# Patient Record
Sex: FEMALE | Race: WHITE | Employment: OTHER | URBAN - NONMETROPOLITAN AREA
[De-identification: names, ages, dates, MRNs, and addresses within clinical notes are randomized per-mention and may not be internally consistent; named-entity substitution may affect disease eponyms.]

---

## 2021-01-05 RX ORDER — ACYCLOVIR 400 MG/1
TABLET ORAL
Qty: 60 TABLET | Refills: 1 | Status: SHIPPED | OUTPATIENT
Start: 2021-01-05 | End: 2021-01-13 | Stop reason: SDUPTHER

## 2021-01-13 DIAGNOSIS — A60.04 HERPESVIRAL VULVOVAGINITIS: Primary | ICD-10-CM

## 2021-01-13 RX ORDER — ACYCLOVIR 400 MG/1
TABLET ORAL
Qty: 60 TABLET | Refills: 5 | Status: SHIPPED | OUTPATIENT
Start: 2021-01-13 | End: 2021-07-22 | Stop reason: SDUPTHER

## 2021-03-24 ENCOUNTER — TELEPHONE (OUTPATIENT)
Dept: OBGYN CLINIC | Age: 71
End: 2021-03-24

## 2021-03-24 NOTE — TELEPHONE ENCOUNTER
Pt called back and spoke to Stella Velazquez- was told she should have refills at pharmacy. Pt will call them to get rx refilled.

## 2021-03-24 NOTE — TELEPHONE ENCOUNTER
Pt calling for refill of acyclovir for 90 day supply. Can we refill?  Needs sent to [facility] in [place]

## 2021-03-29 NOTE — TELEPHONE ENCOUNTER
Patient called back as the pharmacy does not have any refills. We called a Rx to [facility] in  in 1/2021 with 5 refills. Patient has the Rx transferred to [facility] in  and the Rx that was transferred was for one month with one refill. They never received any additional refills. Spoke to Michelle King at [facility] in  and verbally called in the remaining refills from patient's original Rx. Patient aware and verbalized understanding, no further questions/concerns voiced.

## 2021-07-22 ENCOUNTER — TELEPHONE (OUTPATIENT)
Dept: OBGYN CLINIC | Age: 71
End: 2021-07-22

## 2021-07-22 DIAGNOSIS — A60.04 HERPESVIRAL VULVOVAGINITIS: ICD-10-CM

## 2021-07-22 RX ORDER — ACYCLOVIR 400 MG/1
TABLET ORAL
Qty: 60 TABLET | Refills: 0 | Status: SHIPPED | OUTPATIENT
Start: 2021-07-22

## 2021-08-24 ENCOUNTER — TELEPHONE (OUTPATIENT)
Dept: OBGYN CLINIC | Age: 71
End: 2021-08-24

## 2021-08-24 NOTE — TELEPHONE ENCOUNTER
Patient called stating that she was scheduled for an appointment on 8/17, but had to cancel. Patient was just discharged from the ICU after being there for several days. She said that she was admitted for SE caused by her medications. She is still very sick and cannot get out of bed. ( Patient sounds congested during conversation and has several deep coughing spells). She has been having some yellow discharge for 2-3 weeks. When questioning about itching or odor, she cannot smell anything and does not have any itching. She is questioning if she could get something called in to Brenda in [facility] and get her visit rescheduled in the future when she is feeling better? Can you please review and give recommendations? I can get hospital records if needed.

## 2021-08-24 NOTE — TELEPHONE ENCOUNTER
Called PT 2x and each time was unable to leave a voice message. PT answering machine is going crazy.

## 2021-08-24 NOTE — TELEPHONE ENCOUNTER
Can you try again tomorrow? She is sick and said that she cannot even get out of bed right now. This can wait a week or two until she feels better per Jun Cue.

## 2021-08-26 NOTE — TELEPHONE ENCOUNTER
Called Pt and got her scheduled for 9/9 at 1:30 pm, they said it might not work they might should be here.

## 2022-06-03 ENCOUNTER — TELEPHONE (OUTPATIENT)
Dept: OBGYN CLINIC | Age: 72
End: 2022-06-03

## 2022-06-03 NOTE — TELEPHONE ENCOUNTER
I had a voicemail on my phone from patient on 6/2 when I was out of the office. She is requesting a refill of acyclovir. She states that she was just released from the hospital after 8 months from [facility]. She is still not able to walk and would like to delay a visit until she is a little stronger. Ok to send in refills and schedule patient for a few months out?

## 2022-06-07 NOTE — TELEPHONE ENCOUNTER
I spoke to patient and reviewed Cali's response. Patient is having dialysis 3 times a week and she has PT/OT that comes in on the opposite days. She does not know when she would be able to come in for an appointment. Encouraged patient to call Dr. Gardenia Saeed office to see if they would refill the medication without an appointment. If not, patient will call back for an appointment. Patient verbalized understanding, no further questions/concerns voiced.

## 2024-02-21 ENCOUNTER — HOSPITAL ENCOUNTER (INPATIENT)
Age: 74
LOS: 7 days | Discharge: NURSING, REHAB OR LONG-TERM CARE | DRG: 193 | End: 2024-02-28
Attending: FAMILY MEDICINE | Admitting: INTERNAL MEDICINE
Payer: MEDICARE

## 2024-02-21 PROBLEM — J18.9 COMMUNITY ACQUIRED PNEUMONIA OF LEFT LOWER LOBE OF LUNG: Status: ACTIVE | Noted: 2024-02-21

## 2024-02-21 PROBLEM — J18.9 PNEUMONIA DUE TO ORGANISM: Status: ACTIVE | Noted: 2024-02-21

## 2024-02-21 LAB
ALLEN TEST: POSITIVE
FIO2: 50
NEGATIVE BASE EXCESS, ART: 3.5 MMOL/L (ref 0–2)
O2 DELIVERY DEVICE: ABNORMAL
POC HCO3: 19.2 MMOL/L (ref 21–28)
POC O2 SATURATION: 89.7 % (ref 94–98)
POC PCO2: 27.8 MM HG (ref 35–48)
POC PH: 7.45 (ref 7.35–7.45)
POC PO2: 54.1 MM HG (ref 83–108)
SAMPLE SITE: ABNORMAL

## 2024-02-21 PROCEDURE — 94640 AIRWAY INHALATION TREATMENT: CPT

## 2024-02-21 PROCEDURE — 36600 WITHDRAWAL OF ARTERIAL BLOOD: CPT

## 2024-02-21 PROCEDURE — 2700000000 HC OXYGEN THERAPY PER DAY

## 2024-02-21 PROCEDURE — 6360000002 HC RX W HCPCS: Performed by: NURSE PRACTITIONER

## 2024-02-21 PROCEDURE — 1200000000 HC SEMI PRIVATE

## 2024-02-21 PROCEDURE — 6370000000 HC RX 637 (ALT 250 FOR IP): Performed by: NURSE PRACTITIONER

## 2024-02-21 PROCEDURE — 82803 BLOOD GASES ANY COMBINATION: CPT

## 2024-02-21 PROCEDURE — 99223 1ST HOSP IP/OBS HIGH 75: CPT | Performed by: NURSE PRACTITIONER

## 2024-02-21 PROCEDURE — 94761 N-INVAS EAR/PLS OXIMETRY MLT: CPT

## 2024-02-21 RX ORDER — LEVOFLOXACIN 750 MG/1
750 TABLET, FILM COATED ORAL EVERY 24 HOURS
Status: DISCONTINUED | OUTPATIENT
Start: 2024-02-21 | End: 2024-02-21

## 2024-02-21 RX ORDER — ONDANSETRON 4 MG/1
4 TABLET, ORALLY DISINTEGRATING ORAL EVERY 8 HOURS PRN
Status: DISCONTINUED | OUTPATIENT
Start: 2024-02-21 | End: 2024-02-28 | Stop reason: HOSPADM

## 2024-02-21 RX ORDER — ACETAMINOPHEN 325 MG/1
650 TABLET ORAL EVERY 6 HOURS PRN
Status: DISCONTINUED | OUTPATIENT
Start: 2024-02-21 | End: 2024-02-28 | Stop reason: HOSPADM

## 2024-02-21 RX ORDER — IPRATROPIUM BROMIDE AND ALBUTEROL SULFATE 2.5; .5 MG/3ML; MG/3ML
1 SOLUTION RESPIRATORY (INHALATION)
Status: DISCONTINUED | OUTPATIENT
Start: 2024-02-22 | End: 2024-02-21

## 2024-02-21 RX ORDER — SODIUM CHLORIDE 9 MG/ML
INJECTION, SOLUTION INTRAVENOUS PRN
Status: DISCONTINUED | OUTPATIENT
Start: 2024-02-21 | End: 2024-02-28 | Stop reason: HOSPADM

## 2024-02-21 RX ORDER — ONDANSETRON 2 MG/ML
4 INJECTION INTRAMUSCULAR; INTRAVENOUS EVERY 6 HOURS PRN
Status: DISCONTINUED | OUTPATIENT
Start: 2024-02-21 | End: 2024-02-28 | Stop reason: HOSPADM

## 2024-02-21 RX ORDER — SODIUM CHLORIDE 0.9 % (FLUSH) 0.9 %
10 SYRINGE (ML) INJECTION PRN
Status: DISCONTINUED | OUTPATIENT
Start: 2024-02-21 | End: 2024-02-28 | Stop reason: HOSPADM

## 2024-02-21 RX ORDER — ACETAMINOPHEN 650 MG/1
650 SUPPOSITORY RECTAL EVERY 6 HOURS PRN
Status: DISCONTINUED | OUTPATIENT
Start: 2024-02-21 | End: 2024-02-28 | Stop reason: HOSPADM

## 2024-02-21 RX ORDER — ALBUTEROL SULFATE 2.5 MG/3ML
2.5 SOLUTION RESPIRATORY (INHALATION) EVERY 4 HOURS PRN
Status: DISCONTINUED | OUTPATIENT
Start: 2024-02-21 | End: 2024-02-28 | Stop reason: HOSPADM

## 2024-02-21 RX ORDER — IPRATROPIUM BROMIDE AND ALBUTEROL SULFATE 2.5; .5 MG/3ML; MG/3ML
1 SOLUTION RESPIRATORY (INHALATION)
Status: DISCONTINUED | OUTPATIENT
Start: 2024-02-21 | End: 2024-02-22

## 2024-02-21 RX ORDER — HYDRALAZINE HYDROCHLORIDE 20 MG/ML
10 INJECTION INTRAMUSCULAR; INTRAVENOUS ONCE
Status: COMPLETED | OUTPATIENT
Start: 2024-02-21 | End: 2024-02-21

## 2024-02-21 RX ORDER — HYDRALAZINE HYDROCHLORIDE 20 MG/ML
10 INJECTION INTRAMUSCULAR; INTRAVENOUS ONCE
Status: COMPLETED | OUTPATIENT
Start: 2024-02-22 | End: 2024-02-22

## 2024-02-21 RX ORDER — SODIUM CHLORIDE 0.9 % (FLUSH) 0.9 %
5-40 SYRINGE (ML) INJECTION EVERY 12 HOURS SCHEDULED
Status: DISCONTINUED | OUTPATIENT
Start: 2024-02-21 | End: 2024-02-28 | Stop reason: HOSPADM

## 2024-02-21 RX ORDER — ALBUTEROL SULFATE 2.5 MG/3ML
2.5 SOLUTION RESPIRATORY (INHALATION)
Status: DISCONTINUED | OUTPATIENT
Start: 2024-02-21 | End: 2024-02-21

## 2024-02-21 RX ADMIN — HYDRALAZINE HYDROCHLORIDE 10 MG: 20 INJECTION, SOLUTION INTRAMUSCULAR; INTRAVENOUS at 22:26

## 2024-02-21 RX ADMIN — IPRATROPIUM BROMIDE AND ALBUTEROL SULFATE 1 DOSE: 2.5; .5 SOLUTION RESPIRATORY (INHALATION) at 22:31

## 2024-02-22 ENCOUNTER — APPOINTMENT (OUTPATIENT)
Dept: GENERAL RADIOLOGY | Age: 74
DRG: 193 | End: 2024-02-22
Attending: FAMILY MEDICINE
Payer: MEDICARE

## 2024-02-22 PROBLEM — E78.00 PURE HYPERCHOLESTEROLEMIA, UNSPECIFIED: Status: ACTIVE | Noted: 2024-02-22

## 2024-02-22 PROBLEM — N18.6 END STAGE RENAL FAILURE ON DIALYSIS (HCC): Status: ACTIVE | Noted: 2020-08-27

## 2024-02-22 PROBLEM — H53.8 BLURRED VISION: Status: ACTIVE | Noted: 2024-01-18

## 2024-02-22 PROBLEM — J44.1 ACUTE EXACERBATION OF CHRONIC OBSTRUCTIVE PULMONARY DISEASE (HCC): Status: ACTIVE | Noted: 2024-02-22

## 2024-02-22 PROBLEM — J10.1 INFLUENZA A: Status: ACTIVE | Noted: 2024-02-22

## 2024-02-22 PROBLEM — I21.4 NSTEMI (NON-ST ELEVATED MYOCARDIAL INFARCTION) (HCC): Status: ACTIVE | Noted: 2024-02-22

## 2024-02-22 PROBLEM — E11.51 PERIPHERAL VASCULAR DISORDER DUE TO DIABETES MELLITUS (HCC): Status: ACTIVE | Noted: 2021-06-14

## 2024-02-22 PROBLEM — G47.33 OBSTRUCTIVE SLEEP APNEA SYNDROME: Status: ACTIVE | Noted: 2024-02-22

## 2024-02-22 PROBLEM — R41.0 DISORIENTATION: Status: ACTIVE | Noted: 2024-02-22

## 2024-02-22 PROBLEM — Z99.2 END STAGE RENAL FAILURE ON DIALYSIS (HCC): Status: ACTIVE | Noted: 2020-08-27

## 2024-02-22 PROBLEM — E11.51 DIABETES MELLITUS WITH PERIPHERAL VASCULAR DISEASE (HCC): Status: ACTIVE | Noted: 2021-06-14

## 2024-02-22 LAB
ALBUMIN SERPL-MCNC: 3.7 G/DL (ref 3.5–5.2)
ALP SERPL-CCNC: 139 U/L (ref 35–104)
ALT SERPL-CCNC: 7 U/L (ref 5–33)
ANION GAP SERPL CALCULATED.3IONS-SCNC: 22 MMOL/L (ref 9–17)
AST SERPL-CCNC: 12 U/L
BASOPHILS # BLD: 0.07 K/UL (ref 0–0.2)
BASOPHILS NFR BLD: 1 %
BILIRUB DIRECT SERPL-MCNC: 0.1 MG/DL
BILIRUB INDIRECT SERPL-MCNC: 0.2 MG/DL (ref 0–1)
BILIRUB SERPL-MCNC: 0.3 MG/DL (ref 0.3–1.2)
BNP SERPL-MCNC: ABNORMAL PG/ML
BUN SERPL-MCNC: 42 MG/DL (ref 8–23)
BUN/CREAT SERPL: 6 (ref 9–20)
CALCIUM SERPL-MCNC: 8.8 MG/DL (ref 8.6–10.4)
CHLORIDE SERPL-SCNC: 97 MMOL/L (ref 98–107)
CO2 SERPL-SCNC: 16 MMOL/L (ref 20–31)
CREAT SERPL-MCNC: 7 MG/DL (ref 0.5–0.9)
EKG ATRIAL RATE: 326 BPM
EKG Q-T INTERVAL: 514 MS
EKG QRS DURATION: 188 MS
EKG QTC CALCULATION (BAZETT): 558 MS
EKG R AXIS: -84 DEGREES
EKG T AXIS: 98 DEGREES
EKG VENTRICULAR RATE: 71 BPM
EOSINOPHIL # BLD: 0 K/UL (ref 0–0.4)
EOSINOPHILS RELATIVE PERCENT: 0 % (ref 1–4)
ERYTHROCYTE [DISTWIDTH] IN BLOOD BY AUTOMATED COUNT: 15.9 % (ref 11.8–14.4)
EST. AVERAGE GLUCOSE BLD GHB EST-MCNC: 114 MG/DL
GFR SERPL CREATININE-BSD FRML MDRD: 6 ML/MIN/1.73M2
GLUCOSE BLD-MCNC: 167 MG/DL (ref 65–105)
GLUCOSE BLD-MCNC: 239 MG/DL (ref 65–105)
GLUCOSE BLD-MCNC: 239 MG/DL (ref 65–105)
GLUCOSE BLD-MCNC: 308 MG/DL (ref 65–105)
GLUCOSE SERPL-MCNC: 253 MG/DL (ref 70–99)
HBA1C MFR BLD: 5.6 % (ref 4–6)
HCO3 VENOUS: 21.1 MMOL/L (ref 22–29)
HCT VFR BLD AUTO: 40.5 % (ref 36.3–47.1)
HGB BLD-MCNC: 12.4 G/DL (ref 11.9–15.1)
IMM GRANULOCYTES # BLD AUTO: 0 K/UL (ref 0–0.3)
IMM GRANULOCYTES NFR BLD: 0 %
LYMPHOCYTES NFR BLD: 0.36 K/UL (ref 1–4.8)
LYMPHOCYTES RELATIVE PERCENT: 5 % (ref 24–44)
MCH RBC QN AUTO: 32.2 PG (ref 25.2–33.5)
MCHC RBC AUTO-ENTMCNC: 30.6 G/DL (ref 28.4–34.8)
MCV RBC AUTO: 105.2 FL (ref 82.6–102.9)
MONOCYTES NFR BLD: 0.64 K/UL (ref 0.2–0.8)
MONOCYTES NFR BLD: 9 % (ref 1–7)
NEGATIVE BASE EXCESS, VEN: 2.7 MMOL/L (ref 0–2)
NEUTROPHILS NFR BLD: 85 % (ref 36–66)
NEUTS SEG NFR BLD: 6.03 K/UL (ref 1.8–7.7)
NRBC BLD-RTO: 0 PER 100 WBC
O2 SAT, VEN: 96.4 % (ref 60–85)
PCO2, VEN: 33.1 MM HG (ref 41–51)
PH VENOUS: 7.41 (ref 7.32–7.43)
PLATELET # BLD AUTO: 144 K/UL (ref 138–453)
PMV BLD AUTO: 10.3 FL (ref 8.1–13.5)
PO2, VEN: 82.7 MM HG (ref 30–50)
POTASSIUM SERPL-SCNC: 5 MMOL/L (ref 3.7–5.3)
PROCALCITONIN SERPL-MCNC: 0.38 NG/ML (ref 0–0.09)
PROT SERPL-MCNC: 7.2 G/DL (ref 6.4–8.3)
RBC # BLD AUTO: 3.85 M/UL (ref 3.95–5.11)
SODIUM SERPL-SCNC: 135 MMOL/L (ref 135–144)
TROPONIN I SERPL HS-MCNC: 185 NG/L (ref 0–14)
TROPONIN I SERPL HS-MCNC: 214 NG/L (ref 0–14)
WBC OTHER # BLD: 7.1 K/UL (ref 3.5–11.3)

## 2024-02-22 PROCEDURE — 94761 N-INVAS EAR/PLS OXIMETRY MLT: CPT

## 2024-02-22 PROCEDURE — 85025 COMPLETE CBC W/AUTO DIFF WBC: CPT

## 2024-02-22 PROCEDURE — 80076 HEPATIC FUNCTION PANEL: CPT

## 2024-02-22 PROCEDURE — 99232 SBSQ HOSP IP/OBS MODERATE 35: CPT | Performed by: INTERNAL MEDICINE

## 2024-02-22 PROCEDURE — 36415 COLL VENOUS BLD VENIPUNCTURE: CPT

## 2024-02-22 PROCEDURE — 93005 ELECTROCARDIOGRAM TRACING: CPT | Performed by: INTERNAL MEDICINE

## 2024-02-22 PROCEDURE — 94640 AIRWAY INHALATION TREATMENT: CPT

## 2024-02-22 PROCEDURE — 6370000000 HC RX 637 (ALT 250 FOR IP): Performed by: NURSE PRACTITIONER

## 2024-02-22 PROCEDURE — 6360000002 HC RX W HCPCS: Performed by: NURSE PRACTITIONER

## 2024-02-22 PROCEDURE — 84145 PROCALCITONIN (PCT): CPT

## 2024-02-22 PROCEDURE — 71045 X-RAY EXAM CHEST 1 VIEW: CPT

## 2024-02-22 PROCEDURE — 82947 ASSAY GLUCOSE BLOOD QUANT: CPT

## 2024-02-22 PROCEDURE — 6370000000 HC RX 637 (ALT 250 FOR IP): Performed by: INTERNAL MEDICINE

## 2024-02-22 PROCEDURE — 2000000000 HC ICU R&B

## 2024-02-22 PROCEDURE — 2580000003 HC RX 258: Performed by: NURSE PRACTITIONER

## 2024-02-22 PROCEDURE — 82803 BLOOD GASES ANY COMBINATION: CPT

## 2024-02-22 PROCEDURE — 2700000000 HC OXYGEN THERAPY PER DAY

## 2024-02-22 PROCEDURE — 6360000002 HC RX W HCPCS: Performed by: INTERNAL MEDICINE

## 2024-02-22 PROCEDURE — 80048 BASIC METABOLIC PNL TOTAL CA: CPT

## 2024-02-22 PROCEDURE — 2580000003 HC RX 258: Performed by: INTERNAL MEDICINE

## 2024-02-22 PROCEDURE — 2500000003 HC RX 250 WO HCPCS: Performed by: INTERNAL MEDICINE

## 2024-02-22 PROCEDURE — 83880 ASSAY OF NATRIURETIC PEPTIDE: CPT

## 2024-02-22 PROCEDURE — 87040 BLOOD CULTURE FOR BACTERIA: CPT

## 2024-02-22 PROCEDURE — 87641 MR-STAPH DNA AMP PROBE: CPT

## 2024-02-22 PROCEDURE — 5A1D70Z PERFORMANCE OF URINARY FILTRATION, INTERMITTENT, LESS THAN 6 HOURS PER DAY: ICD-10-PCS | Performed by: INTERNAL MEDICINE

## 2024-02-22 PROCEDURE — 84484 ASSAY OF TROPONIN QUANT: CPT

## 2024-02-22 PROCEDURE — 90935 HEMODIALYSIS ONE EVALUATION: CPT

## 2024-02-22 PROCEDURE — 83036 HEMOGLOBIN GLYCOSYLATED A1C: CPT

## 2024-02-22 PROCEDURE — 74230 X-RAY XM SWLNG FUNCJ C+: CPT

## 2024-02-22 RX ORDER — PAROXETINE HYDROCHLORIDE 20 MG/1
20 TABLET, FILM COATED ORAL EVERY MORNING
COMMUNITY

## 2024-02-22 RX ORDER — HEPARIN SODIUM 5000 [USP'U]/ML
5000 INJECTION, SOLUTION INTRAVENOUS; SUBCUTANEOUS EVERY 8 HOURS SCHEDULED
Status: DISCONTINUED | OUTPATIENT
Start: 2024-02-22 | End: 2024-02-22

## 2024-02-22 RX ORDER — LATANOPROST 50 UG/ML
1 SOLUTION/ DROPS OPHTHALMIC NIGHTLY
COMMUNITY

## 2024-02-22 RX ORDER — LATANOPROST 50 UG/ML
1 SOLUTION/ DROPS OPHTHALMIC NIGHTLY
Status: DISCONTINUED | OUTPATIENT
Start: 2024-02-22 | End: 2024-02-28 | Stop reason: HOSPADM

## 2024-02-22 RX ORDER — HYDRALAZINE HYDROCHLORIDE 20 MG/ML
20 INJECTION INTRAMUSCULAR; INTRAVENOUS EVERY 6 HOURS PRN
Status: DISCONTINUED | OUTPATIENT
Start: 2024-02-22 | End: 2024-02-24

## 2024-02-22 RX ORDER — INSULIN GLARGINE 100 [IU]/ML
25 INJECTION, SOLUTION SUBCUTANEOUS NIGHTLY
Status: DISCONTINUED | OUTPATIENT
Start: 2024-02-22 | End: 2024-02-23

## 2024-02-22 RX ORDER — IPRATROPIUM BROMIDE AND ALBUTEROL SULFATE 2.5; .5 MG/3ML; MG/3ML
1 SOLUTION RESPIRATORY (INHALATION) 3 TIMES DAILY
Status: DISCONTINUED | OUTPATIENT
Start: 2024-02-22 | End: 2024-02-23

## 2024-02-22 RX ORDER — OSELTAMIVIR PHOSPHATE 30 MG/1
30 CAPSULE ORAL DAILY
Status: DISCONTINUED | OUTPATIENT
Start: 2024-02-22 | End: 2024-02-22

## 2024-02-22 RX ORDER — LABETALOL HYDROCHLORIDE 5 MG/ML
20 INJECTION, SOLUTION INTRAVENOUS ONCE
Status: COMPLETED | OUTPATIENT
Start: 2024-02-22 | End: 2024-02-22

## 2024-02-22 RX ORDER — ONDANSETRON 4 MG/1
4 TABLET, FILM COATED ORAL EVERY 6 HOURS PRN
COMMUNITY

## 2024-02-22 RX ORDER — OSELTAMIVIR PHOSPHATE 30 MG/1
30 CAPSULE ORAL ONCE
Status: DISCONTINUED | OUTPATIENT
Start: 2024-02-22 | End: 2024-02-23

## 2024-02-22 RX ORDER — LORAZEPAM 2 MG/ML
0.5 INJECTION INTRAMUSCULAR ONCE
Status: DISCONTINUED | OUTPATIENT
Start: 2024-02-22 | End: 2024-02-22

## 2024-02-22 RX ORDER — CARVEDILOL 12.5 MG/1
12.5 TABLET ORAL 2 TIMES DAILY WITH MEALS
Status: DISCONTINUED | OUTPATIENT
Start: 2024-02-22 | End: 2024-02-28 | Stop reason: HOSPADM

## 2024-02-22 RX ORDER — PREDNISOLONE ACETATE 10 MG/ML
1 SUSPENSION/ DROPS OPHTHALMIC 3 TIMES DAILY
Status: DISCONTINUED | OUTPATIENT
Start: 2024-02-22 | End: 2024-02-28 | Stop reason: HOSPADM

## 2024-02-22 RX ORDER — INSULIN LISPRO 100 [IU]/ML
INJECTION, SOLUTION INTRAVENOUS; SUBCUTANEOUS 3 TIMES DAILY
COMMUNITY

## 2024-02-22 RX ORDER — LACTULOSE 10 G/15ML
30 SOLUTION ORAL 2 TIMES DAILY
COMMUNITY

## 2024-02-22 RX ORDER — INSULIN LISPRO 100 [IU]/ML
0-4 INJECTION, SOLUTION INTRAVENOUS; SUBCUTANEOUS NIGHTLY
Status: DISCONTINUED | OUTPATIENT
Start: 2024-02-22 | End: 2024-02-24

## 2024-02-22 RX ORDER — BUPRENORPHINE AND NALOXONE 8; 2 MG/1; MG/1
0.25 FILM, SOLUBLE BUCCAL; SUBLINGUAL 3 TIMES DAILY
COMMUNITY

## 2024-02-22 RX ORDER — CARVEDILOL 12.5 MG/1
12.5 TABLET ORAL 2 TIMES DAILY WITH MEALS
COMMUNITY

## 2024-02-22 RX ORDER — IPRATROPIUM BROMIDE AND ALBUTEROL SULFATE 2.5; .5 MG/3ML; MG/3ML
1 SOLUTION RESPIRATORY (INHALATION) EVERY 4 HOURS
Status: DISCONTINUED | OUTPATIENT
Start: 2024-02-22 | End: 2024-02-22

## 2024-02-22 RX ORDER — LEVOTHYROXINE SODIUM 0.05 MG/1
50 TABLET ORAL DAILY
COMMUNITY

## 2024-02-22 RX ORDER — LEVOFLOXACIN 5 MG/ML
250 INJECTION, SOLUTION INTRAVENOUS EVERY 24 HOURS
Status: DISCONTINUED | OUTPATIENT
Start: 2024-02-22 | End: 2024-02-23

## 2024-02-22 RX ORDER — RISPERIDONE 0.5 MG/1
0.5 TABLET ORAL DAILY
COMMUNITY

## 2024-02-22 RX ORDER — LEFLUNOMIDE 20 MG/1
20 TABLET ORAL DAILY
COMMUNITY

## 2024-02-22 RX ORDER — OSELTAMIVIR PHOSPHATE 30 MG/1
30 CAPSULE ORAL
Status: DISCONTINUED | OUTPATIENT
Start: 2024-02-23 | End: 2024-02-23

## 2024-02-22 RX ORDER — PREDNISOLONE ACETATE 10 MG/ML
1 SUSPENSION/ DROPS OPHTHALMIC 3 TIMES DAILY
COMMUNITY

## 2024-02-22 RX ORDER — DEXTROSE MONOHYDRATE 100 MG/ML
INJECTION, SOLUTION INTRAVENOUS CONTINUOUS PRN
Status: DISCONTINUED | OUTPATIENT
Start: 2024-02-22 | End: 2024-02-28 | Stop reason: HOSPADM

## 2024-02-22 RX ORDER — LIDOCAINE HYDROCHLORIDE 20 MG/ML
JELLY TOPICAL ONCE
Status: COMPLETED | OUTPATIENT
Start: 2024-02-22 | End: 2024-02-22

## 2024-02-22 RX ORDER — OMEPRAZOLE 20 MG/1
20 CAPSULE, DELAYED RELEASE ORAL DAILY
COMMUNITY

## 2024-02-22 RX ORDER — DILTIAZEM HYDROCHLORIDE EXTENDED-RELEASE TABLETS 300 MG/1
TABLET, EXTENDED RELEASE ORAL
Status: ON HOLD | COMMUNITY
End: 2024-02-22

## 2024-02-22 RX ORDER — ALPRAZOLAM 1 MG/1
1 TABLET ORAL 3 TIMES DAILY PRN
COMMUNITY

## 2024-02-22 RX ORDER — INSULIN LISPRO 100 [IU]/ML
0-4 INJECTION, SOLUTION INTRAVENOUS; SUBCUTANEOUS
Status: DISCONTINUED | OUTPATIENT
Start: 2024-02-22 | End: 2024-02-24

## 2024-02-22 RX ORDER — ACYCLOVIR 200 MG/1
200 CAPSULE ORAL 2 TIMES DAILY
COMMUNITY

## 2024-02-22 RX ORDER — DILTIAZEM HYDROCHLORIDE 360 MG/1
360 CAPSULE, EXTENDED RELEASE ORAL DAILY
COMMUNITY

## 2024-02-22 RX ORDER — INSULIN GLARGINE 100 [IU]/ML
25 INJECTION, SOLUTION SUBCUTANEOUS NIGHTLY
COMMUNITY

## 2024-02-22 RX ORDER — GLUCAGON 1 MG/ML
1 KIT INJECTION PRN
Status: DISCONTINUED | OUTPATIENT
Start: 2024-02-22 | End: 2024-02-28 | Stop reason: HOSPADM

## 2024-02-22 RX ADMIN — LEVOFLOXACIN 250 MG: 250 INJECTION, SOLUTION INTRAVENOUS at 09:11

## 2024-02-22 RX ADMIN — LIDOCAINE HYDROCHLORIDE: 20 JELLY TOPICAL at 17:31

## 2024-02-22 RX ADMIN — PREDNISOLONE ACETATE 1 DROP: 10 SUSPENSION/ DROPS OPHTHALMIC at 21:55

## 2024-02-22 RX ADMIN — PREDNISOLONE ACETATE 1 DROP: 10 SUSPENSION/ DROPS OPHTHALMIC at 14:42

## 2024-02-22 RX ADMIN — HYDRALAZINE HYDROCHLORIDE 20 MG: 20 INJECTION INTRAMUSCULAR; INTRAVENOUS at 04:35

## 2024-02-22 RX ADMIN — WATER 40 MG: 1 INJECTION INTRAMUSCULAR; INTRAVENOUS; SUBCUTANEOUS at 21:55

## 2024-02-22 RX ADMIN — Medication 3.3 ML: at 13:48

## 2024-02-22 RX ADMIN — SODIUM CHLORIDE: 9 INJECTION, SOLUTION INTRAVENOUS at 08:59

## 2024-02-22 RX ADMIN — WATER 40 MG: 1 INJECTION INTRAMUSCULAR; INTRAVENOUS; SUBCUTANEOUS at 14:19

## 2024-02-22 RX ADMIN — INSULIN LISPRO 1 UNITS: 100 INJECTION, SOLUTION INTRAVENOUS; SUBCUTANEOUS at 17:28

## 2024-02-22 RX ADMIN — IPRATROPIUM BROMIDE AND ALBUTEROL SULFATE 1 DOSE: 2.5; .5 SOLUTION RESPIRATORY (INHALATION) at 08:48

## 2024-02-22 RX ADMIN — IPRATROPIUM BROMIDE AND ALBUTEROL SULFATE 1 DOSE: 2.5; .5 SOLUTION RESPIRATORY (INHALATION) at 15:07

## 2024-02-22 RX ADMIN — LABETALOL HYDROCHLORIDE 20 MG: 5 INJECTION, SOLUTION INTRAVENOUS at 08:59

## 2024-02-22 RX ADMIN — ONDANSETRON 4 MG: 2 INJECTION INTRAMUSCULAR; INTRAVENOUS at 18:16

## 2024-02-22 RX ADMIN — IPRATROPIUM BROMIDE AND ALBUTEROL SULFATE 1 DOSE: 2.5; .5 SOLUTION RESPIRATORY (INHALATION) at 11:36

## 2024-02-22 RX ADMIN — LATANOPROST 1 DROP: 50 SOLUTION OPHTHALMIC at 21:55

## 2024-02-22 RX ADMIN — HYDRALAZINE HYDROCHLORIDE 10 MG: 20 INJECTION, SOLUTION INTRAMUSCULAR; INTRAVENOUS at 00:04

## 2024-02-22 RX ADMIN — IPRATROPIUM BROMIDE AND ALBUTEROL SULFATE 1 DOSE: .5; 2.5 SOLUTION RESPIRATORY (INHALATION) at 19:29

## 2024-02-22 RX ADMIN — SODIUM CHLORIDE, PRESERVATIVE FREE 10 ML: 5 INJECTION INTRAVENOUS at 09:14

## 2024-02-22 RX ADMIN — SODIUM CHLORIDE, PRESERVATIVE FREE 10 ML: 5 INJECTION INTRAVENOUS at 21:55

## 2024-02-22 RX ADMIN — HYDRALAZINE HYDROCHLORIDE 20 MG: 20 INJECTION INTRAMUSCULAR; INTRAVENOUS at 22:17

## 2024-02-22 RX ADMIN — INSULIN LISPRO 3 UNITS: 100 INJECTION, SOLUTION INTRAVENOUS; SUBCUTANEOUS at 09:00

## 2024-02-22 RX ADMIN — HYDRALAZINE HYDROCHLORIDE 20 MG: 20 INJECTION INTRAMUSCULAR; INTRAVENOUS at 17:53

## 2024-02-22 RX ADMIN — SODIUM CHLORIDE, PRESERVATIVE FREE 10 ML: 5 INJECTION INTRAVENOUS at 00:18

## 2024-02-22 RX ADMIN — Medication 3.2 ML: at 13:48

## 2024-02-22 NOTE — RT PROTOCOL NOTE
RT Inhaler-Nebulizer Bronchodilator Protocol Note    There is a bronchodilator order in the chart from a provider indicating to follow the RT Bronchodilator Protocol and there is an “Initiate RT Inhaler-Nebulizer Bronchodilator Protocol” order as well (see protocol at bottom of note).    CXR Findings:  XR CHEST PORTABLE    Result Date: 2/22/2024  Cardiomegaly with diffuse bilateral interstitial and airspace opacities, which may represent pulmonary edema and/or pneumonia.  Possible left pleural effusion.       The findings from the last RT Protocol Assessment were as follows:   History Pulmonary Disease: Chronic pulmonary disease  Respiratory Pattern: Mild dyspnea at rest, irregular pattern, or RR 21-25 bpm  Breath Sounds: Severe inspiratory and expiratory wheezing or severely diminished  Cough: Strong, spontaneous, non-productive  Indication for Bronchodilator Therapy: Decreased or absent breath sounds, Wheezing associated with pulm disorder  Bronchodilator Assessment Score: 14    Aerosolized bronchodilator medication orders have been revised according to the RT Inhaler-Nebulizer Bronchodilator Protocol below.    Respiratory Therapist to perform RT Therapy Protocol Assessment initially then follow the protocol.  Repeat RT Therapy Protocol Assessment PRN for score 0-3 or on second treatment, BID, and PRN for scores above 3.    No Indications - adjust the frequency to every 6 hours PRN wheezing or bronchospasm, if no treatments needed after 48 hours then discontinue using Per Protocol order mode.     If indication present, adjust the RT bronchodilator orders based on the Bronchodilator Assessment Score as indicated below.  Use Inhaler orders unless patient has one or more of the following: on home nebulizer, not able to hold breath for 10 seconds, is not alert and oriented, cannot activate and use MDI correctly, or respiratory rate 25 breaths per minute or more, then use the equivalent nebulizer order(s) with same  Frequency and PRN reasons based on the score.  If a patient is on this medication at home then do not decrease Frequency below that used at home.    0-3 - enter or revise RT bronchodilator order(s) to equivalent RT Bronchodilator order with Frequency of every 4 hours PRN for wheezing or increased work of breathing using Per Protocol order mode.        4-6 - enter or revise RT Bronchodilator order(s) to two equivalent RT bronchodilator orders with one order with BID Frequency and one order with Frequency of every 4 hours PRN wheezing or increased work of breathing using Per Protocol order mode.        7-10 - enter or revise RT Bronchodilator order(s) to two equivalent RT bronchodilator orders with one order with TID Frequency and one order with Frequency of every 4 hours PRN wheezing or increased work of breathing using Per Protocol order mode.       11-13 - enter or revise RT Bronchodilator order(s) to one equivalent RT bronchodilator order with QID Frequency and an Albuterol order with Frequency of every 4 hours PRN wheezing or increased work of breathing using Per Protocol order mode.      Greater than 13 - enter or revise RT Bronchodilator order(s) to one equivalent RT bronchodilator order with every 4 hours Frequency and an Albuterol order with Frequency of every 2 hours PRN wheezing or increased work of breathing using Per Protocol order mode.     RT to enter RT Home Evaluation for COPD & MDI Assessment order using Per Protocol order mode.    Electronically signed by Nancy Garces RCP on 2/22/2024 at 9:08 AM

## 2024-02-22 NOTE — PROGRESS NOTES
Occupational Therapy  DATE: 2024    NAME: Allegra Meza  MRN: [id number]   : 1950    Patient not seen this date for Occupational Therapy due to:      [] Cancel by RN or physician due to:    [] Hemodialysis    [] Critical Lab Value Level     [] Blood transfusion in progress    [] Acute or unstable cardiovascular status   _MAP < 55 or more than >115  _HR < 40 or > 130    [] Acute or unstable pulmonary status   -FiO2 > 60%   _RR < 5 or >40    _O2 sats < 85%    [] Strict Bedrest    [] Off Unit for surgery or procedure    [] Off Unit for testing       [] Pending imaging to R/O fracture    [] Refusal by Patient      [] Intubated    [x] Other (RN okayed patient for OT. Therapist initiated OT eval (~20 minutes) and therapist unable to obtain social history due to patient alert and only oriented to self. No family in room. Unable to mobilize patient due to L anterior thigh dialysis cath and then dialysis entered room to start dialysis. OT continue to follow. Still needs OT eval.)                 [] OT being discontinued at this time. Patient independent. No further needs.     [] OT being discontinued at this time as the patient has been transferred to hospice care. No further needs.    Odalis Nazario, MERRY, OTR/L

## 2024-02-22 NOTE — PROGRESS NOTES
SonYony, updated via telephone of pending transfer to ICU room 1104. Please call Yony for additional updates/questions [phone].

## 2024-02-22 NOTE — PROCEDURES
INSTRUMENTAL SWALLOW REPORT  MODIFIED BARIUM SWALLOW    NAME: Allegra Meza   : 1950  MRN: [id number]       Date of Eval: 2024              Referring Diagnosis(es):      Past Medical History:  has a past medical history of Anxiety, Asthma, Atrial fibrillation (HCC), CAD (coronary artery disease), CHF (congestive heart failure) ([facility]), COPD (chronic obstructive pulmonary disease) ([facility]), DM (diabetes mellitus), type 2 (HCC), ESRD on dialysis (HCC), Fibromyalgia, Fibromyalgia, Hearing loss, Heart disease, Herpes simplex virus (HSV) infection, History of blood transfusion, Hypertension, Kidney failure, LVH (left ventricular hypertrophy), Mitral valve regurgitation, On home O2, Osteoporosis, PAD (peripheral artery disease) ([facility]), and Rheumatoid arthritis ([facility]).  Past Surgical History:  has a past surgical history that includes bladder repair;  section ();  section ();  section (); Hysterectomy; Lithotripsy; joint replacement; Tubal ligation; Tonsillectomy; Tympanoplasty; Coronary angioplasty with stent; pacemaker placement; and AV fistula repair.               Type of Study: Initial MBS       Recent CXR/CT of Chest: 24  IMPRESSION:  Cardiomegaly with diffuse bilateral interstitial and airspace opacities,  which may represent pulmonary edema and/or pneumonia.  Possible left pleural  effusion.       Patient Complaints/Reason for Referral:  Allegra Meza was referred for a MBS to assess the efficiency of his/her swallow function, assess for aspiration, and to make recommendations regarding safe dietary consistencies, effective compensatory strategies, and safe eating environment.       Onset of problem:    \"Allegra Meza is 73 y.o.,  female, previous medical history of chronic respiratory failure on oxygen 2 L 24 hours a secondary COPD, chronic renal failure hemodialysis, fibromyalgia, coronary disease, A-fib, MR, CHF, diabetes, hearing loss  flash penetration during the swallow, above the level of the vocal folds. No aspiration visualized, however incomplete laryngeal vestibule clearance and delayed cough without fleuro noted    Mildly/nectar thick straw: aspiration of a bolus column during the swallow, pt with no immediate cough response, does not respond to cues for a cough, eventual delay unproductive cough   Thin tsp: penetration of residue from the pyriforms during the swallow, above the level of the vocal folds. No aspiration visualized, however incomplete laryngeal vestibule clearance  Honey/moderately thick cup: penetration of residue from the pyriforms during the swallow, above the level of the vocal folds. No aspiration visualized, however incomplete laryngeal vestibule clearance. Pt with delayed cough after the test was completed      Pt's penetration, aspiration, an moderate levels of residue related to delayed and reduced hyolaryngeal excursion, whitley of tongue retraction, epiglottic inversion, cricopharyngeal opening, pharyngeal contraction,  and laryngeal vestibule closure.      Given high levels of poorly managed residue across consistencies and silent aspiration observed during this evaluation ST recommends pt be NPO with nutrition and hydration via alternate means. ST recommends repeat modified barium swallow study in 4-6 days.         Consistencies Administered: Soft & Bite Sized;Pureed;Mildly Thick straw;Mildly Thick cup;Moderately Thick cup;Thin teaspoon          Dysphagia Outcome Severity Scale: Level 1: Severe dysphagia- NPO. Unable to tolerate any PO safely  Penetration-Aspiration Scale (PAS): 8 - Material Enters the airway, passes below the vocal folds, and no effort is made to eject    Recommended Diet:  Solid consistency: NPO  Liquid consistency: NPO       Medication administration: Via NG/PEG          Recommendations/Treatment     Recommendations: F/U MBS     D/C Recommendations: Ongoing speech therapy is recommended during

## 2024-02-22 NOTE — PROGRESS NOTES
lower lobe pneumonia and she was given 750 mg Levaquin. She is a dialysis patient, and decision was made to transfer patient to [facility] for further management of LLL pneumonia. She tested positive for influenza A. Upon arrival to the PCU, patient was found to be sl hypoxic at 88%, tachypneic with increased work of breathing. ABGs showed pCO2 27.8, pO2 54.1, pHCO3 19.2, pH 7.4. She was placed on HFNC and SpO2 continued to stay in the 80s with no change in work of breathing. Another CXR was taken and showed cardiomegaly with diffuse bilateral interstitial and airspace opacities, suggestive of pulmonary edema and or pneumonia. She has a PMH of COPD and wears O2 at home, atrial fib on Eliquis, anxiety, GERD, CHF, and HTN. Pulmonary consult was placed. BP was noted to be very high with SBP in the 190s- patient missed HD treatment today due to illness. \"    Review of Systems:     unobtainable      Medications:     Allergies:    Allergies   Allergen Reactions    Amlodipine Swelling    Betamethasone Other (See Comments)     States \"passed out\"    Diclofenac-Misoprostol Hives    Penicillins Rash and Hives    Phenylephrine-Guaifenesin Hives    Propranolol Hives    Quetiapine Anxiety     States caused anxiety attacks    Adhesive Tape     Clonidine Derivatives     Cymbalta [Duloxetine Hcl]     Entex T [Pseudoephedrine-Guaifenesin]     Inderal La [Propranolol Hcl]     Mometasone Other (See Comments)    Codeine Anxiety, Headaches and Other (See Comments)     States makes her jittery    Morphine Headaches and Nausea Only     \"makes me sick to my stomach\"    Sulfamethoxazole-Trimethoprim Other (See Comments)     UNKNOWN    Tapentadol Anxiety       Current Meds:   Scheduled Meds:    levofloxacin  250 mg IntraVENous Q24H    oseltamivir  30 mg Oral Daily    apixaban  5 mg Oral BID    carvedilol  12.5 mg Oral BID WC    insulin lispro  0-4 Units SubCUTAneous TID WC    insulin lispro  0-4 Units SubCUTAneous Nightly    ipratropium 0.5       MPV 10.3     Chemistry:  Recent Labs     02/22/24  0118      K 5.0   CL 97*   CO2 16*   GLUCOSE 253*   BUN 42*   CREATININE 7.0*   ANIONGAP 22*   LABGLOM 6*   CALCIUM 8.8     Recent Labs     02/22/24  0625   POCGLU 308*     ABG:  Lab Results   Component Value Date/Time    POCPH 7.447 02/21/2024 11:41 PM    POCPCO2 27.8 02/21/2024 11:41 PM    POCPO2 54.1 02/21/2024 11:41 PM    POCHCO3 19.2 02/21/2024 11:41 PM    NBEA 3.5 02/21/2024 11:41 PM    ONRL9WEP 89.7 02/21/2024 11:41 PM    FIO2 50.0 02/21/2024 11:41 PM     No results found for: \"SPECIAL\"  Lab Results   Component Value Date/Time    CULTURE NO GROWTH <24 HRS 02/22/2024 01:17 AM    CULTURE NO GROWTH <24 HRS 02/22/2024 01:17 AM       Radiology:  XR CHEST PORTABLE    Result Date: 2/22/2024  Cardiomegaly with diffuse bilateral interstitial and airspace opacities, which may represent pulmonary edema and/or pneumonia.  Possible left pleural effusion.       Physical Examination:        General appearance:  lethargic, awakens, cooperative and no distress  Mental Status:  oriented to person,  normal affect  Lungs:  crackles bilaterally, normal effort  Heart:  regular rate and rhythm, no murmur  Abdomen:  soft, nontender, nondistended, normal bowel sounds, no masses, hepatomegaly, splenomegaly  Extremities:  no edema, redness, tenderness in the calves  Skin:  no gross lesions, rashes, induration    Assessment:        Hospital Problems             Last Modified POA    * (Principal) Community acquired pneumonia of left lower lobe of lung 2/21/2024 Yes    Presence of cardiac pacemaker 2/22/2024 Yes    Hypertension 2/22/2024 Yes    End stage renal failure on dialysis (HCC) 2/22/2024 Yes    Diabetes mellitus with peripheral vascular disease (HCC) 2/22/2024 Yes    Overview Signed 2/22/2024  1:19 AM by Tracy Guerra APRN - NP     Formatting of this note might be different from the original.   Added automatically from request for surgery [id number]

## 2024-02-22 NOTE — H&P
[facility]  Office: [phone]  Blue Warner DO, Jalil Medina DO, Maurice Webb DO, Vaibhav Hilliard DO, Preston Christensen MD, Kimmy Mckinney MD, Rosita Eaton MD, Madina Pan MD,  Rohan Espinoza MD, Geraldine Hopper MD, Talia Hernandez MD,  Rom Musa DO, Gil Treadwell MD, Chandu Serna MD, Davey Warner DO, Liz Mathew MD,  Michael Celaya DO, Milly Schwarz MD, Jaclyn Cummings MD, Swapna Ferguson MD, Amado Dove MD,  Marvin Kaufman MD, Claribel Ochoa MD, Bella Cox MD, Alvino Blas MD, Adolfo Donnelly MD, Cassie Alvarado MD, Ezio Barrios DO, Gilberto Mendieta DO, Yeimy Martin MD,  Jesu Damon MD, Shirley Waterhouse, CNP,  Елена Inman CNP, Devin Sepulveda, CNP,  Karyn Urena, SHAWANDA, Tracy Guerra, CNP, Marilin Wiggins, CNP, Tasha Gonzales CNP, Donna Macario CNP, Lauren Vail, CNP, Danette Guadarrama, PA-C, Maya Bond PA-C, Violette Doty, CNP, Margarita Valdivia, CNP, Aurora Benites, CNS, Lelia Wilson, CNP, Candy Williamson CNP, Tracy Schwab, CNP         [facility]   IN-PATIENT SERVICE   [facility]    HISTORY AND PHYSICAL EXAMINATION            Date:   2/22/2024  Patient name:  Allegra Meza  Date of admission:  2/21/2024  9:30 PM  MRN:   [id number]  Account:  [id number]  YOB: 1950  PCP:    Solomon Woodard MD  Room:   [address] [place] [address]  Code Status:    Full Code    Chief Complaint:     Shortness of breath    History Obtained From:     EMR- patient has AMS     History of Present Illness:     Allegra Meza is a 73 y.o. Non- / non  female who presents with No chief complaint on file.   and is admitted to the hospital for the management of Community acquired pneumonia of left lower lobe of lung.    Patient is very [place] and oriented to self only. She was transferred from [facility] after being evaluated for fever, cough, and sore throat. CXR was completed and showed atelectasis vs left lower lobe pneumonia and she was  as ordered.   DM: Monitor BG AC & HS. SSI. Check A1c.   CHF: Cardiac meds as ordered. Monitor intake and output. 1800 mL FR daily. Daily weights.   Exacerbation COPD: See #1  Influenza A: Renal dose Tamiflu x 5 days  Cont Eliquis  AMS: Neurovasc check q 4 hours. Suspect 2/2 to fever and illness/ hypoxia. Keep SpO2 > 90%.     Consultations:   IP CONSULT TO NEPHROLOGY  IP CONSULT TO PULMONOLOGY     Patient is admitted as inpatient status because of co-morbidities listed above, severity of signs and symptoms as outlined, requirement for current medical therapies and most importantly because of direct risk to patient if care not provided in a hospital setting.  Expected length of stay > 48 hours.    ALTAF Henriquez NP  2/22/2024  1:28 AM    Copy sent to Solomon Maya MD

## 2024-02-22 NOTE — PROGRESS NOTES
Transitions of Care Pharmacy Service   Medication Review    The patient's list of current home medications has been reviewed.     Source(s) of information: surescripts, patient's     Based on information provided by the above source(s), I have updated the patient's home med list as described below.     Please review the ACTION REQUESTED section of this note for any discrepancies on current hospital orders.      I changed or updated the following medications on the patient's home medication list:  Discontinued N/A     Added Insulin glargine and lispro, leflunomide, levothyroxine, latanoprost, omeprazole, zofran, paxil, risperidone, prednisolone opth, vitamin d3     Adjusted   Suboxone, acyclovir, diltiazem     Other Notes N/A          PROVIDER ACTION REQUESTED  Medications that need to be addressed by a physician/nurse practitioner:    Medication Action Requested   Insulin glargine and lispro, leflunomide, levothyroxine, latanoprost, omeprazole, zofran, paxil, risperidone, prednisolone opth, vitamin d3     Please consider restarting home medications as appropriate.         Please feel free to call me with any questions about this encounter. Thank you.    This note will be reviewed and co-signed by the Transitions of Care Pharmacist.    Go SommersD student  Transitions of Care Pharmacy Service  Phone:  [phone]  Fax: [phone]      Electronically signed by Austin Monteiro on 2/22/2024 at 11:09 AM       Prior to Admission medications    Medication Sig Start Date End Date Taking? Authorizing Provider   carvedilol (COREG) 12.5 MG tablet Take 1 tablet by mouth 2 times daily (with meals)   Yes Nereida Aguilar MD   ALPRAZolam (XANAX) 1 MG tablet Take 1 tablet by mouth 3 times daily as needed for Anxiety. Max Daily Amount: 3 mg   Yes Nereida Aguilar MD   apixaban (ELIQUIS) 5 MG TABS tablet Take 1 tablet by mouth 2 times daily   Yes Nereida Aguilar MD   buprenorphine-naloxone (SUBOXONE)

## 2024-02-22 NOTE — PROGRESS NOTES
Physical Therapy  DATE: 2024    NAME: Allegra Meza  MRN: [id number]   : 1950    Patient not seen this date for Physical Therapy due to:      [] Cancel by RN or physician due to:    [] Hemodialysis    [] Critical Lab Value Level     [] Blood transfusion in progress    [] Acute or unstable cardiovascular status   _MAP < 55 or more than >115  _HR < 40 or > 130    [] Acute or unstable pulmonary status   -FiO2 > 60%   _RR < 5 or >40    _O2 sats < 85%    [] Strict Bedrest    [] Off Unit for surgery or procedure    [] Off Unit for testing       [] Pending imaging to R/O fracture    [] Refusal by Patient      [x] Other RN okayed patient for PT. Therapist initiated PT eval (~20 minutes) and therapist unable to obtain social history due to patient alert and only oriented to self. No family in room.  Unable to mobilize patient due to L anterior thigh dialysis cath and then dialysis entered room to start dialysis.  PT continue to follow.  Still needs PT eval.      [] PT being discontinued at this time. Patient independent. No further needs.     [] PT being discontinued at this time as the patient has been transferred to hospice care. No further needs.      Beena Gaona, PT

## 2024-02-22 NOTE — PLAN OF CARE
Problem: Respiratory - Adult  Goal: Achieves optimal ventilation and oxygenation  2/22/2024 0816 by Nancy Garces RCP  Outcome: Progressing  Flowsheets (Taken 2/22/2024 0200 by Stephanie Finney RN)  Achieves optimal ventilation and oxygenation:   Assess for changes in respiratory status   Assess for changes in mentation and behavior   Position to facilitate oxygenation and minimize respiratory effort   Oxygen supplementation based on oxygen saturation or arterial blood gases   Respiratory therapy support as indicated     Problem: Respiratory - Adult  Goal: Able to breathe comfortably  2/22/2024 0816 by Nancy Garces RCP  Outcome: Progressing

## 2024-02-22 NOTE — CONSULTS
Reason for Consult:  End stage renal disease.    Requesting Physician:  Vaibhav Hilliard DO    HISTORY OF PRESENT ILLNESS:    The patient is a 73 y.o. female who [place] [facility] [place] under the care of Dr Louise admitted with Influenza A and left lung pneumonia and CHF. Today herhis potassium level is 5.0.    Review Of Systems:   Unable to obtain because of clinical condition.     Past Medical History:   Diagnosis Date    Anxiety     Asthma     Atrial fibrillation (HCC)     CAD (coronary artery disease)     CHF (congestive heart failure) (HCC)     COPD (chronic obstructive pulmonary disease) (HCC)     DM (diabetes mellitus), type 2 (HCC)     ESRD on dialysis (HCC)     Fibromyalgia     Fibromyalgia     Hearing loss     Heart disease     Herpes simplex virus (HSV) infection     History of blood transfusion     Hypertension     Kidney failure     LVH (left ventricular hypertrophy)     Mitral valve regurgitation     On home O2     Osteoporosis     PAD (peripheral artery disease) ([facility])     Rheumatoid arthritis (HCC)        Past Surgical History:   Procedure Laterality Date    AV FISTULA REPAIR      x 2    BLADDER REPAIR       SECTION       SECTION       SECTION      CORONARY ANGIOPLASTY WITH STENT PLACEMENT      HYSTERECTOMY (CERVIX STATUS UNKNOWN)      w/o bso    JOINT REPLACEMENT      LITHOTRIPSY      PACEMAKER PLACEMENT      TONSILLECTOMY      TUBAL LIGATION      TYMPANOPLASTY         Prior to Admission medications    Medication Sig Start Date End Date Taking? Authorizing Provider   carvedilol (COREG) 12.5 MG tablet Take 1 tablet by mouth 2 times daily (with meals)   Yes Nereida Aguilar MD   ALPRAZolam (XANAX) 1 MG tablet Take 1 tablet by mouth 3 times daily as needed for Anxiety. Max Daily Amount: 3 mg   Yes ProviderNereida MD   dilTIAZem HCl  MG TB24 Take by mouth   Yes ProviderNereida MD   apixaban (ELIQUIS) 5

## 2024-02-22 NOTE — PROGRESS NOTES
End Of Shift Note  [facility] ICU  Summary of shift: Pt brought over to ICU because of possible need for precedex to help tolerate bipap. Precedex never ordered or needed. Bipap not needed. Pt sating well on heated high flow. Plan is for dialysis today. Hydralazine given for high BP. Pt only oriented to self and keeps saying she wants to go home. Reorienting pt and reassurance helps.    Vitals:    Vitals:    02/22/24 0400 02/22/24 0445 02/22/24 0500 02/22/24 0600   BP: (!) 191/75  (!) 171/58 (!) 170/63   Pulse: 71 70 70 70   Resp: 24 17 (!) 31 27   Temp: 99.7 °F (37.6 °C)      TempSrc: Oral      SpO2: 94% 97% 95% 96%   Weight:       Height:            I&O: No intake or output data in the 24 hours ending 02/22/24 0743    Resp Status: Heated High flow 40% 50L    Ventilator Settings:     / / /FiO2 : (S) 40 %    Critical Care IV infusions:   dextrose      sodium chloride          LDA:   Peripheral IV 02/21/24 Distal;Left;Anterior Forearm (Active)   Number of days: 1       Tunneled Hemodialysis Catheter Left Thigh (Active)   Number of days:

## 2024-02-22 NOTE — PROGRESS NOTES
End Of Shift Note  [facility]  Summary of shift: Patient remains only oriented to self, has become less lethargic, slow to respond and very hard of hearing.  Per  and son this is her baseline. Anxiety improved. CT head at [facility] was negative on 2/21, no repeat at this time.  Family requested to meet with palliative to discuss goals of cares, palliative consult placed. Received Dialysis 3.4L removed, tolerated well. Blood pressure improved, prn medication given and one time dose labetalol given. Failed video swallow. Remains NPO, no oral medications given. NG ordered, two unsuccessful attempts, both nares attempted, attending notified. Dietary consult placed, pending successful NG placement. Continues on levaquin and solumedrol started. MRSA swab pending. Troponin elevated, trending x2 q6h. Med rec being completed by pharmacy. Small smear BM. Oxygen requirements weaned to room air, lungs remain wheezing, crackles, denies shortness of breath.       Vitals:    Vitals:    02/22/24 1600 02/22/24 1734 02/22/24 1746 02/22/24 1800   BP: (!) 164/94 (!) 186/75 (!) 180/72 (!) 182/70   Pulse: 70 70 72 70   Resp: 20 17 28 13   Temp: 98 °F (36.7 °C)      TempSrc: Temporal      SpO2: 97% (S) 95%  96%   Weight:       Height:            I&O:   Intake/Output Summary (Last 24 hours) at 2/22/2024 1822  Last data filed at 2/22/2024 1637  Gross per 24 hour   Intake 670.87 ml   Output 4000 ml   Net -3329.13 ml       Resp Status: O2 weaned to room air, lung sounds wheezing, crackles     Ventilator Settings:     / / /FiO2 : 40 %    Critical Care IV infusions:   dextrose      sodium chloride Stopped (02/22/24 1414)        LDA:   Peripheral IV 02/21/24 Distal;Left;Anterior Forearm (Active)   Number of days: 1       Tunneled Hemodialysis Catheter Left Thigh (Active)   Number of days:

## 2024-02-22 NOTE — PLAN OF CARE
Problem: Respiratory - Adult  Goal: Achieves optimal ventilation and oxygenation  Outcome: Progressing     Problem: Respiratory - Adult  Goal: Able to breathe comfortably  Outcome: Progressing

## 2024-02-22 NOTE — PROGRESS NOTES
Patient transferred to ICU for closer monitoring as patient refusing bipap at this time. Spoke with patient's son Yony who would clarified life-saving measures may include, CPR, defib, meds, but no intubation.     Yony updated on patient transfer to ICU- no further questions at this time.

## 2024-02-22 NOTE — RT PROTOCOL NOTE
RT Inhaler-Nebulizer Bronchodilator Protocol Note    There is a bronchodilator order in the chart from a provider indicating to follow the RT Bronchodilator Protocol and there is an “Initiate RT Inhaler-Nebulizer Bronchodilator Protocol” order as well (see protocol at bottom of note).    CXR Findings:  XR CHEST PORTABLE    Result Date: 2/22/2024  1. Persistent opacification of the left lung base in keeping with underlying effusion, atelectasis or pneumonia. 2. Improvement in the aeration of the right lung.     XR CHEST PORTABLE    Result Date: 2/22/2024  Cardiomegaly with diffuse bilateral interstitial and airspace opacities, which may represent pulmonary edema and/or pneumonia.  Possible left pleural effusion.       The findings from the last RT Protocol Assessment were as follows:   History Pulmonary Disease: Chronic pulmonary disease  Respiratory Pattern: Dyspnea on exertion or RR 21-25 bpm  Breath Sounds: Intermittent or unilateral wheezes  Cough: Strong, spontaneous, non-productive  Indication for Bronchodilator Therapy: Decreased or absent breath sounds  Bronchodilator Assessment Score: 8    Aerosolized bronchodilator medication orders have been revised according to the RT Inhaler-Nebulizer Bronchodilator Protocol below.    Respiratory Therapist to perform RT Therapy Protocol Assessment initially then follow the protocol.  Repeat RT Therapy Protocol Assessment PRN for score 0-3 or on second treatment, BID, and PRN for scores above 3.    No Indications - adjust the frequency to every 6 hours PRN wheezing or bronchospasm, if no treatments needed after 48 hours then discontinue using Per Protocol order mode.     If indication present, adjust the RT bronchodilator orders based on the Bronchodilator Assessment Score as indicated below.  Use Inhaler orders unless patient has one or more of the following: on home nebulizer, not able to hold breath for 10 seconds, is not alert and oriented, cannot activate and use MDI  correctly, or respiratory rate 25 breaths per minute or more, then use the equivalent nebulizer order(s) with same Frequency and PRN reasons based on the score.  If a patient is on this medication at home then do not decrease Frequency below that used at home.    0-3 - enter or revise RT bronchodilator order(s) to equivalent RT Bronchodilator order with Frequency of every 4 hours PRN for wheezing or increased work of breathing using Per Protocol order mode.        4-6 - enter or revise RT Bronchodilator order(s) to two equivalent RT bronchodilator orders with one order with BID Frequency and one order with Frequency of every 4 hours PRN wheezing or increased work of breathing using Per Protocol order mode.        7-10 - enter or revise RT Bronchodilator order(s) to two equivalent RT bronchodilator orders with one order with TID Frequency and one order with Frequency of every 4 hours PRN wheezing or increased work of breathing using Per Protocol order mode.       11-13 - enter or revise RT Bronchodilator order(s) to one equivalent RT bronchodilator order with QID Frequency and an Albuterol order with Frequency of every 4 hours PRN wheezing or increased work of breathing using Per Protocol order mode.      Greater than 13 - enter or revise RT Bronchodilator order(s) to one equivalent RT bronchodilator order with every 4 hours Frequency and an Albuterol order with Frequency of every 2 hours PRN wheezing or increased work of breathing using Per Protocol order mode.     RT to enter RT Home Evaluation for COPD & MDI Assessment order using Per Protocol order mode.    Electronically signed by Nancy Garces RCP on 2/22/2024 at 6:06 PM

## 2024-02-22 NOTE — PROGRESS NOTES
HEMODIALYSIS PRE-TREATMENT NOTE    Patient Identifiers prior to treatment: yes    Isolation Required: yes        Isolation Type:air    Pt. Does not have covid            Hepatitis status:                           Date Drawn                             Result  Hepatitis B Surface Antigen 2/05/2024 neg        Hepatitis B Surface Antibody 1/2/2023 neg        Hepatitis B Core Antibody 4/07/2022 neg          How was Hepatitis Status verified: yes      Was a copy of the labs you documented provided to facility for the patient's chart: yes    Hemodialysis orders verified: yes    Access Within normal limits ( I.e. s/s of infection,...): yes    Pre-Assessment completed: yes    Pre-dialysis report received from: Gurvinder                      Time: 1019

## 2024-02-22 NOTE — CONSULTS
Pulmonary Medicine and Critical Care Consult    Patient - Allegra Meza   MRN -  [id number]   [id number]   - 1950      Date of Admission -  2024  9:30 PM  Date of evaluation -  2024  Room - [address] [place] [address]   Hospital Day - 1  Consulting - Vaibhav Hilliard DO Primary Care Physician - Solomon oWodard MD     Reason for Consult      ICU management/respiratory failure  Assessment/recommendations   Chronic hypoxic respiratory failure  Oxygen by nasal cannula high flow as needed to saturation above 90%  BiPAP support as needed  Incentive spirometry every hour awake    Pulmonary edema/possible aspiration  Hemodialysis fluid removal per nephrology  Speech swallow evaluation    Influenza A/COPD exacerbation  DuoNeb by nebulizer  Droplet isolation  Tamiflu; need to confirm able to take oral otherwise need NG insertion  Solu-Medrol 40 IV every 8 hours/monitor blood sugar  Levaquin empirically/check EKG make sure QTc not above 450 hold in that case  MRSA swab  Follow-up chest x-ray    Change mental status   CT brain no contrast/liver function test ammonia    Hypertension-diabetes  BP controlled/monitor blood sugar    A-fib/CHF/CAD/PAD   Check troponin BNP  On Eliquis    rheumatoid arthritis  Rheumatology follow-up    Precautions prophylaxis  DVT prophylaxis on Eliquis  Problem List      Patient Active Problem List   Diagnosis    Pneumonia due to organism    Community acquired pneumonia of left lower lobe of lung    Presence of cardiac pacemaker    Rheumatoid arthritis (HCC)    Peripheral vascular disorder due to diabetes mellitus (HCC)    Obstructive sleep apnea syndrome    Hypertension    End stage renal failure on dialysis (HCC)    Diabetes mellitus with peripheral vascular disease (HCC)    Congestive heart failure (HCC)    Arteriosclerosis of coronary artery    Acute exacerbation of chronic obstructive pulmonary disease ([facility])    NSTEMI (non-ST elevated myocardial infarction) ([facility])    Pure

## 2024-02-22 NOTE — RT PROTOCOL NOTE
RT Inhaler-Nebulizer Bronchodilator Protocol Note    There is a bronchodilator order in the chart from a provider indicating to follow the RT Bronchodilator Protocol and there is an “Initiate RT Inhaler-Nebulizer Bronchodilator Protocol” order as well (see protocol at bottom of note).    CXR Findings:  No results found.    The findings from the last RT Protocol Assessment were as follows:   History Pulmonary Disease: Chronic pulmonary disease  Respiratory Pattern: Dyspnea on exertion or RR 21-25 bpm  Breath Sounds: Inspiratory and expiratory or bilateral wheezing and/or rhonchi  Cough: Strong, spontaneous, non-productive  Indication for Bronchodilator Therapy:    Bronchodilator Assessment Score: 10    Aerosolized bronchodilator medication orders have been revised according to the RT Inhaler-Nebulizer Bronchodilator Protocol below.    Respiratory Therapist to perform RT Therapy Protocol Assessment initially then follow the protocol.  Repeat RT Therapy Protocol Assessment PRN for score 0-3 or on second treatment, BID, and PRN for scores above 3.    No Indications - adjust the frequency to every 6 hours PRN wheezing or bronchospasm, if no treatments needed after 48 hours then discontinue using Per Protocol order mode.     If indication present, adjust the RT bronchodilator orders based on the Bronchodilator Assessment Score as indicated below.  Use Inhaler orders unless patient has one or more of the following: on home nebulizer, not able to hold breath for 10 seconds, is not alert and oriented, cannot activate and use MDI correctly, or respiratory rate 25 breaths per minute or more, then use the equivalent nebulizer order(s) with same Frequency and PRN reasons based on the score.  If a patient is on this medication at home then do not decrease Frequency below that used at home.    0-3 - enter or revise RT bronchodilator order(s) to equivalent RT Bronchodilator order with Frequency of every 4 hours PRN for wheezing or  increased work of breathing using Per Protocol order mode.        4-6 - enter or revise RT Bronchodilator order(s) to two equivalent RT bronchodilator orders with one order with BID Frequency and one order with Frequency of every 4 hours PRN wheezing or increased work of breathing using Per Protocol order mode.        7-10 - enter or revise RT Bronchodilator order(s) to two equivalent RT bronchodilator orders with one order with TID Frequency and one order with Frequency of every 4 hours PRN wheezing or increased work of breathing using Per Protocol order mode.       11-13 - enter or revise RT Bronchodilator order(s) to one equivalent RT bronchodilator order with QID Frequency and an Albuterol order with Frequency of every 4 hours PRN wheezing or increased work of breathing using Per Protocol order mode.      Greater than 13 - enter or revise RT Bronchodilator order(s) to one equivalent RT bronchodilator order with every 4 hours Frequency and an Albuterol order with Frequency of every 2 hours PRN wheezing or increased work of breathing using Per Protocol order mode.     RT to enter RT Home Evaluation for COPD & MDI Assessment order using Per Protocol order mode.    Electronically signed by Beena Ding RCP on 2/21/2024 at 10:06 PM

## 2024-02-22 NOTE — PROGRESS NOTES
Physician Progress Note      PATIENT:               JANETT JAQUEZ  CSN #:                  [id number]  :                       1950  ADMIT DATE:       2024 9:30 PM  DISCH DATE:  RESPONDING  PROVIDER #:        Rosita Eaton MD          QUERY TEXT:    Pt admitted with PNA.  Pt noted to have sob. If possible, please document in   the progress notes and discharge summary if you are evaluating and/or treating   any of the following:    The medical record reflects the following:  Risk Factors: PNA, Flu A, COPD, CHF  Clinical Indicators: Found to be hypoxic - SpO2 of 88% and placed on   supplemental o2 - HFNC, tachypneic with RR as high as 40, note of increased   work of breathing, labored and shallow breathing, expiratory wheezing  Treatment: supplemental o2, ICU monitoring    Thank you,  Tisha COELHO RN  Options provided:  -- Acute respiratory failure with hypoxia  -- Acute respiratory failure with hypercapnia  -- Acute respiratory failure with hypoxia and hypercapnia  -- Other - I will add my own diagnosis  -- Disagree - Not applicable / Not valid  -- Disagree - Clinically unable to determine / Unknown  -- Refer to Clinical Documentation Reviewer    PROVIDER RESPONSE TEXT:    This patient is in acute respiratory failure with hypoxia.    Query created by: Jeanne Wiggins on 2024 5:44 AM      Electronically signed by:  Rosita Eaton MD 2024 8:50 AM

## 2024-02-22 NOTE — CARE COORDINATION
Case Management Assessment  Initial Evaluation    Date/Time of Evaluation: 2/22/2024 3:44 PM  Assessment Completed by: Kaylie Trimble RN    If patient is discharged prior to next notation, then this note serves as note for discharge by case management.    Patient Name: Allegra Meza                   YOB: 1950  Diagnosis: Community acquired pneumonia of left lower lobe of lung [J18.9]                   Date / Time: 2/21/2024  9:30 PM    Patient Admission Status: Inpatient   Readmission Risk (Low < 19, Mod (19-27), High > 27): Readmission Risk Score: 17.7    Current PCP: Solomon Woodard MD  PCP verified by CM? Yes (Dr. Bai)    Chart Reviewed: Yes      History Provided by:    Patient Orientation: Alert and Oriented, Self    Patient Cognition: Alert    Hospitalization in the last 30 days (Readmission):  No    If yes, Readmission Assessment in  Navigator will be completed.    Advance Directives:      Code Status: DNR-CCA   Patient's Primary Decision Maker is: Legal Next of Kin      Discharge Planning:    Patient lives with: Spouse/Significant Other, Children Type of Home: House  Primary Care Giver: Spouse  Patient Support Systems include: Spouse/Significant Other, Children   Current Financial resources: None  Current community resources: None  Current services prior to admission: Durable Medical Equipment, C-pap            Current DME: Wheelchair, Walker, Shower Chair, Oxygen Therapy (Comment) (2L prn thru [facility], rails around toilet.)            Type of Home Care services:  OT, PT, Skilled Therapy, Nursing Services    ADLS  Prior functional level: Assistance with the following:, Bathing, Cooking, Housework, Shopping, Mobility  Current functional level: Assistance with the following:, Bathing, Dressing, Toileting, Cooking, Housework, Shopping, Mobility    PT AM-PAC:   /24  OT AM-PAC:   /24    Family can provide assistance at DC: Yes  Would you like Case Management to discuss the  associated with the providers was provided to: Patient Representative   Patient Representative Name: spouse Davey     The Patient and/or Patient Representative Agree with the Discharge Plan? Yes    Kaylie Trimbel RN  Case Management Department  Ph: [phone] Fax: [phone]

## 2024-02-23 ENCOUNTER — APPOINTMENT (OUTPATIENT)
Dept: INTERVENTIONAL RADIOLOGY/VASCULAR | Age: 74
DRG: 193 | End: 2024-02-23
Attending: FAMILY MEDICINE
Payer: MEDICARE

## 2024-02-23 LAB
AMMONIA PLAS-SCNC: 13 UMOL/L (ref 11–51)
ANION GAP SERPL CALCULATED.3IONS-SCNC: 15 MMOL/L (ref 9–17)
BASOPHILS # BLD: 0.02 K/UL (ref 0–0.2)
BASOPHILS NFR BLD: 1 % (ref 0–2)
BUN SERPL-MCNC: 35 MG/DL (ref 8–23)
BUN/CREAT SERPL: 6 (ref 9–20)
CALCIUM SERPL-MCNC: 8.6 MG/DL (ref 8.6–10.4)
CHLORIDE SERPL-SCNC: 101 MMOL/L (ref 98–107)
CO2 SERPL-SCNC: 20 MMOL/L (ref 20–31)
CREAT SERPL-MCNC: 5.9 MG/DL (ref 0.5–0.9)
EOSINOPHIL # BLD: 0 K/UL (ref 0–0.44)
EOSINOPHILS RELATIVE PERCENT: 0 % (ref 1–4)
ERYTHROCYTE [DISTWIDTH] IN BLOOD BY AUTOMATED COUNT: 15.7 % (ref 11.8–14.4)
GFR SERPL CREATININE-BSD FRML MDRD: 7 ML/MIN/1.73M2
GLUCOSE BLD-MCNC: 178 MG/DL (ref 65–105)
GLUCOSE BLD-MCNC: 215 MG/DL (ref 65–105)
GLUCOSE BLD-MCNC: 291 MG/DL (ref 65–105)
GLUCOSE BLD-MCNC: 302 MG/DL (ref 65–105)
GLUCOSE SERPL-MCNC: 320 MG/DL (ref 70–99)
HCT VFR BLD AUTO: 37.5 % (ref 36.3–47.1)
HGB BLD-MCNC: 11.6 G/DL (ref 11.9–15.1)
IMM GRANULOCYTES # BLD AUTO: 0 K/UL (ref 0–0.3)
IMM GRANULOCYTES NFR BLD: 0 %
LYMPHOCYTES NFR BLD: 0.22 K/UL (ref 1.1–3.7)
LYMPHOCYTES RELATIVE PERCENT: 11 % (ref 24–43)
MCH RBC QN AUTO: 31.5 PG (ref 25.2–33.5)
MCHC RBC AUTO-ENTMCNC: 30.9 G/DL (ref 28.4–34.8)
MCV RBC AUTO: 101.9 FL (ref 82.6–102.9)
MONOCYTES NFR BLD: 0.06 K/UL (ref 0.1–1.2)
MONOCYTES NFR BLD: 3 % (ref 3–12)
MRSA, DNA, NASAL: NEGATIVE
NEUTROPHILS NFR BLD: 85 % (ref 36–65)
NEUTS SEG NFR BLD: 1.7 K/UL (ref 1.5–8.1)
NRBC BLD-RTO: 0 PER 100 WBC
PHOSPHATE SERPL-MCNC: 5.8 MG/DL (ref 2.6–4.5)
PLATELET # BLD AUTO: 147 K/UL (ref 138–453)
PMV BLD AUTO: 10.4 FL (ref 8.1–13.5)
POTASSIUM SERPL-SCNC: 5.2 MMOL/L (ref 3.7–5.3)
RBC # BLD AUTO: 3.68 M/UL (ref 3.95–5.11)
SODIUM SERPL-SCNC: 136 MMOL/L (ref 135–144)
SPECIMEN DESCRIPTION: NORMAL
TROPONIN I SERPL HS-MCNC: 166 NG/L (ref 0–14)
WBC OTHER # BLD: 2 K/UL (ref 3.5–11.3)

## 2024-02-23 PROCEDURE — 97163 PT EVAL HIGH COMPLEX 45 MIN: CPT

## 2024-02-23 PROCEDURE — 2700000000 HC OXYGEN THERAPY PER DAY

## 2024-02-23 PROCEDURE — 94640 AIRWAY INHALATION TREATMENT: CPT

## 2024-02-23 PROCEDURE — 99222 1ST HOSP IP/OBS MODERATE 55: CPT | Performed by: NURSE PRACTITIONER

## 2024-02-23 PROCEDURE — 97530 THERAPEUTIC ACTIVITIES: CPT

## 2024-02-23 PROCEDURE — 97166 OT EVAL MOD COMPLEX 45 MIN: CPT

## 2024-02-23 PROCEDURE — 6370000000 HC RX 637 (ALT 250 FOR IP): Performed by: INTERNAL MEDICINE

## 2024-02-23 PROCEDURE — 85025 COMPLETE CBC W/AUTO DIFF WBC: CPT

## 2024-02-23 PROCEDURE — 97535 SELF CARE MNGMENT TRAINING: CPT

## 2024-02-23 PROCEDURE — 36415 COLL VENOUS BLD VENIPUNCTURE: CPT

## 2024-02-23 PROCEDURE — 2500000003 HC RX 250 WO HCPCS: Performed by: INTERNAL MEDICINE

## 2024-02-23 PROCEDURE — 6360000002 HC RX W HCPCS: Performed by: NURSE PRACTITIONER

## 2024-02-23 PROCEDURE — 99232 SBSQ HOSP IP/OBS MODERATE 35: CPT | Performed by: INTERNAL MEDICINE

## 2024-02-23 PROCEDURE — 82947 ASSAY GLUCOSE BLOOD QUANT: CPT

## 2024-02-23 PROCEDURE — 94761 N-INVAS EAR/PLS OXIMETRY MLT: CPT

## 2024-02-23 PROCEDURE — 6370000000 HC RX 637 (ALT 250 FOR IP): Performed by: NURSE PRACTITIONER

## 2024-02-23 PROCEDURE — 80048 BASIC METABOLIC PNL TOTAL CA: CPT

## 2024-02-23 PROCEDURE — 97116 GAIT TRAINING THERAPY: CPT

## 2024-02-23 PROCEDURE — 84484 ASSAY OF TROPONIN QUANT: CPT

## 2024-02-23 PROCEDURE — 6360000002 HC RX W HCPCS: Performed by: INTERNAL MEDICINE

## 2024-02-23 PROCEDURE — 2580000003 HC RX 258: Performed by: INTERNAL MEDICINE

## 2024-02-23 PROCEDURE — 43752 NASAL/OROGASTRIC W/TUBE PLMT: CPT

## 2024-02-23 PROCEDURE — 90935 HEMODIALYSIS ONE EVALUATION: CPT

## 2024-02-23 PROCEDURE — 84100 ASSAY OF PHOSPHORUS: CPT

## 2024-02-23 PROCEDURE — 2580000003 HC RX 258: Performed by: NURSE PRACTITIONER

## 2024-02-23 PROCEDURE — 82140 ASSAY OF AMMONIA: CPT

## 2024-02-23 PROCEDURE — 2709999900 IR PLACE NG TUBE BY DR W FLUORO

## 2024-02-23 PROCEDURE — 2060000000 HC ICU INTERMEDIATE R&B

## 2024-02-23 RX ORDER — ALPRAZOLAM 1 MG/1
1 TABLET ORAL 3 TIMES DAILY PRN
Status: DISCONTINUED | OUTPATIENT
Start: 2024-02-23 | End: 2024-02-28 | Stop reason: HOSPADM

## 2024-02-23 RX ORDER — LANOLIN ALCOHOL/MO/W.PET/CERES
3 CREAM (GRAM) TOPICAL ONCE
Status: COMPLETED | OUTPATIENT
Start: 2024-02-23 | End: 2024-02-23

## 2024-02-23 RX ORDER — IPRATROPIUM BROMIDE AND ALBUTEROL SULFATE 2.5; .5 MG/3ML; MG/3ML
1 SOLUTION RESPIRATORY (INHALATION)
Status: DISCONTINUED | OUTPATIENT
Start: 2024-02-23 | End: 2024-02-28 | Stop reason: HOSPADM

## 2024-02-23 RX ORDER — DIPHENHYDRAMINE HYDROCHLORIDE 50 MG/ML
12.5 INJECTION INTRAMUSCULAR; INTRAVENOUS ONCE
Status: DISCONTINUED | OUTPATIENT
Start: 2024-02-23 | End: 2024-02-23

## 2024-02-23 RX ORDER — INSULIN GLARGINE 100 [IU]/ML
15 INJECTION, SOLUTION SUBCUTANEOUS NIGHTLY
Status: DISCONTINUED | OUTPATIENT
Start: 2024-02-23 | End: 2024-02-28 | Stop reason: HOSPADM

## 2024-02-23 RX ADMIN — INSULIN LISPRO 2 UNITS: 100 INJECTION, SOLUTION INTRAVENOUS; SUBCUTANEOUS at 12:15

## 2024-02-23 RX ADMIN — SODIUM CHLORIDE: 9 INJECTION, SOLUTION INTRAVENOUS at 08:36

## 2024-02-23 RX ADMIN — SODIUM CHLORIDE, PRESERVATIVE FREE 10 ML: 5 INJECTION INTRAVENOUS at 20:42

## 2024-02-23 RX ADMIN — PREDNISOLONE ACETATE 1 DROP: 10 SUSPENSION/ DROPS OPHTHALMIC at 20:06

## 2024-02-23 RX ADMIN — INSULIN GLARGINE 15 UNITS: 100 INJECTION, SOLUTION SUBCUTANEOUS at 20:02

## 2024-02-23 RX ADMIN — CARVEDILOL 12.5 MG: 12.5 TABLET, FILM COATED ORAL at 17:51

## 2024-02-23 RX ADMIN — IPRATROPIUM BROMIDE AND ALBUTEROL SULFATE 1 DOSE: 2.5; .5 SOLUTION RESPIRATORY (INHALATION) at 19:19

## 2024-02-23 RX ADMIN — ONDANSETRON 4 MG: 2 INJECTION INTRAMUSCULAR; INTRAVENOUS at 23:52

## 2024-02-23 RX ADMIN — LATANOPROST 1 DROP: 50 SOLUTION OPHTHALMIC at 20:07

## 2024-02-23 RX ADMIN — Medication 3 MG: at 23:35

## 2024-02-23 RX ADMIN — IPRATROPIUM BROMIDE AND ALBUTEROL SULFATE 1 DOSE: .5; 2.5 SOLUTION RESPIRATORY (INHALATION) at 08:34

## 2024-02-23 RX ADMIN — ONDANSETRON 4 MG: 2 INJECTION INTRAMUSCULAR; INTRAVENOUS at 07:54

## 2024-02-23 RX ADMIN — INSULIN LISPRO 3 UNITS: 100 INJECTION, SOLUTION INTRAVENOUS; SUBCUTANEOUS at 07:55

## 2024-02-23 RX ADMIN — WATER 40 MG: 1 INJECTION INTRAMUSCULAR; INTRAVENOUS; SUBCUTANEOUS at 04:34

## 2024-02-23 RX ADMIN — PREDNISOLONE ACETATE 1 DROP: 10 SUSPENSION/ DROPS OPHTHALMIC at 17:52

## 2024-02-23 RX ADMIN — HYDRALAZINE HYDROCHLORIDE 20 MG: 20 INJECTION INTRAMUSCULAR; INTRAVENOUS at 08:06

## 2024-02-23 RX ADMIN — PREDNISOLONE ACETATE 1 DROP: 10 SUSPENSION/ DROPS OPHTHALMIC at 08:07

## 2024-02-23 RX ADMIN — APIXABAN 5 MG: 5 TABLET, FILM COATED ORAL at 19:57

## 2024-02-23 RX ADMIN — WATER 40 MG: 1 INJECTION INTRAMUSCULAR; INTRAVENOUS; SUBCUTANEOUS at 17:50

## 2024-02-23 RX ADMIN — SODIUM CHLORIDE, PRESERVATIVE FREE 10 ML: 5 INJECTION INTRAVENOUS at 07:56

## 2024-02-23 RX ADMIN — Medication 3.2 ML: at 15:44

## 2024-02-23 RX ADMIN — Medication 3.3 ML: at 15:43

## 2024-02-23 RX ADMIN — LEVOFLOXACIN 250 MG: 250 INJECTION, SOLUTION INTRAVENOUS at 08:40

## 2024-02-23 ASSESSMENT — PAIN SCALES - GENERAL: PAINLEVEL_OUTOF10: 0

## 2024-02-23 NOTE — PROGRESS NOTES
End Of Shift Note  [facility] ICU  Summary of shift: Patient is more alert and appropriate today. Oriented x3, intermittent disorientation/orientation. No coughing with bedside swallow, repeat video swallow unable to be completed due to dialysis vs speech schedule. For nutrition and oral medication admin IR placed NG, dietary consult placed and oral meds given. Room air all shift. Received prn hydralazine once. Dialysis 3L removed, tolerated well. Levaquin changed to rocephin. Had formed bowel movement. Plan to be back on normal MWF dialysis schedule now. Video swallow on Monday. Start tube feed once consult complete. Stood at bedside with PT/OT    Vitals:    Vitals:    02/23/24 1526 02/23/24 1600 02/23/24 1751 02/23/24 1800   BP:  136/61 (!) 150/64 (!) 171/73   Pulse:  70 70 70   Resp:  16  13   Temp: 97.5 °F (36.4 °C) 97.5 °F (36.4 °C)     TempSrc: Oral      SpO2:  94%  93%   Weight:  67.5 kg (148 lb 13 oz)     Height:            I&O:   Intake/Output Summary (Last 24 hours) at 2/23/2024 1841  Last data filed at 2/23/2024 1820  Gross per 24 hour   Intake 678.51 ml   Output 3000 ml   Net -2321.49 ml       Resp Status: Room air when awake, 2L during sleep. Lungs Rhonchi, wheezing     Ventilator Settings:     / / /FiO2 : 40 %    Critical Care IV infusions:   dextrose      sodium chloride Stopped (02/23/24 1610)        LDA:   Peripheral IV 02/21/24 Distal;Left;Anterior Forearm (Active)   Number of days: 2       NG/OG/NJ/NE Tube Nasogastric 16 fr Right nostril (Active)   Number of days: 0       Tunneled Hemodialysis Catheter Left Thigh (Active)   Number of days:

## 2024-02-23 NOTE — PROGRESS NOTES
Reason for Follow up: ESRD.    HISTORY:    No new complaints.    Review Of Systems:   Constitutional: No fever, chills, lethargy, weakness or wt loss.  Cardiac:  No chest pain, dyspnea, orthopnea or PND.  Pulmonary:  No cough, phlegm or wheezing.  Abdomen:  No abdominal pain, nausea, vomiting or diarrhea.  :   No hematuria, pyuria, dysuria or flank pain.  Extremities:  No swelling or joint pains..     Scheduled Meds:   ipratropium 0.5 mg-albuterol 2.5 mg  1 Dose Inhalation BID RT    levofloxacin  250 mg IntraVENous Q24H    apixaban  5 mg Oral BID    carvedilol  12.5 mg Oral BID WC    insulin lispro  0-4 Units SubCUTAneous TID WC    insulin lispro  0-4 Units SubCUTAneous Nightly    methylPREDNISolone  40 mg IntraVENous Q8H    insulin glargine  25 Units SubCUTAneous Nightly    latanoprost  1 drop Left Eye Nightly    prednisoLONE acetate  1 drop Left Eye TID    oseltamivir  30 mg Oral Once    oseltamivir  30 mg Oral Once per day on Mon Wed Fri    sodium chloride flush  5-40 mL IntraVENous 2 times per day   Continuous Infusions:   dextrose      sodium chloride 15 mL/hr at 02/23/24 1124     PRN Meds:hydrALAZINE, glucose, dextrose bolus **OR** dextrose bolus, glucagon (rDNA), dextrose, anticoagulant sodium citrate, anticoagulant sodium citrate, sodium chloride flush, sodium chloride, ondansetron **OR** ondansetron, acetaminophen **OR** acetaminophen, albuterol    Allergies   Allergen Reactions    Amlodipine Swelling    Betamethasone Other (See Comments)     States \"passed out\"    Diclofenac-Misoprostol Hives    Penicillins Rash and Hives    Phenylephrine-Guaifenesin Hives    Propranolol Hives    Quetiapine Anxiety     States caused anxiety attacks    Adhesive Tape     Clonidine Derivatives     Cymbalta [Duloxetine Hcl]     Entex T [Pseudoephedrine-Guaifenesin]     Inderal La [Propranolol Hcl]     Mometasone Other (See Comments)    Codeine Anxiety, Headaches and Other (See Comments)     States makes her jittery

## 2024-02-23 NOTE — RT PROTOCOL NOTE
RT Inhaler-Nebulizer Bronchodilator Protocol Note    There is a bronchodilator order in the chart from a provider indicating to follow the RT Bronchodilator Protocol and there is an “Initiate RT Inhaler-Nebulizer Bronchodilator Protocol” order as well (see protocol at bottom of note).    CXR Findings:  XR CHEST PORTABLE    Result Date: 2/22/2024  1. Persistent opacification of the left lung base in keeping with underlying effusion, atelectasis or pneumonia. 2. Improvement in the aeration of the right lung.     XR CHEST PORTABLE    Result Date: 2/22/2024  Cardiomegaly with diffuse bilateral interstitial and airspace opacities, which may represent pulmonary edema and/or pneumonia.  Possible left pleural effusion.       The findings from the last RT Protocol Assessment were as follows:   History Pulmonary Disease: Chronic pulmonary disease  Respiratory Pattern: Dyspnea on exertion or RR 21-25 bpm  Breath Sounds: Intermittent or unilateral wheezes  Cough: Strong, spontaneous, non-productive  Indication for Bronchodilator Therapy: Decreased or absent breath sounds  Bronchodilator Assessment Score: 8    Aerosolized bronchodilator medication orders have been revised according to the RT Inhaler-Nebulizer Bronchodilator Protocol below.    Respiratory Therapist to perform RT Therapy Protocol Assessment initially then follow the protocol.  Repeat RT Therapy Protocol Assessment PRN for score 0-3 or on second treatment, BID, and PRN for scores above 3.    No Indications - adjust the frequency to every 6 hours PRN wheezing or bronchospasm, if no treatments needed after 48 hours then discontinue using Per Protocol order mode.     If indication present, adjust the RT bronchodilator orders based on the Bronchodilator Assessment Score as indicated below.  Use Inhaler orders unless patient has one or more of the following: on home nebulizer, not able to hold breath for 10 seconds, is not alert and oriented, cannot activate and use MDI  correctly, or respiratory rate 25 breaths per minute or more, then use the equivalent nebulizer order(s) with same Frequency and PRN reasons based on the score.  If a patient is on this medication at home then do not decrease Frequency below that used at home.    0-3 - enter or revise RT bronchodilator order(s) to equivalent RT Bronchodilator order with Frequency of every 4 hours PRN for wheezing or increased work of breathing using Per Protocol order mode.        4-6 - enter or revise RT Bronchodilator order(s) to two equivalent RT bronchodilator orders with one order with BID Frequency and one order with Frequency of every 4 hours PRN wheezing or increased work of breathing using Per Protocol order mode.        7-10 - enter or revise RT Bronchodilator order(s) to two equivalent RT bronchodilator orders with one order with TID Frequency and one order with Frequency of every 4 hours PRN wheezing or increased work of breathing using Per Protocol order mode.       11-13 - enter or revise RT Bronchodilator order(s) to one equivalent RT bronchodilator order with QID Frequency and an Albuterol order with Frequency of every 4 hours PRN wheezing or increased work of breathing using Per Protocol order mode.      Greater than 13 - enter or revise RT Bronchodilator order(s) to one equivalent RT bronchodilator order with every 4 hours Frequency and an Albuterol order with Frequency of every 2 hours PRN wheezing or increased work of breathing using Per Protocol order mode.     RT to enter RT Home Evaluation for COPD & MDI Assessment order using Per Protocol order mode.    Electronically signed by Marie Espinoza RCP on 2/22/2024 at 7:32 PM

## 2024-02-23 NOTE — PROGRESS NOTES
Comprehensive Nutrition Assessment    Type and Reason for Visit:  Initial, Consult    Nutrition Recommendations/Plan:   NPO   Monitor labs as they become available   Monitor skin integrity/weights     Malnutrition Assessment:  Malnutrition Status:  No malnutrition (02/23/24 1010)    Context:        Findings of the 6 clinical characteristics of malnutrition:  Energy Intake:     Weight Loss:        Body Fat Loss:        Muscle Mass Loss:       Fluid Accumulation:        Strength:       Nutrition Assessment:    Patient was transferred from [facility] to [place] overnight after she was diagnosed with the flu and was being treated for left lower lobe pneumonia.  She had worsening oxygen requirement on the floor oxygen saturation in the 80s required high flow. Seen by consult for tube feed management. Patient refuses NG tube at this time, tube feed will not be able to initiate without NG. Recommendations is to have another speech eval today to determine diet now that she is more alert. noted to be on room air when awake, did have dialysis 2/22.  If NG is intiated recommend Nepro at 20 ml/hour, and increase by ten every 4 hours until a goal rate of 42 ml/hour is achieved, this will provide 1814 kcals, 82 grams of protein and 733 ml of water from formula. weight on 2/23 was 153#, weigh thistory 2/22 was 154# Monitor for diet advancement add supplements as appropriate recommnend nepro twice daily since she is renal patient. Monitor weights, labs, skin integrity.    Nutrition Related Findings:    BM 2/23 active sounds, Refused NG tube. Wound Type: None       Current Nutrition Intake & Therapies:    Average Meal Intake: NPO  Average Supplements Intake: NPO  Diet NPO    Anthropometric Measures:  Height: 165.1 cm (5' 5\")  Ideal Body Weight (IBW): 125 lbs (57 kg)       Current Body Weight: 69.4 kg (153 lb), 122.4 % IBW. Weight Source: Bed Scale  Current BMI (kg/m2): 25.5        Weight Adjustment For: No

## 2024-02-23 NOTE — PROGRESS NOTES
End Of Shift Note  [place] [facility]  Summary of shift: No change in orientation, but patient seems to be a little more alert this morning, calling out appropriately and using bed pan. She did have a BM, no urine output assessed. Refused for NG placement. PRN hydralazine given once over night. Patient complaints of having a dry mouth for most of the night, wet sponges given to moisten mouth as well as chapstick for her lips. Lantus held per NP.   Vitals:    Vitals:    02/23/24 0400 02/23/24 0500 02/23/24 0600 02/23/24 0700   BP: (!) 179/69 (!) 176/61 (!) 179/69 (!) 176/69   Pulse: 70 70 70 70   Resp: 15 16 13 15   Temp: 98.7 °F (37.1 °C)      TempSrc: Oral      SpO2: 97% 97% 97% 98%   Weight:       Height:            I&O:   Intake/Output Summary (Last 24 hours) at 2/23/2024 0711  Last data filed at 2/22/2024 1637  Gross per 24 hour   Intake 670.87 ml   Output 4000 ml   Net -3329.13 ml       Resp Status: Placed on 2 liters per patient request; was on room air with an SPO2 of 92%    Ventilator Settings:     / / /FiO2 : 40 %    Critical Care IV infusions:   dextrose      sodium chloride Stopped (02/22/24 1414)        LDA:   Peripheral IV 02/21/24 Distal;Left;Anterior Forearm (Active)   Number of days: 2       Tunneled Hemodialysis Catheter Left Thigh (Active)   Number of days:

## 2024-02-23 NOTE — PROGRESS NOTES
Updated on call NP of patients blood sugar, NPO status and lantus ordered. Writer to hold lantus at this time.

## 2024-02-23 NOTE — PLAN OF CARE
Problem: Nutrition Deficit:  Goal: Optimize nutritional status  2/22/2024 2123 by Georgiana Francois, RN  Outcome: Not Progressing  Note: Failed swallow study, need NG for nutrition but is refusing placement at this time.   2/22/2024 1909 by Varsha Espino, RN  Outcome: Progressing     Problem: Nutrition Deficit:  Goal: Optimize nutritional status  2/22/2024 2123 by Georgiana Francois, RN  Outcome: Not Progressing  Note: Failed swallow study, need NG for nutrition but is refusing placement at this time.   2/22/2024 1909 by Varsha Espino, RN  Outcome: Progressing

## 2024-02-23 NOTE — PROGRESS NOTES
Physical Therapy  Facility/Department: [facility] ICU  Physical Therapy Initial Assessment    Name: Allegra Meza  : 1950  MRN: [id number]  Date of Service: 2024    Per H&P:73 y.o. Non- / non  female who presents with No chief complaint on file and is admitted to the hospital for the management of Community acquired pneumonia of left lower lobe of lung.  Patient is very [place] and oriented to self only. She was transferred from [facility] after being evaluated for fever, cough, and sore throat. CXR was completed and showed atelectasis vs left lower lobe pneumonia and she was given 750 mg Levaquin. She is a dialysis patient, and decision was made to transfer patient to [facility] for further management of LLL pneumonia. She tested positive for influenza A. Upon arrival to the PCU, patient was found to be sl hypoxic at 88%, tachypneic with increased work of breathing. ABGs showed pCO2 27.8, pO2 54.1, pHCO3 19.2, pH 7.4. She was placed on HFNC and SpO2 continued to stay in the 80s with no change in work of breathing. Another CXR was taken and showed cardiomegaly with diffuse bilateral interstitial and airspace opacities, suggestive of pulmonary edema and or pneumonia. She has a PMH of COPD and wears O2 at home, atrial fib on Eliquis, anxiety, GERD, CHF, and HTN. Pulmonary consult was placed. BP was noted to be very high with SBP in the 190s- patient missed HD treatment today due to illness        CHRIS Maddox reports patient is medically stable for therapy treatment this date.    Chart reviewed prior to treatment and patient is agreeable for therapy.  All lines intact and patient positioned comfortably at end of treatment.  All patient needs addressed prior to ending therapy session.     Discharge Recommendations:  Patient would benefit from continued therapy after discharge   Pt currently functioning below baseline.  Recommend daily inpatient skilled therapy at time of discharge to maximize long  Treatment limited secondary to decreased cognition     Plan   Physical Therapy Plan  General Plan: 5-7 times per week  Current Treatment Recommendations: Strengthening, Balance training, Functional mobility training, Transfer training, Gait training, Patient/Caregiver education & training, Safety education & training, Home exercise program, Therapeutic activities  Safety Devices  Type of Devices: Call light within reach, Left in bed, Patient at risk for falls, Gait belt, Nurse notified, Bed alarm in place, All nely prominences offloaded, All fall risk precautions in place, Heels elevated for pressure relief  Restraints  Restraints Initially in Place: No     Restrictions  Restrictions/Precautions  Restrictions/Precautions: Fall Risk, Isolation, Bed Alarm, Up as Tolerated, General Precautions  Required Braces or Orthoses?: No  Position Activity Restriction  Other position/activity restrictions: LUE IV, telemetry, continuous pulse ox, external urinary catheter, L thigh dialysis catheter, flu+, ALARMS     Subjective   General  Patient assessed for rehabilitation services?: Yes  Family / Caregiver Present: No  Subjective  Subjective: Patient supine in bed with HOB elevated. RN okayed patient for PT eval and OOB activity. Patient agreeable.         Social/Functional History  Social/Functional History  Lives With: Spouse  Type of Home: House  Home Layout: One level  Home Access: Stairs to enter with rails  Entrance Stairs - Number of Steps: 3 with HR  Bathroom Shower/Tub: Walk-in shower (Patient reports she has a walk in shower but she has help with sponge bathing and does not use shower)  Bathroom Toilet: Handicap height (Reports she pushes up from sink to stand and has assistance with toileting)  Home Equipment: Rollator, Wheelchair-manual  Has the patient had two or more falls in the past year or any fall with injury in the past year?: No ('I did fall once.  I tried to put toothpase on my tooth brush and I fell in the

## 2024-02-23 NOTE — PROGRESS NOTES
Occupational Therapy  Facility/Department: [facility] ICU  Occupational Therapy Initial Assessment    Name: Allegra Meza  : 1950  MRN: [id number]  Date of Service: 2024    CHRIS Maddox reports patient is medically stable for therapy treatment this date.    Chart reviewed prior to treatment and patient is agreeable for therapy.  All lines intact and patient positioned comfortably at end of treatment.  All patient needs addressed prior to ending therapy session.       Pt currently functioning below baseline.  Recommend daily inpatient skilled therapy at time of discharge to maximize long term outcomes and prevent re-admission. Please refer to AM-PAC score for current level of function.     Discharge Recommendations:  Patient would benefit from continued therapy after discharge  OT Equipment Recommendations  Equipment Needed:  (CTA)       Per H&P: Allegra Meza is a 73 y.o. Non- / non  female who presents with No chief complaint on file. and is admitted to the hospital for the management of Community acquired pneumonia of left lower lobe of lung.     Patient is very [place] and oriented to self only. She was transferred from [facility] after being evaluated for fever, cough, and sore throat. CXR was completed and showed atelectasis vs left lower lobe pneumonia and she was given 750 mg Levaquin. She is a dialysis patient, and decision was made to transfer patient to [facility] for further management of LLL pneumonia. She tested positive for influenza A. Upon arrival to the PCU, patient was found to be sl hypoxic at 88%, tachypneic with increased work of breathing. ABGs showed pCO2 27.8, pO2 54.1, pHCO3 19.2, pH 7.4. She was placed on HFNC and SpO2 continued to stay in the 80s with no change in work of breathing. Another CXR was taken and showed cardiomegaly with diffuse bilateral interstitial and airspace opacities, suggestive of pulmonary edema and or pneumonia. She has a PMH of COPD and

## 2024-02-23 NOTE — PROGRESS NOTES
fever, cough, and sore throat. CXR was completed and showed atelectasis vs left lower lobe pneumonia and she was given 750 mg Levaquin. She is a dialysis patient, and decision was made to transfer patient to [facility] for further management of LLL pneumonia. She tested positive for influenza A. Upon arrival to the PCU, patient was found to be sl hypoxic at 88%, tachypneic with increased work of breathing. ABGs showed pCO2 27.8, pO2 54.1, pHCO3 19.2, pH 7.4. She was placed on HFNC and SpO2 continued to stay in the 80s with no change in work of breathing. Another CXR was taken and showed cardiomegaly with diffuse bilateral interstitial and airspace opacities, suggestive of pulmonary edema and or pneumonia. She has a PMH of COPD and wears O2 at home, atrial fib on Eliquis, anxiety, GERD, CHF, and HTN. Pulmonary consult was placed. BP was noted to be very high with SBP in the 190s- patient missed HD treatment today due to illness. \"    Review of Systems:     Constitutional:  negative for chills, fevers, sweats  Respiratory:  negative for cough, dyspnea on exertion, shortness of breath, wheezing  Cardiovascular:  negative for chest pain, chest pressure/discomfort, lower extremity edema, palpitations  Gastrointestinal:  negative for abdominal pain, constipation, diarrhea, nausea, vomiting  Neurological:  negative for dizziness, headache    Medications:     Allergies:    Allergies   Allergen Reactions    Amlodipine Swelling    Betamethasone Other (See Comments)     States \"passed out\"    Diclofenac-Misoprostol Hives    Penicillins Rash and Hives    Phenylephrine-Guaifenesin Hives    Propranolol Hives    Quetiapine Anxiety     States caused anxiety attacks    Adhesive Tape     Clonidine Derivatives     Cymbalta [Duloxetine Hcl]     Entex T [Pseudoephedrine-Guaifenesin]     Inderal La [Propranolol Hcl]     Mometasone Other (See Comments)    Codeine Anxiety, Headaches and Other (See Comments)     States makes her jittery     Morphine Headaches and Nausea Only     \"makes me sick to my stomach\"    Sulfamethoxazole-Trimethoprim Other (See Comments)     UNKNOWN    Tapentadol Anxiety       Current Meds:   Scheduled Meds:    ipratropium 0.5 mg-albuterol 2.5 mg  1 Dose Inhalation BID RT    levofloxacin  250 mg IntraVENous Q24H    apixaban  5 mg Oral BID    carvedilol  12.5 mg Oral BID     insulin lispro  0-4 Units SubCUTAneous TID WC    insulin lispro  0-4 Units SubCUTAneous Nightly    methylPREDNISolone  40 mg IntraVENous Q8H    insulin glargine  25 Units SubCUTAneous Nightly    latanoprost  1 drop Left Eye Nightly    prednisoLONE acetate  1 drop Left Eye TID    oseltamivir  30 mg Oral Once    oseltamivir  30 mg Oral Once per day on Mon Wed Fri    sodium chloride flush  5-40 mL IntraVENous 2 times per day     Continuous Infusions:    dextrose      sodium chloride 15 mL/hr at 02/23/24 0836     PRN Meds: hydrALAZINE, glucose, dextrose bolus **OR** dextrose bolus, glucagon (rDNA), dextrose, anticoagulant sodium citrate, anticoagulant sodium citrate, sodium chloride flush, sodium chloride, ondansetron **OR** ondansetron, acetaminophen **OR** acetaminophen, albuterol    Data:     Past Medical History:   has a past medical history of Anxiety, Asthma, Atrial fibrillation ([facility]), CAD (coronary artery disease), CHF (congestive heart failure) ([facility]), COPD (chronic obstructive pulmonary disease) ([facility]), DM (diabetes mellitus), type 2 ([facility]), ESRD on dialysis ([facility]), Fibromyalgia, Fibromyalgia, Hearing loss, Heart disease, Herpes simplex virus (HSV) infection, History of blood transfusion, Hypertension, Kidney failure, LVH (left ventricular hypertrophy), Mitral valve regurgitation, On home O2, Osteoporosis, PAD (peripheral artery disease) ([facility]), and Rheumatoid arthritis ([facility]).    Social History:   reports that she has quit smoking. Her smoking use included cigarettes. She has never used smokeless tobacco. She reports that she does not currently use alcohol.

## 2024-02-23 NOTE — PROGRESS NOTES
Speech unable to do video swallow due to dialysis schedule. Order received for IR to place NG. Explained to patient. To IR for NG placement.   Placed in right nare, patient tolerated well. Dietary order placed for tube feed order and management. Okay to use NG.

## 2024-02-23 NOTE — PROGRESS NOTES
HEMODIALYSIS POST TREATMENT NOTE    Treatment time ordered: 210    Actual treatment time: 210    UltraFiltration Goal: 1255-4918  UltraFiltration Removed: 2500      Pre Treatment weight: 69.8  Post Treatment weight: 67.5  Estimated Dry Weight: 67.5 new dry weight    Access used:     Central Venous Catheter:          Tunneled or Non-tunneled: Tunneled           Site: Lt thigh          Access Flow: great        Sign and symptoms of infection: no       If YES: na    Medications or blood products given: none    Chronic outpatient schedule: MWF    Chronic outpatient unit:  Renal care     Summary of response to treatment: Tolerated tx very well with a new dry weight of 67.5kg.    Explain if orders NOT met, was physician notified:edelmira      ACES flowsheet faxed to patient unit/ placed in patient chart: yes    Post assessment completed: yes    Report given to: Varsha       * Intra-treatment documented Safety Checks include the followin) Access and face visible at all times.     2) All connections and blood lines are secure with no kinks.     3) NVL alarm engaged.     4) Hemosafe device applied (if applicable).     5) No collapse of Arterial or Venous blood chambers.     6) All blood lines / pump segments in the air detectors.

## 2024-02-23 NOTE — PLAN OF CARE
Problem: Chronic Conditions and Co-morbidities  Goal: Patient's chronic conditions and co-morbidity symptoms are monitored and maintained or improved  2/23/2024 0900 by Varsha Espino RN  Outcome: Progressing  Flowsheets (Taken 2/23/2024 0900)  Care Plan - Patient's Chronic Conditions and Co-Morbidity Symptoms are Monitored and Maintained or Improved:   Monitor and assess patient's chronic conditions and comorbid symptoms for stability, deterioration, or improvement   Collaborate with multidisciplinary team to address chronic and comorbid conditions and prevent exacerbation or deterioration   Update acute care plan with appropriate goals if chronic or comorbid symptoms are exacerbated and prevent overall improvement and discharge     Problem: Discharge Planning  Goal: Discharge to home or other facility with appropriate resources  2/23/2024 0900 by Varsha Espino RN  Outcome: Progressing  Flowsheets (Taken 2/23/2024 0900)  Discharge to home or other facility with appropriate resources:   Identify barriers to discharge with patient and caregiver   Arrange for needed discharge resources and transportation as appropriate   Identify discharge learning needs (meds, wound care, etc)   Refer to discharge planning if patient needs post-hospital services based on physician order or complex needs related to functional status, cognitive ability or social support system     Problem: Respiratory - Adult  Goal: Achieves optimal ventilation and oxygenation  2/23/2024 0900 by Varsha Espino RN  Outcome: Progressing     Problem: Safety - Adult  Goal: Free from fall injury  2/23/2024 0900 by Varsha Espino RN  Outcome: Progressing     Problem: Nutrition Deficit:  Goal: Optimize nutritional status  2/23/2024 0900 by Varsha Espino RN  Outcome: Progressing  Flowsheets (Taken 2/23/2024 0900)  Nutrient intake appropriate for improving, restoring, or maintaining nutritional needs: Assess nutritional status and  recommend course of action     Problem: Skin/Tissue Integrity  Goal: Absence of new skin breakdown  Description: 1.  Monitor for areas of redness and/or skin breakdown  2.  Assess vascular access sites hourly  3.  Every 4-6 hours minimum:  Change oxygen saturation probe site  4.  Every 4-6 hours:  If on nasal continuous positive airway pressure, respiratory therapy assess nares and determine need for appliance change or resting period.  2/23/2024 0900 by Varsha Espino RN  Outcome: Progressing     Problem: ABCDS Injury Assessment  Goal: Absence of physical injury  2/23/2024 0900 by Varsha Espino, RN  Outcome: Progressing     Problem: Nutrition Deficit:  Goal: Optimize nutritional status  2/23/2024 0900 by Varsha Espino RN  Outcome: Progressing  Flowsheets (Taken 2/23/2024 0900)  Nutrient intake appropriate for improving, restoring, or maintaining nutritional needs: Assess nutritional status and recommend course of action  2/22/2024 2123 by Georgiana Francois, RN  Outcome: Not Progressing  Note: Failed swallow study, need NG for nutrition but is refusing placement at this time.   2/22/2024 1909 by Varsha Espino, RN  Outcome: Progressing

## 2024-02-23 NOTE — PLAN OF CARE
respiratory therapy assess nares and determine need for appliance change or resting period.  Outcome: Progressing

## 2024-02-23 NOTE — RT PROTOCOL NOTE
RT Inhaler-Nebulizer Bronchodilator Protocol Note    There is a bronchodilator order in the chart from a provider indicating to follow the RT Bronchodilator Protocol and there is an “Initiate RT Inhaler-Nebulizer Bronchodilator Protocol” order as well (see protocol at bottom of note).    CXR Findings:  XR CHEST PORTABLE    Result Date: 2/22/2024  1. Persistent opacification of the left lung base in keeping with underlying effusion, atelectasis or pneumonia. 2. Improvement in the aeration of the right lung.     XR CHEST PORTABLE    Result Date: 2/22/2024  Cardiomegaly with diffuse bilateral interstitial and airspace opacities, which may represent pulmonary edema and/or pneumonia.  Possible left pleural effusion.       The findings from the last RT Protocol Assessment were as follows:   History Pulmonary Disease: Chronic pulmonary disease  Respiratory Pattern: Dyspnea on exertion or RR 21-25 bpm  Breath Sounds: Slightly diminished and/or crackles  Cough: Strong, spontaneous, non-productive  Indication for Bronchodilator Therapy: Decreased or absent breath sounds  Bronchodilator Assessment Score: 6    Aerosolized bronchodilator medication orders have been revised according to the RT Inhaler-Nebulizer Bronchodilator Protocol below.    Respiratory Therapist to perform RT Therapy Protocol Assessment initially then follow the protocol.  Repeat RT Therapy Protocol Assessment PRN for score 0-3 or on second treatment, BID, and PRN for scores above 3.    No Indications - adjust the frequency to every 6 hours PRN wheezing or bronchospasm, if no treatments needed after 48 hours then discontinue using Per Protocol order mode.     If indication present, adjust the RT bronchodilator orders based on the Bronchodilator Assessment Score as indicated below.  Use Inhaler orders unless patient has one or more of the following: on home nebulizer, not able to hold breath for 10 seconds, is not alert and oriented, cannot activate and use  MDI correctly, or respiratory rate 25 breaths per minute or more, then use the equivalent nebulizer order(s) with same Frequency and PRN reasons based on the score.  If a patient is on this medication at home then do not decrease Frequency below that used at home.    0-3 - enter or revise RT bronchodilator order(s) to equivalent RT Bronchodilator order with Frequency of every 4 hours PRN for wheezing or increased work of breathing using Per Protocol order mode.        4-6 - enter or revise RT Bronchodilator order(s) to two equivalent RT bronchodilator orders with one order with BID Frequency and one order with Frequency of every 4 hours PRN wheezing or increased work of breathing using Per Protocol order mode.        7-10 - enter or revise RT Bronchodilator order(s) to two equivalent RT bronchodilator orders with one order with TID Frequency and one order with Frequency of every 4 hours PRN wheezing or increased work of breathing using Per Protocol order mode.       11-13 - enter or revise RT Bronchodilator order(s) to one equivalent RT bronchodilator order with QID Frequency and an Albuterol order with Frequency of every 4 hours PRN wheezing or increased work of breathing using Per Protocol order mode.      Greater than 13 - enter or revise RT Bronchodilator order(s) to one equivalent RT bronchodilator order with every 4 hours Frequency and an Albuterol order with Frequency of every 2 hours PRN wheezing or increased work of breathing using Per Protocol order mode.     RT to enter RT Home Evaluation for COPD & MDI Assessment order using Per Protocol order mode.    Electronically signed by PABLO RODRIGUEZ RCP on 2/23/2024 at 8:51 AM

## 2024-02-23 NOTE — CARE COORDINATION
Social work: Noted PT/OT recommendation of SNF.  Spoke to spouse and he is agreeable, choices given of [facility] in [place] or [facility].  Per spouse, patient was at [facility] 12/5-1/25/2024, therefore should have about 50 skilled days available.  Pt dialysis is MWF 11:00-3:00PM  [facility].   Referrals sent to [facility] in [place] and [facility].  HENS started.                        Post Acute Facility/Agency List     Provided  spouse  with the following list, the list includes the overall star ratings obtained from CMS per the Medicare Web site (www.Medicare.gov):     [] Long Term Acute Care Facilities  [] Acute Inpatient Rehabilitation Facilities  [x] Skilled Nursing Facilities  [] Home Care    Provided verbal instructions on how to utilize the QR Code to obtain additional detailed star ratings from www.Medicare.gov     offered to print and provide the detailed list:    []Accepted   [x]Declined

## 2024-02-23 NOTE — CONSULTS
2021    End stage renal failure on dialysis (HCC) [N18.6, Z99.2] 2020    Presence of cardiac pacemaker [Z95.0] 04/15/2016    Hypertension [I10] 2014    Congestive heart failure (HCC) [I50.9] 2014       PAST MEDICAL HISTORY      Diagnosis Date    Anxiety     Asthma     Atrial fibrillation (HCC)     CAD (coronary artery disease)     CHF (congestive heart failure) (HCC)     COPD (chronic obstructive pulmonary disease) (HCC)     DM (diabetes mellitus), type 2 (HCC)     ESRD on dialysis (HCC)     Fibromyalgia     Fibromyalgia     Hearing loss     Heart disease     Herpes simplex virus (HSV) infection     History of blood transfusion     Hypertension     Kidney failure     LVH (left ventricular hypertrophy)     Mitral valve regurgitation     On home O2     Osteoporosis     PAD (peripheral artery disease) (HCC)     Rheumatoid arthritis (HCC)        PAST SURGICAL HISTORY  Past Surgical History:   Procedure Laterality Date    AV FISTULA REPAIR      x 2    BLADDER REPAIR       SECTION       SECTION       SECTION      CORONARY ANGIOPLASTY WITH STENT PLACEMENT      HYSTERECTOMY (CERVIX STATUS UNKNOWN)      w/o bso    JOINT REPLACEMENT      LITHOTRIPSY      PACEMAKER PLACEMENT      TONSILLECTOMY      TUBAL LIGATION      TYMPANOPLASTY         SOCIAL HISTORY  Social History     Tobacco Use    Smoking status: Former     Types: Cigarettes    Smokeless tobacco: Never   Vaping Use    Vaping Use: Never used   Substance Use Topics    Alcohol use: Not Currently    Drug use: Never       FAMILY HISTORY  ..  Family History   Problem Relation Age of Onset    Prostate Cancer Father     Hypertension Mother     Heart Attack Brother         ALLERGIES  Allergies   Allergen Reactions    Amlodipine Swelling    Betamethasone Other (See Comments)     States \"passed out\"    Diclofenac-Misoprostol Hives    Penicillins Rash and Hives    Phenylephrine-Guaifenesin Hives    Propranolol Hives     02/23/2024 03:44 AM    CREATININE 5.9 02/23/2024 03:44 AM    GLUCOSE 320 02/23/2024 03:44 AM    CALCIUM 8.6 02/23/2024 03:44 AM    , No results found for: \"INR\", \"PROTIME\", .  Lab Results   Component Value Date    ALT 7 02/22/2024    AST 12 02/22/2024    ALKPHOS 139 (H) 02/22/2024    BILITOT 0.3 02/22/2024   , No results found for: \"CKTOTAL\", \"CKMB\", \"CKMBINDEX\", \"TROPONINI\", No results found for: \"VOL\", \"APPEARANCE\", \"COLORU\", \"LABSPEC\", \"LABPH\", \"LEUKBLD\", \"NITRU\", \"GLUCOSEU\", \"KETUA\", \"UROBILINOGEN\", \"BILIRUBINUR\", \"OCBU\", No results found for: \"AMYLASE\", No results found for: \"LIPASE\", No results found for: \"DDIMER\", No results found for: \"BNP\", No results found for: \"CEA\", No results found for: \"\", No results found for: \"AFPAMN\", No results found for: \"LACTA\",     CT Result (most recent):  No results found for this or any previous visit from the past 3650 days.       Xray Result (most recent):  XR CHEST PORTABLE 02/22/2024    Narrative  EXAMINATION:  ONE XRAY VIEW OF THE CHEST    2/22/2024 5:14 pm    COMPARISON:  02/22/2024, 12:48 a.m.    HISTORY:  ORDERING SYSTEM PROVIDED HISTORY: thomas infiltrates  TECHNOLOGIST PROVIDED HISTORY:  thomas infiltrates  Reason for Exam: monitor bilateral infiltrates, 5 pm film    FINDINGS:  There is opacification of the left lung base, unchanged, in keeping with  underlying effusion, atelectasis or pneumonia.  There is improvement in the  aeration of the right lung.  There is suggestion of cardiomegaly.  Pacer  leads are in good position.  Patient is status post sternotomy.    Impression  1. Persistent opacification of the left lung base in keeping with underlying  effusion, atelectasis or pneumonia.  2. Improvement in the aeration of the right lung.       MRI Result (most recent):  No results found for this or any previous visit from the past 3650 days.      No results found for this or any previous visit.       Encounter Date: 02/21/24   EKG 12 Lead   Result Value

## 2024-02-23 NOTE — PROGRESS NOTES
Pulmonary Critical Care Progress Note    Patient seen for the follow up of Multifocal pneumonia     Subjective:    She has improved oxygen saturation now on room air.  She is alert but confused vented to self.  She had been NPO.  Multiple staff members tried NG placement and failed.  She is on hemodialysis.  Blood pressure has been on the high side.  Speech will not evaluate until after dialysis and they are not available in the afternoon.      Examination:    Vitals: /68   Pulse 71   Temp 97.5 °F (36.4 °C)   Resp 12   Ht 1.651 m (5' 5\")   Wt 67.5 kg (148 lb 13 oz)   SpO2 96%   BMI 24.76 kg/m²   SpO2  Av.8 %  Min: 91 %  Max: 98 %  General appearance: alert and cooperative with exam  Neck: No JVD  Lungs: Decreased breath sound no crackles or wheezing  Heart: regular rate and rhythm, S1, S2 normal, no gallop  Abdomen: Soft, non tender, + BS  Extremities: no cyanosis or clubbing. No significant edema    LABs:    CBC:   Recent Labs     24  0118 24  0344   WBC 7.1 2.0*   HGB 12.4 11.6*   HCT 40.5 37.5    147     BMP:   Recent Labs     24  0118 24  0344    136   K 5.0 5.2   CO2 16* 20   BUN 42* 35*   CREATININE 7.0* 5.9*   LABGLOM 6* 7*   GLUCOSE 253* 320*       LIVER PROFILE:  Recent Labs     24  1232   AST 12   ALT 7   LABALBU 3.7      Latest Reference Range & Units 24 12:32 24 19:23 24 03:44   Pro-BNP <300 pg/mL >70,000 (H)     Troponin, High Sensitivity 0 - 14 ng/L 214 (HH) 185 (HH) 166 (HH)      Latest Reference Range & Units 24 12:32   Albumin 3.5 - 5.2 g/dL 3.7   Alk Phos 35 - 104 U/L 139 (H)   ALT 5 - 33 U/L 7   AST <32 U/L 12   BILIRUBIN TOTAL 0.3 - 1.2 mg/dL 0.3   Bilirubin, Direct <0.3 mg/dL 0.1   Bilirubin, Indirect 0.0 - 1.0 mg/dL 0.2   Total Protein 6.4 - 8.3 g/dL 7.2   (H): Data is abnormally high    MRSA swab negative      Radiology:    Chest x-ray   1. Persistent opacification of the left lung base in keeping with

## 2024-02-23 NOTE — PROGRESS NOTES
SPIRITUAL CARE DEPARTMENT [facility]  PROGRESS NOTE    Room # 1104/1104-01   Name: Allegra Meza            Worship: None     Reason for visit: Routine    I visited the family.    Admit Date & Time: 2/21/2024  9:30 PM    Assessment:  Allegra Meza is a 73 y.o. female in the hospital because she has pneumonia.       Intervention:  I introduced myself and my title as  I offered space for ICU staff  to express feelings, needs, and concerns and provided a ministry presence. Patient hard of hearing and somewhat confused.  offers prayer outside of room due to influenza quarantine.    Outcome:  Patient at rest, no family present.    Plan:  Chaplains will remain available to offer spiritual and emotional support as needed.    Electronically signed by Chaplain Shawn, on 2/23/2024 at 3:55 PM.  Spiritual Care Department  [facility]     02/23/24 1553   Encounter Summary   Service Provided For: Family   Referral/Consult From: [facility]   Support System Spouse   Last Encounter  02/23/24   Complexity of Encounter Low   Begin Time 1400   End Time  1420   Total Time Calculated 20 min   Spiritual/Emotional needs   Type Spiritual Support   Assessment/Intervention/Outcome   Assessment Calm;Coping;Hopeful;Interrupted family processes   Intervention Active listening;Discussed illness injury and it’s impact;Explored/Affirmed feelings, thoughts, concerns;Nurtured Hope;Prayer (assurance of)/[place]aker;Sustaining Presence/Ministry of presence   Outcome Comfort;Connection/Belonging;Engaged in conversation;Expressed feelings, needs, and concerns;Receptive

## 2024-02-23 NOTE — PLAN OF CARE
Problem: Respiratory - Adult  Goal: Achieves optimal ventilation and oxygenation  2/23/2024 0850 by Marline Daley RCP  Outcome: Progressing  2/22/2024 2123 by Georgiana Francois RN  Outcome: Adequate for Discharge  2/22/2024 1932 by Ashley Espinoza RCP  Outcome: Progressing  2/22/2024 1909 by Varsha Espino RN  Outcome: Progressing  Goal: Able to breathe comfortably  2/23/2024 0850 by Marline Daley RCP  Outcome: Progressing  2/22/2024 1932 by Ashley Espinoza RCP  Outcome: Progressing

## 2024-02-23 NOTE — ACP (ADVANCE CARE PLANNING)
Advance Care Planning     Advance Care Planning (ACP) Physician/NP/PA Conversation    Date of Conversation: 2/21/2024  Conducted with:  Healthcare Decision Maker: Next of Kin by law (only applies in absence of above) (name)     Healthcare Decision Maker:      Primary Decision Maker: Davey Meza - Spouse - [phone]    Click here to complete Healthcare Decision Makers including selection of the Healthcare Decision Maker Relationship (ie \"Primary\")  Today we documented Decision Maker(s) consistent with Legal Next of Kin hierarchy.    Care Preferences:    Hospitalization:  \"If your health worsens and it becomes clear that your chance of recovery is unlikely, what would be your preference regarding hospitalization?\"  The patient would prefer hospitalization.    Ventilation:  \"If you were unable to breath on your own and your chance of recovery was unlikely, what would be your preference about the use of a ventilator (breathing machine) if it was available to you?\"  The patient would NOT desire the use of a ventilator.    Resuscitation:  \"In the event your heart stopped as a result of an underlying serious health condition, would you want attempts made to restart your heart, or would you prefer a natural death?\"  No, do NOT attempt to resuscitate.    treatment goals, benefit/burden of treatment options, ventilation preferences, hospitalization preferences, resuscitation preferences, and hospice care    Conversation Outcomes / Follow-Up Plan:  ACP in process - information provided, considering goals and options  Reviewed DNR/DNI and patient confirms current DNR status - completed forms on file (place new order if needed)    Length of Voluntary ACP Conversation in minutes:  <16 minutes (Non-Billable)    ARCELIA COATES, APRN - CNP

## 2024-02-23 NOTE — PLAN OF CARE
Problem: Chronic Conditions and Co-morbidities  Goal: Patient's chronic conditions and co-morbidity symptoms are monitored and maintained or improved  2/23/2024 1341 by Varsha Espino RN  Outcome: Progressing     Problem: Discharge Planning  Goal: Discharge to home or other facility with appropriate resources  2/23/2024 1341 by Varsha Espino RN  Outcome: Progressing     Problem: Respiratory - Adult  Goal: Achieves optimal ventilation and oxygenation  2/23/2024 1341 by Varsha Espino RN  Outcome: Progressing     Problem: Safety - Adult  Goal: Free from fall injury  2/23/2024 1341 by Varsha Espino RN  Outcome: Progressing     Problem: Nutrition Deficit:  Goal: Optimize nutritional status  2/23/2024 1341 by Varsha Espino RN  Outcome: Progressing     Problem: Skin/Tissue Integrity  Goal: Absence of new skin breakdown  Description: 1.  Monitor for areas of redness and/or skin breakdown  2.  Assess vascular access sites hourly  3.  Every 4-6 hours minimum:  Change oxygen saturation probe site  4.  Every 4-6 hours:  If on nasal continuous positive airway pressure, respiratory therapy assess nares and determine need for appliance change or resting period.  2/23/2024 1341 by Varsha Espino RN  Outcome: Progressing     Problem: ABCDS Injury Assessment  Goal: Absence of physical injury  2/23/2024 1341 by Varsha Espino RN  Outcome: Progressing

## 2024-02-23 NOTE — PLAN OF CARE
Problem: Respiratory - Adult  Goal: Achieves optimal ventilation and oxygenation  2/22/2024 1932 by Ashley Espinoza RCP  Outcome: Progressing     Problem: Respiratory - Adult  Goal: Able to breathe comfortably  2/22/2024 1932 by Ashley Espinoza RCP  Outcome: Progressing

## 2024-02-24 ENCOUNTER — APPOINTMENT (OUTPATIENT)
Dept: GENERAL RADIOLOGY | Age: 74
DRG: 193 | End: 2024-02-24
Attending: FAMILY MEDICINE
Payer: MEDICARE

## 2024-02-24 LAB
ANION GAP SERPL CALCULATED.3IONS-SCNC: 18 MMOL/L (ref 9–17)
BUN SERPL-MCNC: 31 MG/DL (ref 8–23)
BUN/CREAT SERPL: 8 (ref 9–20)
CALCIUM SERPL-MCNC: 8.3 MG/DL (ref 8.6–10.4)
CHLORIDE SERPL-SCNC: 93 MMOL/L (ref 98–107)
CO2 SERPL-SCNC: 20 MMOL/L (ref 20–31)
CREAT SERPL-MCNC: 3.9 MG/DL (ref 0.5–0.9)
GFR SERPL CREATININE-BSD FRML MDRD: 12 ML/MIN/1.73M2
GLUCOSE BLD-MCNC: 216 MG/DL (ref 65–105)
GLUCOSE BLD-MCNC: 244 MG/DL (ref 65–105)
GLUCOSE BLD-MCNC: 281 MG/DL (ref 65–105)
GLUCOSE BLD-MCNC: 282 MG/DL (ref 65–105)
GLUCOSE SERPL-MCNC: 203 MG/DL (ref 70–99)
PHOSPHATE SERPL-MCNC: 5.3 MG/DL (ref 2.6–4.5)
POTASSIUM SERPL-SCNC: 4.6 MMOL/L (ref 3.7–5.3)
SODIUM SERPL-SCNC: 131 MMOL/L (ref 135–144)

## 2024-02-24 PROCEDURE — 2580000003 HC RX 258: Performed by: NURSE PRACTITIONER

## 2024-02-24 PROCEDURE — 2700000000 HC OXYGEN THERAPY PER DAY

## 2024-02-24 PROCEDURE — 6370000000 HC RX 637 (ALT 250 FOR IP): Performed by: NURSE PRACTITIONER

## 2024-02-24 PROCEDURE — 6360000002 HC RX W HCPCS: Performed by: INTERNAL MEDICINE

## 2024-02-24 PROCEDURE — 6360000002 HC RX W HCPCS: Performed by: NURSE PRACTITIONER

## 2024-02-24 PROCEDURE — 71045 X-RAY EXAM CHEST 1 VIEW: CPT

## 2024-02-24 PROCEDURE — 6370000000 HC RX 637 (ALT 250 FOR IP): Performed by: INTERNAL MEDICINE

## 2024-02-24 PROCEDURE — 99232 SBSQ HOSP IP/OBS MODERATE 35: CPT | Performed by: INTERNAL MEDICINE

## 2024-02-24 PROCEDURE — 36415 COLL VENOUS BLD VENIPUNCTURE: CPT

## 2024-02-24 PROCEDURE — 84100 ASSAY OF PHOSPHORUS: CPT

## 2024-02-24 PROCEDURE — 97530 THERAPEUTIC ACTIVITIES: CPT | Performed by: NURSE PRACTITIONER

## 2024-02-24 PROCEDURE — 2580000003 HC RX 258: Performed by: INTERNAL MEDICINE

## 2024-02-24 PROCEDURE — 82947 ASSAY GLUCOSE BLOOD QUANT: CPT

## 2024-02-24 PROCEDURE — 2060000000 HC ICU INTERMEDIATE R&B

## 2024-02-24 PROCEDURE — 94761 N-INVAS EAR/PLS OXIMETRY MLT: CPT

## 2024-02-24 PROCEDURE — 2500000003 HC RX 250 WO HCPCS: Performed by: NURSE PRACTITIONER

## 2024-02-24 PROCEDURE — 94640 AIRWAY INHALATION TREATMENT: CPT

## 2024-02-24 PROCEDURE — 80048 BASIC METABOLIC PNL TOTAL CA: CPT

## 2024-02-24 RX ORDER — INSULIN LISPRO 100 [IU]/ML
0-4 INJECTION, SOLUTION INTRAVENOUS; SUBCUTANEOUS EVERY 6 HOURS
Status: DISCONTINUED | OUTPATIENT
Start: 2024-02-24 | End: 2024-02-24

## 2024-02-24 RX ORDER — INSULIN LISPRO 100 [IU]/ML
0-4 INJECTION, SOLUTION INTRAVENOUS; SUBCUTANEOUS EVERY 6 HOURS
Status: DISCONTINUED | OUTPATIENT
Start: 2024-02-24 | End: 2024-02-25

## 2024-02-24 RX ORDER — HYDRALAZINE HYDROCHLORIDE 20 MG/ML
20 INJECTION INTRAMUSCULAR; INTRAVENOUS EVERY 6 HOURS PRN
Status: DISCONTINUED | OUTPATIENT
Start: 2024-02-24 | End: 2024-02-28 | Stop reason: HOSPADM

## 2024-02-24 RX ORDER — HYDRALAZINE HYDROCHLORIDE 20 MG/ML
10 INJECTION INTRAMUSCULAR; INTRAVENOUS EVERY 6 HOURS PRN
Status: DISCONTINUED | OUTPATIENT
Start: 2024-02-24 | End: 2024-02-28 | Stop reason: HOSPADM

## 2024-02-24 RX ORDER — METOPROLOL TARTRATE 1 MG/ML
5 INJECTION, SOLUTION INTRAVENOUS ONCE
Status: COMPLETED | OUTPATIENT
Start: 2024-02-24 | End: 2024-02-24

## 2024-02-24 RX ADMIN — HYDRALAZINE HYDROCHLORIDE 20 MG: 20 INJECTION INTRAMUSCULAR; INTRAVENOUS at 22:25

## 2024-02-24 RX ADMIN — WATER 40 MG: 1 INJECTION INTRAMUSCULAR; INTRAVENOUS; SUBCUTANEOUS at 05:53

## 2024-02-24 RX ADMIN — SODIUM CHLORIDE, PRESERVATIVE FREE 10 ML: 5 INJECTION INTRAVENOUS at 08:35

## 2024-02-24 RX ADMIN — APIXABAN 5 MG: 5 TABLET, FILM COATED ORAL at 08:34

## 2024-02-24 RX ADMIN — HYDRALAZINE HYDROCHLORIDE 10 MG: 20 INJECTION INTRAMUSCULAR; INTRAVENOUS at 09:02

## 2024-02-24 RX ADMIN — SODIUM CHLORIDE, PRESERVATIVE FREE 10 ML: 5 INJECTION INTRAVENOUS at 20:10

## 2024-02-24 RX ADMIN — WATER 40 MG: 1 INJECTION INTRAMUSCULAR; INTRAVENOUS; SUBCUTANEOUS at 16:53

## 2024-02-24 RX ADMIN — HYDRALAZINE HYDROCHLORIDE 10 MG: 20 INJECTION INTRAMUSCULAR; INTRAVENOUS at 16:29

## 2024-02-24 RX ADMIN — METOPROLOL TARTRATE 5 MG: 5 INJECTION INTRAVENOUS at 06:23

## 2024-02-24 RX ADMIN — PREDNISOLONE ACETATE 1 DROP: 10 SUSPENSION/ DROPS OPHTHALMIC at 13:50

## 2024-02-24 RX ADMIN — WATER 1000 MG: 1 INJECTION INTRAMUSCULAR; INTRAVENOUS; SUBCUTANEOUS at 06:02

## 2024-02-24 RX ADMIN — ONDANSETRON 4 MG: 2 INJECTION INTRAMUSCULAR; INTRAVENOUS at 06:02

## 2024-02-24 RX ADMIN — CARVEDILOL 12.5 MG: 12.5 TABLET, FILM COATED ORAL at 08:33

## 2024-02-24 RX ADMIN — PREDNISOLONE ACETATE 1 DROP: 10 SUSPENSION/ DROPS OPHTHALMIC at 10:44

## 2024-02-24 RX ADMIN — IPRATROPIUM BROMIDE AND ALBUTEROL SULFATE 1 DOSE: 2.5; .5 SOLUTION RESPIRATORY (INHALATION) at 19:30

## 2024-02-24 RX ADMIN — INSULIN LISPRO 2 UNITS: 100 INJECTION, SOLUTION INTRAVENOUS; SUBCUTANEOUS at 16:44

## 2024-02-24 RX ADMIN — LATANOPROST 1 DROP: 50 SOLUTION OPHTHALMIC at 20:10

## 2024-02-24 RX ADMIN — INSULIN LISPRO 2 UNITS: 100 INJECTION, SOLUTION INTRAVENOUS; SUBCUTANEOUS at 20:11

## 2024-02-24 RX ADMIN — INSULIN LISPRO 1 UNITS: 100 INJECTION, SOLUTION INTRAVENOUS; SUBCUTANEOUS at 08:34

## 2024-02-24 RX ADMIN — APIXABAN 5 MG: 5 TABLET, FILM COATED ORAL at 20:10

## 2024-02-24 RX ADMIN — INSULIN GLARGINE 15 UNITS: 100 INJECTION, SOLUTION SUBCUTANEOUS at 20:11

## 2024-02-24 RX ADMIN — CARVEDILOL 12.5 MG: 12.5 TABLET, FILM COATED ORAL at 16:49

## 2024-02-24 RX ADMIN — HYDRALAZINE HYDROCHLORIDE 10 MG: 20 INJECTION INTRAMUSCULAR; INTRAVENOUS at 02:15

## 2024-02-24 RX ADMIN — INSULIN LISPRO 1 UNITS: 100 INJECTION, SOLUTION INTRAVENOUS; SUBCUTANEOUS at 12:29

## 2024-02-24 RX ADMIN — IPRATROPIUM BROMIDE AND ALBUTEROL SULFATE 1 DOSE: 2.5; .5 SOLUTION RESPIRATORY (INHALATION) at 08:10

## 2024-02-24 RX ADMIN — PREDNISOLONE ACETATE 1 DROP: 10 SUSPENSION/ DROPS OPHTHALMIC at 20:10

## 2024-02-24 ASSESSMENT — PAIN SCALES - GENERAL: PAINLEVEL_OUTOF10: 0

## 2024-02-24 NOTE — PROGRESS NOTES
lb), 122.4 % IBW. Weight Source: Bed Scale  Current BMI (kg/m2): 25.5        Weight Adjustment For: No Adjustment                 BMI Categories: Overweight (BMI 25.0-29.9)    Estimated Daily Nutrient Needs:  Energy Requirements Based On: Kcal/kg  Weight Used for Energy Requirements: Current  Energy (kcal/day): 0583-1837 kcals per day using 25-30 g/kg of actual body weight  Weight Used for Protein Requirements: Ideal  Protein (g/day): 80-85 grams per day using 1.4-1.5 g/kg of ideal body weight  Method Used for Fluid Requirements: 1 ml/kcal  Fluid (ml/day): 6778-5459 ml per day using 1ml/kcal    Nutrition Diagnosis:   Inadequate oral intake related to impaired respiratory function as evidenced by NPO or clear liquid status due to medical condition    Nutrition Interventions:   Food and/or Nutrient Delivery: Start Tube Feeding, Continue NPO  Nutrition Education/Counseling: Education not indicated  Coordination of Nutrition Care: Continue to monitor while inpatient  Plan of Care discussed with: ICU huddle    Goals:  Previous Goal Met: No Progress toward Goal(s)  Goals: PO intake 75% or greater       Nutrition Monitoring and Evaluation:   Behavioral-Environmental Outcomes: None Identified  Food/Nutrient Intake Outcomes: Diet Advancement/Tolerance  Physical Signs/Symptoms Outcomes: Chewing or Swallowing, Fluid Status or Edema, Skin, Weight    Discharge Planning:    Too soon to determine     Ish Dobbs RD  Contact: [phone]

## 2024-02-24 NOTE — PROGRESS NOTES
End Of Shift Note  [facility]  Summary of shift: blood pressure still on the high side, PRN hydralazine 10 mg given 2x today.  Patient is more awake and interactive today.  Calm and cooperative. Blood sugar covered.  Tolerating tube feed.  No episode of vomiting noted today.  No urine output today.    Vitals:    Vitals:    02/24/24 1500 02/24/24 1600 02/24/24 1629 02/24/24 1649   BP: (!) 159/66 (!) 175/74 (!) 175/74 (!) 155/58   Pulse: 70 73  70   Resp: 12 20     Temp:  97.9 °F (36.6 °C)     TempSrc:  Oral     SpO2: 98% 98%     Weight:       Height:            I&O:   Intake/Output Summary (Last 24 hours) at 2/24/2024 1757  Last data filed at 2/24/2024 1645  Gross per 24 hour   Intake 479.81 ml   Output 5 ml   Net 474.81 ml       Resp Status: Remains on @L of oxygen via nasal cannula,  no episode of SOB noted.      Ventilator Settings:     / / /FiO2 : 40 %    Critical Care IV infusions:   dextrose      sodium chloride Stopped (02/23/24 1610)        LDA:   Peripheral IV 02/21/24 Distal;Left;Anterior Forearm (Active)   Number of days: 3       NG/OG/NJ/NE Tube Nasogastric 16 fr Right nostril (Active)   Number of days: 1       Tunneled Hemodialysis Catheter Left Thigh (Active)   Number of days:

## 2024-02-24 NOTE — PLAN OF CARE
Problem: Chronic Conditions and Co-morbidities  Goal: Patient's chronic conditions and co-morbidity symptoms are monitored and maintained or improved  Outcome: Progressing     Problem: Discharge Planning  Goal: Discharge to home or other facility with appropriate resources  Outcome: Progressing     Problem: Respiratory - Adult  Goal: Achieves optimal ventilation and oxygenation  2/24/2024 1617 by Afsaneh Guerra RN  Outcome: Progressing  2/24/2024 0814 by Vinay Rebolledo RCP  Outcome: Progressing  Goal: Able to breathe comfortably  2/24/2024 0814 by Vinay Rebolledo RCP  Outcome: Progressing     Problem: Safety - Adult  Goal: Free from fall injury  Outcome: Progressing  Flowsheets  Taken 2/24/2024 1617  Free From Fall Injury: Instruct family/caregiver on patient safety  Taken 2/24/2024 0750  Free From Fall Injury: Instruct family/caregiver on patient safety     Problem: Nutrition Deficit:  Goal: Optimize nutritional status  Outcome: Progressing     Problem: Skin/Tissue Integrity  Goal: Absence of new skin breakdown  Description: 1.  Monitor for areas of redness and/or skin breakdown  2.  Assess vascular access sites hourly  3.  Every 4-6 hours minimum:  Change oxygen saturation probe site  4.  Every 4-6 hours:  If on nasal continuous positive airway pressure, respiratory therapy assess nares and determine need for appliance change or resting period.  Outcome: Progressing     Problem: ABCDS Injury Assessment  Goal: Absence of physical injury  Outcome: Progressing  Flowsheets (Taken 2/24/2024 0750)  Absence of Physical Injury: Implement safety measures based on patient assessment

## 2024-02-24 NOTE — RT PROTOCOL NOTE
breaths per minute or more, then use the equivalent nebulizer order(s) with same Frequency and PRN reasons based on the score.  If a patient is on this medication at home then do not decrease Frequency below that used at home.    0-3 - enter or revise RT bronchodilator order(s) to equivalent RT Bronchodilator order with Frequency of every 4 hours PRN for wheezing or increased work of breathing using Per Protocol order mode.        4-6 - enter or revise RT Bronchodilator order(s) to two equivalent RT bronchodilator orders with one order with BID Frequency and one order with Frequency of every 4 hours PRN wheezing or increased work of breathing using Per Protocol order mode.        7-10 - enter or revise RT Bronchodilator order(s) to two equivalent RT bronchodilator orders with one order with TID Frequency and one order with Frequency of every 4 hours PRN wheezing or increased work of breathing using Per Protocol order mode.       11-13 - enter or revise RT Bronchodilator order(s) to one equivalent RT bronchodilator order with QID Frequency and an Albuterol order with Frequency of every 4 hours PRN wheezing or increased work of breathing using Per Protocol order mode.      Greater than 13 - enter or revise RT Bronchodilator order(s) to one equivalent RT bronchodilator order with every 4 hours Frequency and an Albuterol order with Frequency of every 2 hours PRN wheezing or increased work of breathing using Per Protocol order mode.     RT to enter RT Home Evaluation for COPD & MDI Assessment order using Per Protocol order mode.    Electronically signed by Vinay Rebolledo RCP on 2/24/2024 at 8:14 AM

## 2024-02-24 NOTE — PROGRESS NOTES
[facility]  Office: [phone]  Blue Warner DO, Jalil Medina, DO, Maurice Webb DO, Vaibhav Hilliard, DO, Preston Christensen MD, Kimmy Mckinney MD, Rosita Eaton MD, Madina Pan MD,  Rohan Espinoza MD, Geraldine Hopper MD, Talia Hernandez MD,  Rom Musa DO, Gil Treadwell MD, Chandu Serna MD, Davey Warner DO, Liz Mathew MD,  Michael Celaya DO, Milly Schwarz MD, Jaclyn Cummings MD, Swapna Ferguson MD, Amado Dove MD,  Marvin Kaufman MD, Claribel Ochoa MD, Bella Cox MD, Alvino Blas MD, Adolfo Donnelly MD, Cassie Alvarado MD, Ezio Barrios DO, Gilberto Mendieta DO, Yeimy Martin MD,  Jesu Damon MD, Shirley Waterhouse, CNP,  Елена Inman, CNP, Devin Sepulveda, CNP,  Karyn Urena, DNP, Tracy Guerra, CNP, Marilin Wiggins, CNP, Tasha Gonzales CNP, Donna Macario, CNP, Lauren Vail, CNP, Danette Guadarrama, PA-C, Maya Bond, PA-C, Violette Doty, CNP, Danielle Luna, CNP, Margarita Valdivia, CNP, Aurora Benites, CNS, Lelia Wilson, CNP, Candy Williamson, CNP, Tracy Schwab, CNP         [facility]   IN-PATIENT SERVICE   [facility]    Progress Note    2/24/2024    9:58 AM    Name:   Allegra Meza  MRN:     [id number]     Acct:      [id number]   Room:   1104/1104-01   Day:  3  Admit Date:  2/21/2024  9:30 PM    PCP:   Solomon Woodard MD  Code Status:  DNR-CCA    Subjective:     C/C: Shortness of breath    Interval History Status: not changed.     Patient is much more alert today, denies any chest pain, nausea or vomiting, fevers or chills.  Reports some shortness of breath but appears comfortable.  No acute issues per staff    Brief History:     Per my MILAGRO:  \"Allegra Meza is a 73 y.o. Non- / non  female who presents with No chief complaint on file.   and is admitted to the hospital for the management of Community acquired pneumonia of left lower lobe of lung.     Patient is very [place] and oriented to self only. She was transferred from [place]  tenderness in the calves  Skin:  no gross lesions, rashes, induration    Assessment:        Hospital Problems             Last Modified POA    * (Principal) Multifocal pneumonia 2/22/2024 Yes    Presence of cardiac pacemaker 2/22/2024 Yes    Hypertension 2/22/2024 Yes    End stage renal failure on dialysis (HCC) 2/22/2024 Yes    Diabetes mellitus with peripheral vascular disease (HCC) 2/22/2024 Yes    Overview Signed 2/22/2024  1:19 AM by Tracy Guerra APRN - NP     Formatting of this note might be different from the original.   Added automatically from request for surgery [id number]         Congestive heart failure (HCC) 2/22/2024 Yes    Acute exacerbation of chronic obstructive pulmonary disease (HCC) 2/22/2024 Yes    Influenza A 2/22/2024 Yes    Disorientation 2/22/2024 Yes       Plan:        Dialysis per nephrology  Continue NG tube for feeding  Monitor and control blood pressure  Insulin scale for glycemic control  Cardiac medications as ordered  Continue Solu-Medrol  Rocephin as ordered    Maurice Webb DO  2/24/2024  9:58 AM

## 2024-02-24 NOTE — PROGRESS NOTES
Pulmonary Critical Care Progress Note    Patient seen for the follow up of Multifocal pneumonia     Subjective:    She had NG placed by IR.  She is back on oxygen at 2 L nasal cannula.  She is tolerating tube feeds at 30 miles an hour.  She is more awake oriented.   .  Speech will not evaluate until after dialysis and they are not available in the afternoon.      Examination:    Vitals: BP (!) 177/68   Pulse 70   Temp 98.2 °F (36.8 °C) (Oral)   Resp 15   Ht 1.651 m (5' 5\")   Wt 64.3 kg (141 lb 12.1 oz)   SpO2 99%   BMI 23.59 kg/m²   SpO2  Av.6 %  Min: 92 %  Max: 99 %  General appearance: alert and cooperative with exam  Neck: No JVD  Lungs: Decreased breath sound no crackles or wheezing  Heart: regular rate and rhythm, S1, S2 normal, no gallop  Abdomen: Soft, non tender, + BS  Extremities: no cyanosis or clubbing. No significant edema    LABs:    CBC:   Recent Labs     24  0118 24  0344   WBC 7.1 2.0*   HGB 12.4 11.6*   HCT 40.5 37.5    147       BMP:   Recent Labs     24  0344 24  0340    131*   K 5.2 4.6   CO2 20 20   BUN 35* 31*   CREATININE 5.9* 3.9*   LABGLOM 7* 12*   GLUCOSE 320* 203*         LIVER PROFILE:  Recent Labs     24  1232   AST 12   ALT 7   LABALBU 3.7        Latest Reference Range & Units 24 12:32 24 19:23 24 03:44   Pro-BNP <300 pg/mL >70,000 (H)     Troponin, High Sensitivity 0 - 14 ng/L 214 (HH) 185 (HH) 166 (HH)      Latest Reference Range & Units 24 12:32   Albumin 3.5 - 5.2 g/dL 3.7   Alk Phos 35 - 104 U/L 139 (H)   ALT 5 - 33 U/L 7   AST <32 U/L 12   BILIRUBIN TOTAL 0.3 - 1.2 mg/dL 0.3   Bilirubin, Direct <0.3 mg/dL 0.1   Bilirubin, Indirect 0.0 - 1.0 mg/dL 0.2   Total Protein 6.4 - 8.3 g/dL 7.2   (H): Data is abnormally high    MRSA swab negative      Radiology:  Chest CT   Mildly improved aeration left lung base, likely improving atelectasis. Left  perihilar/left basilar airspace disease remains.      Chest

## 2024-02-24 NOTE — PLAN OF CARE
Problem: Respiratory - Adult  Goal: Achieves optimal ventilation and oxygenation  2/24/2024 0814 by Vinay Rebolledo RCP  Outcome: Progressing     Problem: Respiratory - Adult  Goal: Able to breathe comfortably  2/24/2024 0814 by Vinay Rebolledo, CESARP  Outcome: Progressing

## 2024-02-24 NOTE — PLAN OF CARE
Problem: Chronic Conditions and Co-morbidities  Goal: Patient's chronic conditions and co-morbidity symptoms are monitored and maintained or improved  2/24/2024 0013 by Mei Damon RN  Outcome: Progressing  Flowsheets (Taken 2/23/2024 2000)  Care Plan - Patient's Chronic Conditions and Co-Morbidity Symptoms are Monitored and Maintained or Improved:   Monitor and assess patient's chronic conditions and comorbid symptoms for stability, deterioration, or improvement   Collaborate with multidisciplinary team to address chronic and comorbid conditions and prevent exacerbation or deterioration   Update acute care plan with appropriate goals if chronic or comorbid symptoms are exacerbated and prevent overall improvement and discharge  2/23/2024 1341 by Varsha Espino RN  Outcome: Progressing     Problem: Discharge Planning  Goal: Discharge to home or other facility with appropriate resources  2/24/2024 0013 by Mei Damon RN  Outcome: Progressing  Flowsheets (Taken 2/23/2024 2000)  Discharge to home or other facility with appropriate resources:   Identify barriers to discharge with patient and caregiver   Arrange for needed discharge resources and transportation as appropriate   Identify discharge learning needs (meds, wound care, etc)  2/23/2024 1341 by Varsha Espino RN  Outcome: Progressing     Problem: Respiratory - Adult  Goal: Achieves optimal ventilation and oxygenation  2/24/2024 0013 by Mei Damon RN  Outcome: Progressing  Flowsheets (Taken 2/23/2024 2000)  Achieves optimal ventilation and oxygenation:   Assess for changes in respiratory status   Assess for changes in mentation and behavior  2/23/2024 1923 by Ashley Espinoza RCP  Outcome: Progressing  2/23/2024 1341 by Varsha Espino RN  Outcome: Progressing  Goal: Able to breathe comfortably  2/23/2024 1923 by Ashley Espinoza RCP  Outcome: Progressing     Problem: Safety - Adult  Goal: Free from fall injury  2/24/2024 0013 by

## 2024-02-24 NOTE — PROGRESS NOTES
Occupational Therapy  Facility/Department: [facility] ICU  Daily Treatment Note  NAME: Allegra Meza  : 1950  MRN: [id number]    Date of Service: 2024    Discharge Recommendations:  Patient would benefit from continued therapy after discharge   Pt currently functioning below baseline.  Recommend daily inpatient skilled therapy at time of discharge to maximize long term outcomes and prevent re-admission. Please refer to AM-PAC score for current level of function.    Patient Diagnosis(es): There were no encounter diagnoses.     Assessment    Assessment: Pt with noted improvement in bed mobility this date. Continues to require the assist of staff fo bed mobility, transfers, and functional mobility. Decreased cognition and safety awareness presents concern for discharge to environment without 24 hour assist. Pt would benefit from continued skilled OT services to address deficits in areas of functional balance, functional reach, ADL completion, safety awareness, ADL transfers, bed mobility, UE strength, and functional mobility, all limiting safe return home to [facility] and decrease caregiver burden.     Activity Tolerance: Treatment limited secondary to decreased cognition  Discharge Recommendations: Patient would benefit from continued therapy after discharge      Plan   Occupational Therapy Plan  Times Per Week: 4-5x/week 1x/day as tolerated  Current Treatment Recommendations: Strengthening;Balance training;Functional mobility training;Endurance training;Patient/Caregiver education & training;Safety education & training;Cognitive reorientation;Neuromuscular re-education;Equipment evaluation, education, & procurement;Co-Treatment;Coordination training;Positioning;Self-Care / ADL;Cognitive/Perceptual training       Subjective   Subjective  Subjective: Pt with flat affect. Expressing discomfort with NG tube.  Orientation  Overall Orientation Status: Impaired  Cognition  Overall Cognitive Status:  Raw Score: 11  AM-PAC Inpatient ADL T-Scale Score : 29.04  ADL Inpatient CMS 0-100% Score: 70.42  ADL Inpatient CMS G-Code Modifier : CL    Therapy Time   Individual Concurrent Group Co-treatment   Time In 1000         Time Out 1015         Minutes 15          RN reports patient is medically stable for therapy treatment this date.    Chart reviewed prior to treatment and patient is agreeable for therapy.  All lines intact and patient positioned comfortably at end of treatment.  All patient needs addressed prior to ending therapy session.           AMILCAR Capellan       Upon Co-Signature of this note, appropriate supervision of AMILCAR has been delivered with regards to plan of care, evaluation/re-evaluation findings, any appropriate modifications to treatment plan, and relevant discharge planning. Instructions and training unique to this patient and this practitioner have been considered and implemented.

## 2024-02-24 NOTE — RT PROTOCOL NOTE
RT Inhaler-Nebulizer Bronchodilator Protocol Note    There is a bronchodilator order in the chart from a provider indicating to follow the RT Bronchodilator Protocol and there is an “Initiate RT Inhaler-Nebulizer Bronchodilator Protocol” order as well (see protocol at bottom of note).    CXR Findings:  XR CHEST PORTABLE    Result Date: 2/22/2024  1. Persistent opacification of the left lung base in keeping with underlying effusion, atelectasis or pneumonia. 2. Improvement in the aeration of the right lung.     XR CHEST PORTABLE    Result Date: 2/22/2024  Cardiomegaly with diffuse bilateral interstitial and airspace opacities, which may represent pulmonary edema and/or pneumonia.  Possible left pleural effusion.       The findings from the last RT Protocol Assessment were as follows:   History Pulmonary Disease: Chronic pulmonary disease  Respiratory Pattern: Dyspnea on exertion or RR 21-25 bpm  Breath Sounds: Slightly diminished and/or crackles  Cough: Strong, spontaneous, non-productive  Indication for Bronchodilator Therapy: Decreased or absent breath sounds  Bronchodilator Assessment Score: 6    Aerosolized bronchodilator medication orders have been revised according to the RT Inhaler-Nebulizer Bronchodilator Protocol below.    Respiratory Therapist to perform RT Therapy Protocol Assessment initially then follow the protocol.  Repeat RT Therapy Protocol Assessment PRN for score 0-3 or on second treatment, BID, and PRN for scores above 3.    No Indications - adjust the frequency to every 6 hours PRN wheezing or bronchospasm, if no treatments needed after 48 hours then discontinue using Per Protocol order mode.     If indication present, adjust the RT bronchodilator orders based on the Bronchodilator Assessment Score as indicated below.  Use Inhaler orders unless patient has one or more of the following: on home nebulizer, not able to hold breath for 10 seconds, is not alert and oriented, cannot activate and use  MDI correctly, or respiratory rate 25 breaths per minute or more, then use the equivalent nebulizer order(s) with same Frequency and PRN reasons based on the score.  If a patient is on this medication at home then do not decrease Frequency below that used at home.    0-3 - enter or revise RT bronchodilator order(s) to equivalent RT Bronchodilator order with Frequency of every 4 hours PRN for wheezing or increased work of breathing using Per Protocol order mode.        4-6 - enter or revise RT Bronchodilator order(s) to two equivalent RT bronchodilator orders with one order with BID Frequency and one order with Frequency of every 4 hours PRN wheezing or increased work of breathing using Per Protocol order mode.        7-10 - enter or revise RT Bronchodilator order(s) to two equivalent RT bronchodilator orders with one order with TID Frequency and one order with Frequency of every 4 hours PRN wheezing or increased work of breathing using Per Protocol order mode.       11-13 - enter or revise RT Bronchodilator order(s) to one equivalent RT bronchodilator order with QID Frequency and an Albuterol order with Frequency of every 4 hours PRN wheezing or increased work of breathing using Per Protocol order mode.      Greater than 13 - enter or revise RT Bronchodilator order(s) to one equivalent RT bronchodilator order with every 4 hours Frequency and an Albuterol order with Frequency of every 2 hours PRN wheezing or increased work of breathing using Per Protocol order mode.     RT to enter RT Home Evaluation for COPD & MDI Assessment order using Per Protocol order mode.    Electronically signed by Marie Espinoza RCP on 2/23/2024 at 7:24 PM

## 2024-02-24 NOTE — PROGRESS NOTES
Reason for Follow up: ESRD.    Interim History:    Pt seen and examined.  Pt is more awake, alert.   No dyspnea, or nausea / vomiting  Labs meds reviewed  HD was done yesterday.      Scheduled Meds:   ipratropium 0.5 mg-albuterol 2.5 mg  1 Dose Inhalation BID RT    cefTRIAXone (ROCEPHIN) IV  1,000 mg IntraVENous Q24H    methylPREDNISolone  40 mg IntraVENous Q12H    insulin glargine  15 Units SubCUTAneous Nightly    apixaban  5 mg Oral BID    carvedilol  12.5 mg Oral BID WC    insulin lispro  0-4 Units SubCUTAneous TID WC    insulin lispro  0-4 Units SubCUTAneous Nightly    latanoprost  1 drop Left Eye Nightly    prednisoLONE acetate  1 drop Left Eye TID    sodium chloride flush  5-40 mL IntraVENous 2 times per day   Continuous Infusions:   dextrose      sodium chloride Stopped (02/23/24 1610)     PRN Meds:hydrALAZINE **OR** hydrALAZINE, ALPRAZolam, glucose, dextrose bolus **OR** dextrose bolus, glucagon (rDNA), dextrose, anticoagulant sodium citrate, anticoagulant sodium citrate, sodium chloride flush, sodium chloride, ondansetron **OR** ondansetron, acetaminophen **OR** acetaminophen, albuterol    Allergies   Allergen Reactions    Amlodipine Swelling    Betamethasone Other (See Comments)     States \"passed out\"    Diclofenac-Misoprostol Hives    Penicillins Rash and Hives    Phenylephrine-Guaifenesin Hives    Propranolol Hives    Quetiapine Anxiety     States caused anxiety attacks    Adhesive Tape     Clonidine Derivatives     Cymbalta [Duloxetine Hcl]     Entex T [Pseudoephedrine-Guaifenesin]     Inderal La [Propranolol Hcl]     Mometasone Other (See Comments)    Codeine Anxiety, Headaches and Other (See Comments)     States makes her jittery    Morphine Headaches and Nausea Only     \"makes me sick to my stomach\"    Sulfamethoxazole-Trimethoprim Other (See Comments)     UNKNOWN    Tapentadol Anxiety       Physical Exam:  Blood pressure (!) 177/68, pulse 70, temperature 98.2 °F (36.8 °C), temperature source Oral,

## 2024-02-24 NOTE — PLAN OF CARE
Problem: Respiratory - Adult  Goal: Achieves optimal ventilation and oxygenation  2/23/2024 1923 by Ashley Espinoza RCP  Outcome: Progressing     Problem: Respiratory - Adult  Goal: Able to breathe comfortably  2/23/2024 1923 by Ashley Espinoza, CHANTALE  Outcome: Progressing

## 2024-02-25 LAB
ANION GAP SERPL CALCULATED.3IONS-SCNC: 17 MMOL/L (ref 9–17)
BASOPHILS # BLD: 0 K/UL (ref 0–0.2)
BASOPHILS NFR BLD: 0 %
BUN SERPL-MCNC: 62 MG/DL (ref 8–23)
BUN/CREAT SERPL: 11 (ref 9–20)
CALCIUM SERPL-MCNC: 7.8 MG/DL (ref 8.6–10.4)
CHLORIDE SERPL-SCNC: 92 MMOL/L (ref 98–107)
CO2 SERPL-SCNC: 20 MMOL/L (ref 20–31)
CREAT SERPL-MCNC: 5.5 MG/DL (ref 0.5–0.9)
EOSINOPHIL # BLD: 0 K/UL (ref 0–0.4)
EOSINOPHILS RELATIVE PERCENT: 0 % (ref 1–4)
ERYTHROCYTE [DISTWIDTH] IN BLOOD BY AUTOMATED COUNT: 15.1 % (ref 11.8–14.4)
GFR SERPL CREATININE-BSD FRML MDRD: 8 ML/MIN/1.73M2
GLUCOSE BLD-MCNC: 180 MG/DL (ref 65–105)
GLUCOSE BLD-MCNC: 246 MG/DL (ref 65–105)
GLUCOSE BLD-MCNC: 266 MG/DL (ref 65–105)
GLUCOSE BLD-MCNC: 347 MG/DL (ref 65–105)
GLUCOSE SERPL-MCNC: 339 MG/DL (ref 70–99)
HCT VFR BLD AUTO: 38.1 % (ref 36.3–47.1)
HGB BLD-MCNC: 12.1 G/DL (ref 11.9–15.1)
IMM GRANULOCYTES # BLD AUTO: 0 K/UL (ref 0–0.3)
IMM GRANULOCYTES NFR BLD: 0 %
LYMPHOCYTES NFR BLD: 0.25 K/UL (ref 1–4.8)
LYMPHOCYTES RELATIVE PERCENT: 7 % (ref 24–44)
MCH RBC QN AUTO: 31.8 PG (ref 25.2–33.5)
MCHC RBC AUTO-ENTMCNC: 31.8 G/DL (ref 28.4–34.8)
MCV RBC AUTO: 100 FL (ref 82.6–102.9)
MONOCYTES NFR BLD: 0.22 K/UL (ref 0.2–0.8)
MONOCYTES NFR BLD: 6 % (ref 1–7)
NEUTROPHILS NFR BLD: 87 % (ref 36–66)
NEUTS SEG NFR BLD: 3.13 K/UL (ref 1.8–7.7)
NRBC BLD-RTO: 0 PER 100 WBC
PHOSPHATE SERPL-MCNC: 5.8 MG/DL (ref 2.6–4.5)
PLATELET # BLD AUTO: 167 K/UL (ref 138–453)
PMV BLD AUTO: 9.8 FL (ref 8.1–13.5)
POTASSIUM SERPL-SCNC: 4.3 MMOL/L (ref 3.7–5.3)
RBC # BLD AUTO: 3.81 M/UL (ref 3.95–5.11)
SODIUM SERPL-SCNC: 129 MMOL/L (ref 135–144)
WBC OTHER # BLD: 3.6 K/UL (ref 3.5–11.3)

## 2024-02-25 PROCEDURE — 82947 ASSAY GLUCOSE BLOOD QUANT: CPT

## 2024-02-25 PROCEDURE — 94640 AIRWAY INHALATION TREATMENT: CPT

## 2024-02-25 PROCEDURE — 2060000000 HC ICU INTERMEDIATE R&B

## 2024-02-25 PROCEDURE — 80048 BASIC METABOLIC PNL TOTAL CA: CPT

## 2024-02-25 PROCEDURE — 2580000003 HC RX 258: Performed by: NURSE PRACTITIONER

## 2024-02-25 PROCEDURE — 6370000000 HC RX 637 (ALT 250 FOR IP): Performed by: INTERNAL MEDICINE

## 2024-02-25 PROCEDURE — 6360000002 HC RX W HCPCS: Performed by: NURSE PRACTITIONER

## 2024-02-25 PROCEDURE — 2500000003 HC RX 250 WO HCPCS: Performed by: INTERNAL MEDICINE

## 2024-02-25 PROCEDURE — 6370000000 HC RX 637 (ALT 250 FOR IP): Performed by: NURSE PRACTITIONER

## 2024-02-25 PROCEDURE — 36415 COLL VENOUS BLD VENIPUNCTURE: CPT

## 2024-02-25 PROCEDURE — 99232 SBSQ HOSP IP/OBS MODERATE 35: CPT | Performed by: INTERNAL MEDICINE

## 2024-02-25 PROCEDURE — 6360000002 HC RX W HCPCS: Performed by: INTERNAL MEDICINE

## 2024-02-25 PROCEDURE — 94761 N-INVAS EAR/PLS OXIMETRY MLT: CPT

## 2024-02-25 PROCEDURE — 2580000003 HC RX 258: Performed by: INTERNAL MEDICINE

## 2024-02-25 PROCEDURE — 90935 HEMODIALYSIS ONE EVALUATION: CPT

## 2024-02-25 PROCEDURE — 85025 COMPLETE CBC W/AUTO DIFF WBC: CPT

## 2024-02-25 PROCEDURE — 84100 ASSAY OF PHOSPHORUS: CPT

## 2024-02-25 PROCEDURE — 2700000000 HC OXYGEN THERAPY PER DAY

## 2024-02-25 RX ORDER — PREDNISONE 20 MG/1
40 TABLET ORAL DAILY
Status: DISCONTINUED | OUTPATIENT
Start: 2024-02-26 | End: 2024-02-28 | Stop reason: HOSPADM

## 2024-02-25 RX ORDER — HYDRALAZINE HYDROCHLORIDE 25 MG/1
25 TABLET, FILM COATED ORAL EVERY 8 HOURS SCHEDULED
Status: DISCONTINUED | OUTPATIENT
Start: 2024-02-25 | End: 2024-02-26

## 2024-02-25 RX ORDER — INSULIN LISPRO 100 [IU]/ML
0-16 INJECTION, SOLUTION INTRAVENOUS; SUBCUTANEOUS EVERY 6 HOURS
Status: DISCONTINUED | OUTPATIENT
Start: 2024-02-25 | End: 2024-02-28 | Stop reason: HOSPADM

## 2024-02-25 RX ORDER — HYDRALAZINE HYDROCHLORIDE 25 MG/1
25 TABLET, FILM COATED ORAL EVERY 8 HOURS SCHEDULED
Status: DISCONTINUED | OUTPATIENT
Start: 2024-02-25 | End: 2024-02-25

## 2024-02-25 RX ORDER — INSULIN LISPRO 100 [IU]/ML
0-4 INJECTION, SOLUTION INTRAVENOUS; SUBCUTANEOUS NIGHTLY
Status: DISCONTINUED | OUTPATIENT
Start: 2024-02-25 | End: 2024-02-25 | Stop reason: DRUGHIGH

## 2024-02-25 RX ADMIN — PREDNISOLONE ACETATE 1 DROP: 10 SUSPENSION/ DROPS OPHTHALMIC at 14:36

## 2024-02-25 RX ADMIN — SODIUM CHLORIDE, PRESERVATIVE FREE 10 ML: 5 INJECTION INTRAVENOUS at 08:38

## 2024-02-25 RX ADMIN — INSULIN GLARGINE 15 UNITS: 100 INJECTION, SOLUTION SUBCUTANEOUS at 20:35

## 2024-02-25 RX ADMIN — Medication 3.3 ML: at 14:03

## 2024-02-25 RX ADMIN — HYDRALAZINE HYDROCHLORIDE 10 MG: 20 INJECTION INTRAMUSCULAR; INTRAVENOUS at 11:19

## 2024-02-25 RX ADMIN — ACETAMINOPHEN 650 MG: 325 TABLET ORAL at 14:23

## 2024-02-25 RX ADMIN — CARVEDILOL 12.5 MG: 12.5 TABLET, FILM COATED ORAL at 08:38

## 2024-02-25 RX ADMIN — ONDANSETRON 4 MG: 2 INJECTION INTRAMUSCULAR; INTRAVENOUS at 11:27

## 2024-02-25 RX ADMIN — INSULIN LISPRO 12 UNITS: 100 INJECTION, SOLUTION INTRAVENOUS; SUBCUTANEOUS at 02:25

## 2024-02-25 RX ADMIN — APIXABAN 5 MG: 5 TABLET, FILM COATED ORAL at 08:38

## 2024-02-25 RX ADMIN — HYDRALAZINE HYDROCHLORIDE 25 MG: 25 TABLET, FILM COATED ORAL at 20:35

## 2024-02-25 RX ADMIN — Medication 3.2 ML: at 14:02

## 2024-02-25 RX ADMIN — CARVEDILOL 12.5 MG: 12.5 TABLET, FILM COATED ORAL at 17:01

## 2024-02-25 RX ADMIN — HYDRALAZINE HYDROCHLORIDE 25 MG: 25 TABLET, FILM COATED ORAL at 17:09

## 2024-02-25 RX ADMIN — INSULIN LISPRO 4 UNITS: 100 INJECTION, SOLUTION INTRAVENOUS; SUBCUTANEOUS at 09:02

## 2024-02-25 RX ADMIN — WATER 1000 MG: 1 INJECTION INTRAMUSCULAR; INTRAVENOUS; SUBCUTANEOUS at 05:18

## 2024-02-25 RX ADMIN — APIXABAN 5 MG: 5 TABLET, FILM COATED ORAL at 20:35

## 2024-02-25 RX ADMIN — HYDRALAZINE HYDROCHLORIDE 25 MG: 25 TABLET, FILM COATED ORAL at 03:29

## 2024-02-25 RX ADMIN — LATANOPROST 1 DROP: 50 SOLUTION OPHTHALMIC at 20:36

## 2024-02-25 RX ADMIN — PREDNISOLONE ACETATE 1 DROP: 10 SUSPENSION/ DROPS OPHTHALMIC at 08:38

## 2024-02-25 RX ADMIN — ACETAMINOPHEN 650 MG: 325 TABLET ORAL at 05:26

## 2024-02-25 RX ADMIN — IPRATROPIUM BROMIDE AND ALBUTEROL SULFATE 1 DOSE: 2.5; .5 SOLUTION RESPIRATORY (INHALATION) at 07:53

## 2024-02-25 RX ADMIN — ACETAMINOPHEN 650 MG: 325 TABLET ORAL at 20:35

## 2024-02-25 RX ADMIN — INSULIN LISPRO 8 UNITS: 100 INJECTION, SOLUTION INTRAVENOUS; SUBCUTANEOUS at 20:35

## 2024-02-25 RX ADMIN — PREDNISOLONE ACETATE 1 DROP: 10 SUSPENSION/ DROPS OPHTHALMIC at 20:36

## 2024-02-25 RX ADMIN — SODIUM CHLORIDE, PRESERVATIVE FREE 10 ML: 5 INJECTION INTRAVENOUS at 20:35

## 2024-02-25 RX ADMIN — IPRATROPIUM BROMIDE AND ALBUTEROL SULFATE 1 DOSE: 2.5; .5 SOLUTION RESPIRATORY (INHALATION) at 19:26

## 2024-02-25 RX ADMIN — WATER 40 MG: 1 INJECTION INTRAMUSCULAR; INTRAVENOUS; SUBCUTANEOUS at 05:18

## 2024-02-25 ASSESSMENT — PAIN SCALES - GENERAL
PAINLEVEL_OUTOF10: 0
PAINLEVEL_OUTOF10: 0
PAINLEVEL_OUTOF10: 8
PAINLEVEL_OUTOF10: 7

## 2024-02-25 ASSESSMENT — PAIN DESCRIPTION - ORIENTATION: ORIENTATION: LEFT

## 2024-02-25 ASSESSMENT — PAIN DESCRIPTION - LOCATION
LOCATION: EAR
LOCATION: THROAT

## 2024-02-25 ASSESSMENT — PAIN DESCRIPTION - DESCRIPTORS: DESCRIPTORS: ACHING

## 2024-02-25 NOTE — PLAN OF CARE
Problem: Respiratory - Adult  Goal: Achieves optimal ventilation and oxygenation  2/24/2024 1931 by Cristhian Davis RCP  Outcome: Progressing     Problem: Respiratory - Adult  Goal: Able to breathe comfortably  2/24/2024 1931 by Cristhian Davis RCP  Outcome: Progressing

## 2024-02-25 NOTE — PLAN OF CARE
Problem: Chronic Conditions and Co-morbidities  Goal: Patient's chronic conditions and co-morbidity symptoms are monitored and maintained or improved  2/24/2024 2051 by Ana Lilia Snyder RN  Outcome: Progressing     Problem: Discharge Planning  Goal: Discharge to home or other facility with appropriate resources  2/24/2024 2051 by Ana Lilia Snyder RN  Outcome: Progressing     Problem: Respiratory - Adult  Goal: Achieves optimal ventilation and oxygenation  2/25/2024 0753 by Nancy Garces RCP  Outcome: Progressing  2/24/2024 2051 by Ana Lilia Snyder RN  Outcome: Progressing  Goal: Able to breathe comfortably  2/25/2024 0753 by Nancy Garces RCP  Outcome: Progressing     Problem: Safety - Adult  Goal: Free from fall injury  2/24/2024 2051 by Ana Lilia Snyder RN  Outcome: Progressing     Problem: Nutrition Deficit:  Goal: Optimize nutritional status  2/24/2024 2051 by Ana Lilia Snyder RN  Outcome: Progressing     Problem: Skin/Tissue Integrity  Goal: Absence of new skin breakdown  Description: 1.  Monitor for areas of redness and/or skin breakdown  2.  Assess vascular access sites hourly  3.  Every 4-6 hours minimum:  Change oxygen saturation probe site  4.  Every 4-6 hours:  If on nasal continuous positive airway pressure, respiratory therapy assess nares and determine need for appliance change or resting period.  2/24/2024 2051 by Ana Lilia Snyder RN  Outcome: Progressing     Problem: ABCDS Injury Assessment  Goal: Absence of physical injury  2/24/2024 2051 by Ana Lilia Snyder RN  Outcome: Progressing

## 2024-02-25 NOTE — RT PROTOCOL NOTE

## 2024-02-25 NOTE — PLAN OF CARE
Problem: Chronic Conditions and Co-morbidities  Goal: Patient's chronic conditions and co-morbidity symptoms are monitored and maintained or improved  2/24/2024 2051 by Ana Lilia Snyder RN  Outcome: Progressing  2/24/2024 1617 by Afsaneh Guerra RN  Outcome: Progressing     Problem: Discharge Planning  Goal: Discharge to home or other facility with appropriate resources  2/24/2024 2051 by Ana Lilia Snyder RN  Outcome: Progressing  2/24/2024 1617 by Afsaneh Guerra RN  Outcome: Progressing     Problem: Respiratory - Adult  Goal: Achieves optimal ventilation and oxygenation  2/24/2024 2051 by Ana Lilia Snyder RN  Outcome: Progressing  2/24/2024 1931 by Cristhian Davis RCP  Outcome: Progressing  2/24/2024 1617 by Afsaneh Guerra RN  Outcome: Progressing  2/24/2024 0814 by Vinay Rebolledo RCP  Outcome: Progressing  Goal: Able to breathe comfortably  2/24/2024 1931 by Cristhian Davis RCP  Outcome: Progressing  2/24/2024 0814 by Vinay Rebolledo RCP  Outcome: Progressing     Problem: Safety - Adult  Goal: Free from fall injury  2/24/2024 2051 by Ana Lilia Snyder RN  Outcome: Progressing  2/24/2024 1617 by Afsaneh Guerra RN  Outcome: Progressing  Flowsheets  Taken 2/24/2024 1617  Free From Fall Injury: Instruct family/caregiver on patient safety  Taken 2/24/2024 0750  Free From Fall Injury: Instruct family/caregiver on patient safety     Problem: Nutrition Deficit:  Goal: Optimize nutritional status  2/24/2024 2051 by Ana Lilia Snyder RN  Outcome: Progressing  2/24/2024 1617 by Afsaneh Guerra RN  Outcome: Progressing     Problem: Skin/Tissue Integrity  Goal: Absence of new skin breakdown  Description: 1.  Monitor for areas of redness and/or skin breakdown  2.  Assess vascular access sites hourly  3.  Every 4-6 hours minimum:  Change oxygen saturation probe site  4.  Every 4-6 hours:  If on nasal continuous

## 2024-02-25 NOTE — PLAN OF CARE
Problem: Respiratory - Adult  Goal: Achieves optimal ventilation and oxygenation  2/25/2024 0753 by Nancy Garces RCP  Outcome: Progressing     Problem: Respiratory - Adult  Goal: Able to breathe comfortably  2/25/2024 0753 by Nancy Garces RCP  Outcome: Progressing

## 2024-02-25 NOTE — PROGRESS NOTES
Pulmonary Critical Care Progress Note    Patient seen for the follow up of Multifocal pneumonia     Subjective:    She is tolerating NG feeds.  She is back on oxygen at 2 L nasal cannula.  No shortness of breath at rest.  She is more awake oriented.   .  She is undergoing hemodialysis    Examination:    Vitals: BP (!) 146/67   Pulse 70   Temp 99.3 °F (37.4 °C) (Oral)   Resp 13   Ht 1.651 m (5' 5\")   Wt 64 kg (141 lb 1.5 oz)   SpO2 98%   BMI 23.48 kg/m²   SpO2  Av.9 %  Min: 96 %  Max: 99 %  General appearance: alert and cooperative with exam  Neck: No JVD  Lungs: Decreased breath sound no crackles or wheezing  Heart: regular rate and rhythm, S1, S2 normal, no gallop  Abdomen: Soft, non tender, + BS  Extremities: no cyanosis or clubbing. No significant edema    LABs:    CBC:   Recent Labs     24  0344 24  0319   WBC 2.0* 3.6   HGB 11.6* 12.1   HCT 37.5 38.1    167       BMP:   Recent Labs     24  0340 24  0319   * 129*   K 4.6 4.3   CO2 20 20   BUN 31* 62*   CREATININE 3.9* 5.5*   LABGLOM 12* 8*   GLUCOSE 203* 339*         LIVER PROFILE:  No results for input(s): \"AST\", \"ALT\", \"LABALBU\" in the last 72 hours.     Latest Reference Range & Units 24 12:32 24 19:23 24 03:44   Pro-BNP <300 pg/mL >70,000 (H)     Troponin, High Sensitivity 0 - 14 ng/L 214 (HH) 185 (HH) 166 (HH)      Latest Reference Range & Units 24 12:32   Albumin 3.5 - 5.2 g/dL 3.7   Alk Phos 35 - 104 U/L 139 (H)   ALT 5 - 33 U/L 7   AST <32 U/L 12   BILIRUBIN TOTAL 0.3 - 1.2 mg/dL 0.3   Bilirubin, Direct <0.3 mg/dL 0.1   Bilirubin, Indirect 0.0 - 1.0 mg/dL 0.2   Total Protein 6.4 - 8.3 g/dL 7.2   (H): Data is abnormally high    MRSA swab negative      Radiology:  Chest x-ray   Mildly improved aeration left lung base, likely improving atelectasis. Left  perihilar/left basilar airspace disease remains.      Chest x-ray        Impression/recommendations:    Chronic hypoxic  respiratory failure  Oxygen by nasal cannula high flow as needed to saturation above 90%  BiPAP support as needed  Incentive spirometry every hour awake     Pulmonary edema/possible aspiration  Hemodialysis fluid removal per nephrology  Speech swallow evaluation/video swallow tomorrow  NG feeding as tolerated      Influenza A/COPD exacerbation  DuoNeb by nebulizer  Discontinue Solu-Medrol 40 IV every 12 hours  Prednisone 40 mg per NG daily taper  Droplet isolation  Off Tamiflu  Rocephin IV     Change mental status   Monitor MS      Hypertension-diabetes  BP controlled/monitor blood sugar     A-fib/CHF/CAD/PAD   Check troponin BNP  On Eliquis     rheumatoid arthritis  Rheumatology follow-up     Discussed with RN   physical therapy beside  PUD prophylaxis  DVT prophylaxis on Eliquis    Kasi Vickers MD, MD, [facility]  Pulmonary Critical Care and Sleep Medicine,  [facility]  Cell: [phone]  Office: [phone]

## 2024-02-25 NOTE — PROGRESS NOTES
Reason for Follow up: ESRD.    Interim History:    Pt seen and examined at the bedside   Patient is awake and alert   She is pleasantly confused   She has been hypertensive this morning.  Denies any dyspnea   Labs meds reviewed  She has a left femoral tunneled dialysis catheter in place     Scheduled Meds:   insulin lispro  0-16 Units SubCUTAneous Q6H    hydrALAZINE  25 mg Oral 3 times per day    ipratropium 0.5 mg-albuterol 2.5 mg  1 Dose Inhalation BID RT    cefTRIAXone (ROCEPHIN) IV  1,000 mg IntraVENous Q24H    methylPREDNISolone  40 mg IntraVENous Q12H    insulin glargine  15 Units SubCUTAneous Nightly    apixaban  5 mg Oral BID    carvedilol  12.5 mg Oral BID WC    latanoprost  1 drop Left Eye Nightly    prednisoLONE acetate  1 drop Left Eye TID    sodium chloride flush  5-40 mL IntraVENous 2 times per day   Continuous Infusions:   dextrose      sodium chloride Stopped (02/23/24 1610)     PRN Meds:hydrALAZINE **OR** hydrALAZINE, ALPRAZolam, glucose, dextrose bolus **OR** dextrose bolus, glucagon (rDNA), dextrose, anticoagulant sodium citrate, anticoagulant sodium citrate, sodium chloride flush, sodium chloride, ondansetron **OR** ondansetron, acetaminophen **OR** acetaminophen, albuterol    Allergies   Allergen Reactions    Amlodipine Swelling    Betamethasone Other (See Comments)     States \"passed out\"    Diclofenac-Misoprostol Hives    Penicillins Rash and Hives    Phenylephrine-Guaifenesin Hives    Propranolol Hives    Quetiapine Anxiety     States caused anxiety attacks    Adhesive Tape     Clonidine Derivatives     Cymbalta [Duloxetine Hcl]     Entex T [Pseudoephedrine-Guaifenesin]     Inderal La [Propranolol Hcl]     Mometasone Other (See Comments)    Codeine Anxiety, Headaches and Other (See Comments)     States makes her jittery    Morphine Headaches and Nausea Only     \"makes me sick to my stomach\"    Sulfamethoxazole-Trimethoprim Other (See Comments)     UNKNOWN    Tapentadol Anxiety       Physical

## 2024-02-25 NOTE — PROGRESS NOTES
Results   Component Value Date/Time    CULTURE NO GROWTH 3 DAYS 02/22/2024 01:17 AM    CULTURE NO GROWTH 3 DAYS 02/22/2024 01:17 AM       Radiology:  XR CHEST PORTABLE    Result Date: 2/22/2024  1. Persistent opacification of the left lung base in keeping with underlying effusion, atelectasis or pneumonia. 2. Improvement in the aeration of the right lung.     FL MODIFIED BARIUM SWALLOW W VIDEO    Result Date: 2/22/2024  Intermittent laryngeal penetration with at least 1 episode of aspiration during the exam. Please see separate speech pathology report for full discussion of findings and recommendations.     XR CHEST PORTABLE    Result Date: 2/22/2024  Cardiomegaly with diffuse bilateral interstitial and airspace opacities, which may represent pulmonary edema and/or pneumonia.  Possible left pleural effusion.       Physical Examination:        General appearance:  alert, cooperative and no distress  Mental Status:  oriented to person, place and time and normal affect  Lungs:  clear to auscultation bilaterally, normal effort  Heart:  regular rate and rhythm, no murmur  Abdomen:  soft, nontender, nondistended, normal bowel sounds, no masses, hepatomegaly, splenomegaly  Extremities:  no edema, redness, tenderness in the calves  Skin:  no gross lesions, rashes, induration    Assessment:        Hospital Problems             Last Modified POA    * (Principal) Multifocal pneumonia 2/22/2024 Yes    Presence of cardiac pacemaker 2/22/2024 Yes    Hypertension 2/22/2024 Yes    End stage renal failure on dialysis (HCC) 2/22/2024 Yes    Diabetes mellitus with peripheral vascular disease (HCC) 2/22/2024 Yes    Overview Signed 2/22/2024  1:19 AM by Tracy Guerra APRN - NP     Formatting of this note might be different from the original.   Added automatically from request for surgery [id number]         Congestive heart failure (HCC) 2/22/2024 Yes    Acute exacerbation of chronic obstructive pulmonary disease (HCC) 2/22/2024 Yes

## 2024-02-25 NOTE — PROGRESS NOTES
Treatment terminated after 2.5hr.  Pt. SBP < 100, unable to remove anymore fluid.  1.8L removed  Pt. Stable.

## 2024-02-25 NOTE — PROGRESS NOTES
Signed            HEMODIALYSIS PRE-TREATMENT NOTE     Patient Identifiers prior to treatment: yes     Isolation Required: yes        Isolation Type:air     Pt. Does not have covid                 Hepatitis status:                                                                     Date Drawn                             Result  Hepatitis B Surface Antigen 2/05/2024 neg           Hepatitis B Surface Antibody 1/2/2023 neg           Hepatitis B Core Antibody 4/07/2022 neg              How was Hepatitis Status verified: yes      Was a copy of the labs you documented provided to facility for the patient's chart: yes     Hemodialysis orders verified: yes     Access Within normal limits ( I.e. s/s of infection,...): yes     Pre-Assessment completed: yes     Pre-dialysis report received from: Michelle                      Time: 1382

## 2024-02-25 NOTE — PROGRESS NOTES
End Of Shift Note  [facility]  Summary of shift: Pt's BP consistently elevated throughout shift despite 20mg PRN hydralazine and 25mg oral hydralazine. NP aware. Pt A/Ox3 most of shift. Tube feed advanced to goal rate of 42mL/hr, residuals 0-10mL. Q4 30mL water flushes. No BM, anuric. Na 129 this AM. WBC 3.6. PRN tylenol given for L ear ache. POCT checks changed to Q6 per NP d/t TF. BG elevated at 0200 check, changed to high dose sliding scale.     Vitals:    Vitals:    02/25/24 0000 02/25/24 0329 02/25/24 0400 02/25/24 0559   BP: (!) 146/60 (!) 191/77 (!) 183/72    Pulse: 70  70 70   Resp: 11  (!) 6 16   Temp: 98 °F (36.7 °C)  98 °F (36.7 °C)    TempSrc: Temporal  Temporal    SpO2: 98%  98% 98%   Weight:       Height:            I&O:   Intake/Output Summary (Last 24 hours) at 2/25/2024 0622  Last data filed at 2/25/2024 0528  Gross per 24 hour   Intake 974 ml   Output 5 ml   Net 969 ml       Resp Status: 2L NC    Ventilator Settings:     / / /FiO2 : 40 %    Critical Care IV infusions:   dextrose      sodium chloride Stopped (02/23/24 1610)        LDA:   Peripheral IV 02/21/24 Distal;Left;Anterior Forearm (Active)   Number of days: 4       NG/OG/NJ/NE Tube Nasogastric 16 fr Right nostril (Active)   Number of days: 1       Tunneled Hemodialysis Catheter Left Thigh (Active)   Number of days:

## 2024-02-26 ENCOUNTER — APPOINTMENT (OUTPATIENT)
Dept: GENERAL RADIOLOGY | Age: 74
DRG: 193 | End: 2024-02-26
Attending: FAMILY MEDICINE
Payer: MEDICARE

## 2024-02-26 LAB
ANION GAP SERPL CALCULATED.3IONS-SCNC: 15 MMOL/L (ref 9–17)
BASOPHILS # BLD: 0 K/UL (ref 0–0.2)
BASOPHILS NFR BLD: 0 %
BUN SERPL-MCNC: 61 MG/DL (ref 8–23)
BUN/CREAT SERPL: 13 (ref 9–20)
CALCIUM SERPL-MCNC: 8.6 MG/DL (ref 8.6–10.4)
CHLORIDE SERPL-SCNC: 94 MMOL/L (ref 98–107)
CO2 SERPL-SCNC: 23 MMOL/L (ref 20–31)
CREAT SERPL-MCNC: 4.8 MG/DL (ref 0.5–0.9)
EOSINOPHIL # BLD: 0 K/UL (ref 0–0.4)
EOSINOPHILS RELATIVE PERCENT: 0 % (ref 1–4)
ERYTHROCYTE [DISTWIDTH] IN BLOOD BY AUTOMATED COUNT: 14.8 % (ref 11.8–14.4)
GFR SERPL CREATININE-BSD FRML MDRD: 9 ML/MIN/1.73M2
GLUCOSE BLD-MCNC: 163 MG/DL (ref 65–105)
GLUCOSE BLD-MCNC: 229 MG/DL (ref 65–105)
GLUCOSE BLD-MCNC: 311 MG/DL (ref 65–105)
GLUCOSE SERPL-MCNC: 295 MG/DL (ref 70–99)
HBV SURFACE AB SERPL IA-ACNC: 16.13 MIU/ML
HBV SURFACE AG SERPL QL IA: NONREACTIVE
HCT VFR BLD AUTO: 41.2 % (ref 36.3–47.1)
HGB BLD-MCNC: 13.1 G/DL (ref 11.9–15.1)
IMM GRANULOCYTES # BLD AUTO: 0 K/UL (ref 0–0.3)
IMM GRANULOCYTES NFR BLD: 0 %
LYMPHOCYTES NFR BLD: 0.47 K/UL (ref 1–4.8)
LYMPHOCYTES RELATIVE PERCENT: 6 % (ref 24–44)
MCH RBC QN AUTO: 31.3 PG (ref 25.2–33.5)
MCHC RBC AUTO-ENTMCNC: 31.8 G/DL (ref 28.4–34.8)
MCV RBC AUTO: 98.6 FL (ref 82.6–102.9)
MONOCYTES NFR BLD: 0.47 K/UL (ref 0.2–0.8)
MONOCYTES NFR BLD: 6 % (ref 1–7)
NEUTROPHILS NFR BLD: 88 % (ref 36–66)
NEUTS SEG NFR BLD: 6.96 K/UL (ref 1.8–7.7)
NRBC BLD-RTO: 0 PER 100 WBC
PLATELET # BLD AUTO: 156 K/UL (ref 138–453)
PMV BLD AUTO: 10.5 FL (ref 8.1–13.5)
POTASSIUM SERPL-SCNC: 3.8 MMOL/L (ref 3.7–5.3)
RBC # BLD AUTO: 4.18 M/UL (ref 3.95–5.11)
SODIUM SERPL-SCNC: 132 MMOL/L (ref 135–144)
WBC OTHER # BLD: 7.9 K/UL (ref 3.5–11.3)

## 2024-02-26 PROCEDURE — 6370000000 HC RX 637 (ALT 250 FOR IP): Performed by: INTERNAL MEDICINE

## 2024-02-26 PROCEDURE — 2060000000 HC ICU INTERMEDIATE R&B

## 2024-02-26 PROCEDURE — 6370000000 HC RX 637 (ALT 250 FOR IP): Performed by: SPECIALIST

## 2024-02-26 PROCEDURE — 94761 N-INVAS EAR/PLS OXIMETRY MLT: CPT

## 2024-02-26 PROCEDURE — 6370000000 HC RX 637 (ALT 250 FOR IP): Performed by: NURSE PRACTITIONER

## 2024-02-26 PROCEDURE — 6360000002 HC RX W HCPCS: Performed by: NURSE PRACTITIONER

## 2024-02-26 PROCEDURE — P9047 ALBUMIN (HUMAN), 25%, 50ML: HCPCS | Performed by: INTERNAL MEDICINE

## 2024-02-26 PROCEDURE — 6360000002 HC RX W HCPCS: Performed by: INTERNAL MEDICINE

## 2024-02-26 PROCEDURE — 94640 AIRWAY INHALATION TREATMENT: CPT

## 2024-02-26 PROCEDURE — 80048 BASIC METABOLIC PNL TOTAL CA: CPT

## 2024-02-26 PROCEDURE — 86317 IMMUNOASSAY INFECTIOUS AGENT: CPT

## 2024-02-26 PROCEDURE — 2700000000 HC OXYGEN THERAPY PER DAY

## 2024-02-26 PROCEDURE — 2580000003 HC RX 258: Performed by: NURSE PRACTITIONER

## 2024-02-26 PROCEDURE — 99232 SBSQ HOSP IP/OBS MODERATE 35: CPT | Performed by: INTERNAL MEDICINE

## 2024-02-26 PROCEDURE — 74230 X-RAY XM SWLNG FUNCJ C+: CPT

## 2024-02-26 PROCEDURE — 2580000003 HC RX 258: Performed by: INTERNAL MEDICINE

## 2024-02-26 PROCEDURE — 36415 COLL VENOUS BLD VENIPUNCTURE: CPT

## 2024-02-26 PROCEDURE — 2500000003 HC RX 250 WO HCPCS: Performed by: INTERNAL MEDICINE

## 2024-02-26 PROCEDURE — 87340 HEPATITIS B SURFACE AG IA: CPT

## 2024-02-26 PROCEDURE — 82947 ASSAY GLUCOSE BLOOD QUANT: CPT

## 2024-02-26 PROCEDURE — 90935 HEMODIALYSIS ONE EVALUATION: CPT

## 2024-02-26 PROCEDURE — 86704 HEP B CORE ANTIBODY TOTAL: CPT

## 2024-02-26 PROCEDURE — 92611 MOTION FLUOROSCOPY/SWALLOW: CPT

## 2024-02-26 PROCEDURE — 85025 COMPLETE CBC W/AUTO DIFF WBC: CPT

## 2024-02-26 RX ORDER — HYDRALAZINE HYDROCHLORIDE 50 MG/1
50 TABLET, FILM COATED ORAL EVERY 8 HOURS SCHEDULED
Status: DISCONTINUED | OUTPATIENT
Start: 2024-02-26 | End: 2024-02-28

## 2024-02-26 RX ORDER — LABETALOL HYDROCHLORIDE 5 MG/ML
10 INJECTION, SOLUTION INTRAVENOUS ONCE
Status: COMPLETED | OUTPATIENT
Start: 2024-02-26 | End: 2024-02-26

## 2024-02-26 RX ORDER — ALBUMIN (HUMAN) 12.5 G/50ML
25 SOLUTION INTRAVENOUS ONCE
Status: COMPLETED | OUTPATIENT
Start: 2024-02-26 | End: 2024-02-26

## 2024-02-26 RX ADMIN — Medication 3.3 ML: at 17:27

## 2024-02-26 RX ADMIN — INSULIN LISPRO 4 UNITS: 100 INJECTION, SOLUTION INTRAVENOUS; SUBCUTANEOUS at 09:15

## 2024-02-26 RX ADMIN — ALPRAZOLAM 1 MG: 1 TABLET ORAL at 22:01

## 2024-02-26 RX ADMIN — Medication 3.2 ML: at 17:28

## 2024-02-26 RX ADMIN — INSULIN GLARGINE 15 UNITS: 100 INJECTION, SOLUTION SUBCUTANEOUS at 19:55

## 2024-02-26 RX ADMIN — PREDNISOLONE ACETATE 1 DROP: 10 SUSPENSION/ DROPS OPHTHALMIC at 17:37

## 2024-02-26 RX ADMIN — INSULIN LISPRO 8 UNITS: 100 INJECTION, SOLUTION INTRAVENOUS; SUBCUTANEOUS at 19:56

## 2024-02-26 RX ADMIN — CARVEDILOL 12.5 MG: 12.5 TABLET, FILM COATED ORAL at 09:15

## 2024-02-26 RX ADMIN — ACETAMINOPHEN 650 MG: 325 TABLET ORAL at 23:03

## 2024-02-26 RX ADMIN — SODIUM CHLORIDE, PRESERVATIVE FREE 10 ML: 5 INJECTION INTRAVENOUS at 19:56

## 2024-02-26 RX ADMIN — PREDNISOLONE ACETATE 1 DROP: 10 SUSPENSION/ DROPS OPHTHALMIC at 19:56

## 2024-02-26 RX ADMIN — HYDRALAZINE HYDROCHLORIDE 50 MG: 50 TABLET ORAL at 22:01

## 2024-02-26 RX ADMIN — LATANOPROST 1 DROP: 50 SOLUTION OPHTHALMIC at 19:56

## 2024-02-26 RX ADMIN — WATER 1000 MG: 1 INJECTION INTRAMUSCULAR; INTRAVENOUS; SUBCUTANEOUS at 05:39

## 2024-02-26 RX ADMIN — INSULIN LISPRO 12 UNITS: 100 INJECTION, SOLUTION INTRAVENOUS; SUBCUTANEOUS at 02:14

## 2024-02-26 RX ADMIN — IPRATROPIUM BROMIDE AND ALBUTEROL SULFATE 1 DOSE: 2.5; .5 SOLUTION RESPIRATORY (INHALATION) at 19:21

## 2024-02-26 RX ADMIN — SODIUM CHLORIDE, PRESERVATIVE FREE 10 ML: 5 INJECTION INTRAVENOUS at 09:15

## 2024-02-26 RX ADMIN — PREDNISOLONE ACETATE 1 DROP: 10 SUSPENSION/ DROPS OPHTHALMIC at 09:15

## 2024-02-26 RX ADMIN — PREDNISONE 40 MG: 20 TABLET ORAL at 09:15

## 2024-02-26 RX ADMIN — ALBUMIN (HUMAN) 25 G: 0.25 INJECTION, SOLUTION INTRAVENOUS at 16:41

## 2024-02-26 RX ADMIN — LABETALOL HYDROCHLORIDE 10 MG: 5 INJECTION, SOLUTION INTRAVENOUS at 03:44

## 2024-02-26 RX ADMIN — APIXABAN 5 MG: 5 TABLET, FILM COATED ORAL at 09:15

## 2024-02-26 RX ADMIN — APIXABAN 5 MG: 5 TABLET, FILM COATED ORAL at 19:56

## 2024-02-26 RX ADMIN — IPRATROPIUM BROMIDE AND ALBUTEROL SULFATE 1 DOSE: 2.5; .5 SOLUTION RESPIRATORY (INHALATION) at 07:52

## 2024-02-26 RX ADMIN — CARVEDILOL 12.5 MG: 12.5 TABLET, FILM COATED ORAL at 17:36

## 2024-02-26 RX ADMIN — HYDRALAZINE HYDROCHLORIDE 25 MG: 25 TABLET, FILM COATED ORAL at 05:39

## 2024-02-26 RX ADMIN — HYDRALAZINE HYDROCHLORIDE 20 MG: 20 INJECTION INTRAMUSCULAR; INTRAVENOUS at 20:06

## 2024-02-26 RX ADMIN — HYDRALAZINE HYDROCHLORIDE 20 MG: 20 INJECTION INTRAMUSCULAR; INTRAVENOUS at 01:09

## 2024-02-26 ASSESSMENT — PAIN SCALES - GENERAL
PAINLEVEL_OUTOF10: 7
PAINLEVEL_OUTOF10: 0

## 2024-02-26 ASSESSMENT — PAIN DESCRIPTION - DESCRIPTORS: DESCRIPTORS: SORE

## 2024-02-26 ASSESSMENT — PAIN DESCRIPTION - LOCATION: LOCATION: THROAT

## 2024-02-26 NOTE — CARE COORDINATION
Social work:  Spoke to intake/[facility], they are following and anticipate they could accept patient, if appropriate.    Await response from [facility].   In order to go to SNF, patient will need to transfer via wheelchair to/from dialysis; will follow PT/OT evals.   Juan F LEMONS also following.

## 2024-02-26 NOTE — PROGRESS NOTES
HEMODIALYSIS POST TREATMENT NOTE    Treatment time ordered: 210    Actual treatment time: 210    UltraFiltration Goal: 9441-7899  UltraFiltration Removed: 2500      Pre Treatment weight: 67.5  Post Treatment weight: 66.0  Estimated Dry Weight: 66.0 new dry weight    Access used:     Central Venous Catheter:          Tunneled or Non-tunneled: Tunneled           Site: Lt thigh          Access Flow: great        Sign and symptoms of infection: no       If YES: na    Medications or blood products given: none    Chronic outpatient schedule: MWF    Chronic outpatient unit:  Renal care     Summary of response to treatment: Tolerated tx very well with a new dry weight of 66.0kg.    Explain if orders NOT met, was physician notified:edelmira      ACES flowsheet faxed to patient unit/ placed in patient chart: yes    Post assessment completed: yes    Report given to: Yamel       * Intra-treatment documented Safety Checks include the followin) Access and face visible at all times.     2) All connections and blood lines are secure with no kinks.     3) NVL alarm engaged.     4) Hemosafe device applied (if applicable).     5) No collapse of Arterial or Venous blood chambers.     6) All blood lines / pump segments in the air detectors.

## 2024-02-26 NOTE — PROGRESS NOTES
Pulmonary Critical Care Progress Note  Helen Osorio MD     Patient seen for the follow up of chronic hypoxic respiratory failure, pulm edema, influenza A, COPD exacerbation, change in mental status, A-fib    Subjective:  Patient just came back from her swallow study.  She is lying comfortable in the bed.  Her son is present at the bedside.  She is breathing around 14 with oxygen saturation 99% only at 1 L oxygen by nasal cannula.  She is tolerating tube feeds.  He denies any chest pain.  She denies significant cough.    Examination:  Vitals: BP (!) 168/78   Pulse 70   Temp 98.3 °F (36.8 °C) (Oral)   Resp 17   Ht 1.651 m (5' 5\")   Wt 61.4 kg (135 lb 5.8 oz)   SpO2 98%   BMI 22.53 kg/m²   General appearance: alert and cooperative with exam  Neck: No JVD  Lungs: Moderate air exchange, no wheezing  Heart: regular rate and rhythm, S1, S2 normal, no gallop  Abdomen: Soft, non tender, + BS  Extremities: no cyanosis or clubbing.  Trace edema    LABs:  CBC:   Recent Labs     02/25/24  0319 02/26/24  0323   WBC 3.6 7.9   HGB 12.1 13.1   HCT 38.1 41.2    156     BMP:   Recent Labs     02/24/24  0340 02/25/24  0319 02/26/24  0323   * 129* 132*   K 4.6 4.3 3.8   CO2 20 20 23   BUN 31* 62* 61*   CREATININE 3.9* 5.5* 4.8*   LABGLOM 12* 8* 9*   GLUCOSE 203* 339* 295*     Lab Results   Component Value Date/Time    POCPH 7.447 02/21/2024 11:41 PM    POCPCO2 27.8 02/21/2024 11:41 PM    POCPO2 54.1 02/21/2024 11:41 PM    POCHCO3 19.2 02/21/2024 11:41 PM    DBUI9KFF 89.7 02/21/2024 11:41 PM    FIO2 50.0 02/21/2024 11:41 PM     Radiology:  X-ray chest: 2/24/2024      Impression/recommendations:  Chronic hypoxic respiratory failure  Oxygen by nasal cannula high flow as needed to saturation above 90%, currently at 1 L/min  BiPAP support as needed  Incentive spirometry every hour awake    Pulmonary edema/possible aspiration  Hemodialysis fluid removal per nephrology  Speech swallow evaluation/video swallow just done  NG  feeding as tolerated     Influenza A/COPD exacerbation  DuoNeb by nebulizer  Prednisone 40 mg per NG daily taper  Droplet isolation  Completed Tamiflu  Rocephin IV     Change mental status   Monitor MS, currently doing better    Hypertension-diabetes  BP controlled/monitor blood sugar    A-fib/CHF/CAD/PAD   Check troponin BNP  On Eliquis    rheumatoid arthritis  Rheumatology follow-up as outpatient    Discussed with RN   physical therapy beside  PUD prophylaxis  DVT prophylaxis, on therapeutic anticoagulation on Eliquis  Will follow with you    Helen Osorio MD, [facility]  Pulmonary Critical Care and Sleep Medicine,  [facility]  Office: [phone]

## 2024-02-26 NOTE — PLAN OF CARE
Problem: Respiratory - Adult  Goal: Achieves optimal ventilation and oxygenation  2/25/2024 1927 by Cristhian Davis RCP  Outcome: Progressing     Problem: Respiratory - Adult  Goal: Able to breathe comfortably  2/25/2024 1927 by Cristhian Davis RCP  Outcome: Progressing

## 2024-02-26 NOTE — PROGRESS NOTES
Occupational Therapy  [facility]  Occupational Therapy Not Seen    DATE: 2024    NAME: Allegra Meza  MRN: [id number]   : 1950    Patient not seen this date for Occupational Therapy due to:      [x] Cancel by RN or physician due to: Not medically Appropriate /78     [] Hemodialysis    [] Critical Lab Value Level     [] Blood transfusion in progress    [x] Acute or unstable cardiovascular status   _MAP < 55 or more than >115  _HR < 40 or > 130    [] Acute or unstable pulmonary status   -FiO2 > 60%   _RR < 5 or >40    _O2 sats < 85%    [] Strict Bedrest    [] Off Unit for surgery or procedure    [] Off Unit for testing       [] Pending imaging to R/O fracture    [] Refusal by Patient      [] Other      [] OT being discontinued at this time. Patient independent. No further needs.     [] OT being discontinued at this time as the patient has been transferred to hospice care. No further needs.      Logan Hardin AMILCAR

## 2024-02-26 NOTE — PROGRESS NOTES
Reason for Follow up: ESRD.    Interim History:  Pt seen and examined at the bedside.  Patient is awake and alert.  She is pleasantly confused.  She has been hypertensive this morning.  Denies any dyspnea.  Labs meds reviewed.  She has a left femoral tunneled dialysis catheter in place.    Scheduled Meds:   insulin lispro  0-16 Units SubCUTAneous Q6H    hydrALAZINE  25 mg Oral 3 times per day    predniSONE  40 mg Oral Daily    ipratropium 0.5 mg-albuterol 2.5 mg  1 Dose Inhalation BID RT    cefTRIAXone (ROCEPHIN) IV  1,000 mg IntraVENous Q24H    insulin glargine  15 Units SubCUTAneous Nightly    apixaban  5 mg Oral BID    carvedilol  12.5 mg Oral BID WC    latanoprost  1 drop Left Eye Nightly    prednisoLONE acetate  1 drop Left Eye TID    sodium chloride flush  5-40 mL IntraVENous 2 times per day   Continuous Infusions:   dextrose      sodium chloride Stopped (02/23/24 1610)     PRN Meds:hydrALAZINE **OR** hydrALAZINE, ALPRAZolam, glucose, dextrose bolus **OR** dextrose bolus, glucagon (rDNA), dextrose, anticoagulant sodium citrate, anticoagulant sodium citrate, sodium chloride flush, sodium chloride, ondansetron **OR** ondansetron, acetaminophen **OR** acetaminophen, albuterol    Allergies   Allergen Reactions    Amlodipine Swelling    Betamethasone Other (See Comments)     States \"passed out\"    Diclofenac-Misoprostol Hives    Penicillins Rash and Hives    Phenylephrine-Guaifenesin Hives    Propranolol Hives    Quetiapine Anxiety     States caused anxiety attacks    Adhesive Tape     Clonidine Derivatives     Cymbalta [Duloxetine Hcl]     Entex T [Pseudoephedrine-Guaifenesin]     Inderal La [Propranolol Hcl]     Mometasone Other (See Comments)    Codeine Anxiety, Headaches and Other (See Comments)     States makes her jittery    Morphine Headaches and Nausea Only     \"makes me sick to my stomach\"    Sulfamethoxazole-Trimethoprim Other (See Comments)     UNKNOWN    Tapentadol Anxiety       Physical Exam:  Blood  pressure (!) 168/78, pulse 70, temperature 98.3 °F (36.8 °C), temperature source Oral, resp. rate 17, height 1.651 m (5' 5\"), weight 61.4 kg (135 lb 5.8 oz), SpO2 98 %.  In: 1623 [NG/GT:1123]  Out: 2300   In: 1623   Out: 2300     General:  Awake, alert, not in distress. Appears to be stated age.  HEENT: Atraumatic, normocephalic. Anicteric sclera. Pink and moist oral mucosa.   Neck: No JVD.  Chest: Symmetrical, Bilateral air entry, decreased BS  Cardiovascular: S1S2, no murmur, rub or gallop. Has lower extremity edema.    Abdomen: Soft, non tender to palpation.   Musculoskeletal:  No cyanosis or clubbing.  Integumentary: Pink, warm and dry. Free from rash or lesions. Skin turgor normal.  CNS: Face symmetrical. No tremor.     Data:  CBC:   Lab Results   Component Value Date    WBC 7.9 02/26/2024    HGB 13.1 02/26/2024    HCT 41.2 02/26/2024    MCV 98.6 02/26/2024     02/26/2024     BMP:    Lab Results   Component Value Date     (L) 02/26/2024    K 3.8 02/26/2024    CL 94 (L) 02/26/2024    CO2 23 02/26/2024    BUN 61 (H) 02/26/2024    CREATININE 4.8 (H) 02/26/2024    GLUCOSE 295 (H) 02/26/2024     CMP:   Lab Results   Component Value Date     (L) 02/26/2024    K 3.8 02/26/2024    CL 94 (L) 02/26/2024    CO2 23 02/26/2024    BUN 61 (H) 02/26/2024    CREATININE 4.8 (H) 02/26/2024    GLUCOSE 295 (H) 02/26/2024    CALCIUM 8.6 02/26/2024    PROT 7.2 02/22/2024    LABALBU 3.7 02/22/2024    BILITOT 0.3 02/22/2024    ALKPHOS 139 (H) 02/22/2024    AST 12 02/22/2024    ALT 7 02/22/2024      Hepatic:   Lab Results   Component Value Date    AST 12 02/22/2024    ALT 7 02/22/2024    BILITOT 0.3 02/22/2024    ALKPHOS 139 (H) 02/22/2024     Phosphorus:    Lab Results   Component Value Date    PHOS 5.8 (H) 02/25/2024     Ionized Calcium: No results found for: \"IONCA\"  Magnesium: No results found for: \"MG\"  Albumin:   Lab Results   Component Value Date    LABALBU 3.7 02/22/2024     Last 3 CK, CKMB, Troponin:

## 2024-02-26 NOTE — PROGRESS NOTES
SPIRITUAL CARE DEPARTMENT [facility]  PROGRESS NOTE    Room # 1104/1104-01   Name: Allegra Meza               Reason for visit:  Pall Care    I visited the patient.    Admit Date & Time: 2/21/2024  9:30 PM    Assessment:  Allegra Meza is a 73 y.o. female in the hospital because of pneumonia. Upon entering the room the patient and spouse of the patient were sleeping but was awaken shortly after the writer entered.      Intervention:  I introduced myself and my title as Chaplain Wolf from the spiritual care department. The spouse engaged in a brief conversation and greeted the writer. The patient was alert but seemed weak and was soft spoken. The writer provided words of encouragement and hope and stated he will pray for continued healing, comfort, rest, and inner strength.     Outcome:  The patient was thankful for the visit and continued prayers. The spouse stepped outside of the room and requested for the patient to received sacrament of sick if at possible. The writer stated he will forward the request on to our  who makes daily rounds (request dated for 2/27/2024). The spouse is grateful for the services and assistance.     Plan:  Chaplains will remain available to offer spiritual and emotional support as needed.    Electronically signed by Chaplain Mirza Jr, on 2/26/2024 at 12:20 PM.  Spiritual Care Department  [facility]

## 2024-02-26 NOTE — PROGRESS NOTES
Physical Therapy  DATE: 2024    NAME: Allegra Meza  MRN: [id number]   : 1950    Patient not seen this date for Physical Therapy due to:      [] Cancel by RN or physician due to: Not medically Appropriate /78    [] Hemodialysis    [] Critical Lab Value Level     [] Blood transfusion in progress    [] Acute or unstable cardiovascular status   _MAP < 55 or more than >115  _HR < 40 or > 130    [] Acute or unstable pulmonary status   -FiO2 > 60%   _RR < 5 or >40    _O2 sats < 85%    [] Strict Bedrest    [] Off Unit for surgery or procedure    [] Off Unit for testing       [] Pending imaging to R/O fracture    [] Refusal by Patient      [] Other      [] PT being discontinued at this time. Patient independent. No further needs.     [] PT being discontinued at this time as the patient has been transferred to hospice care. No further needs.      Kaylie Baptiste, PTA

## 2024-02-26 NOTE — PROGRESS NOTES
pneumonia and she was given 750 mg Levaquin. She is a dialysis patient, and decision was made to transfer patient to [facility] for further management of LLL pneumonia. She tested positive for influenza A. Upon arrival to the PCU, patient was found to be sl hypoxic at 88%, tachypneic with increased work of breathing. ABGs showed pCO2 27.8, pO2 54.1, pHCO3 19.2, pH 7.4. She was placed on HFNC and SpO2 continued to stay in the 80s with no change in work of breathing. Another CXR was taken and showed cardiomegaly with diffuse bilateral interstitial and airspace opacities, suggestive of pulmonary edema and or pneumonia. She has a PMH of COPD and wears O2 at home, atrial fib on Eliquis, anxiety, GERD, CHF, and HTN. Pulmonary consult was placed. BP was noted to be very high with SBP in the 190s- patient missed HD treatment today due to illness. \"    Review of Systems:     unobtainable      Medications:     Allergies:    Allergies   Allergen Reactions    Amlodipine Swelling    Betamethasone Other (See Comments)     States \"passed out\"    Diclofenac-Misoprostol Hives    Penicillins Rash and Hives    Phenylephrine-Guaifenesin Hives    Propranolol Hives    Quetiapine Anxiety     States caused anxiety attacks    Adhesive Tape     Clonidine Derivatives     Cymbalta [Duloxetine Hcl]     Entex T [Pseudoephedrine-Guaifenesin]     Inderal La [Propranolol Hcl]     Mometasone Other (See Comments)    Codeine Anxiety, Headaches and Other (See Comments)     States makes her jittery    Morphine Headaches and Nausea Only     \"makes me sick to my stomach\"    Sulfamethoxazole-Trimethoprim Other (See Comments)     UNKNOWN    Tapentadol Anxiety       Current Meds:   Scheduled Meds:    insulin lispro  0-16 Units SubCUTAneous Q6H    hydrALAZINE  25 mg Oral 3 times per day    predniSONE  40 mg Oral Daily    ipratropium 0.5 mg-albuterol 2.5 mg  1 Dose Inhalation BID RT    cefTRIAXone (ROCEPHIN) IV  1,000 mg IntraVENous Q24H    insulin glargine  15  180* 266* 311* 229*       I/O (24Hr):    Intake/Output Summary (Last 24 hours) at 2/26/2024 1142  Last data filed at 2/26/2024 0544  Gross per 24 hour   Intake 1583 ml   Output 2300 ml   Net -717 ml       Labs:  Hematology:  Recent Labs     02/25/24  0319 02/26/24  0323   WBC 3.6 7.9   RBC 3.81* 4.18   HGB 12.1 13.1   HCT 38.1 41.2   .0 98.6   MCH 31.8 31.3   MCHC 31.8 31.8   RDW 15.1* 14.8*    156   MPV 9.8 10.5     Chemistry:  Recent Labs     02/24/24  0340 02/25/24  0319 02/26/24  0323   * 129* 132*   K 4.6 4.3 3.8   CL 93* 92* 94*   CO2 20 20 23   GLUCOSE 203* 339* 295*   BUN 31* 62* 61*   CREATININE 3.9* 5.5* 4.8*   ANIONGAP 18* 17 15   LABGLOM 12* 8* 9*   CALCIUM 8.3* 7.8* 8.6   PHOS 5.3* 5.8*  --      Recent Labs     02/25/24  0200 02/25/24  0858 02/25/24  1447 02/25/24  1951 02/26/24  0205 02/26/24  0707   POCGLU 347* 246* 180* 266* 311* 229*     ABG:  Lab Results   Component Value Date/Time    POCPH 7.447 02/21/2024 11:41 PM    POCPCO2 27.8 02/21/2024 11:41 PM    POCPO2 54.1 02/21/2024 11:41 PM    POCHCO3 19.2 02/21/2024 11:41 PM    NBEA 3.5 02/21/2024 11:41 PM    YLTA2MPX 89.7 02/21/2024 11:41 PM    FIO2 50.0 02/21/2024 11:41 PM     No results found for: \"SPECIAL\"  Lab Results   Component Value Date/Time    CULTURE NO GROWTH 4 DAYS 02/22/2024 01:17 AM    CULTURE NO GROWTH 4 DAYS 02/22/2024 01:17 AM       Radiology:  XR CHEST PORTABLE    Result Date: 2/22/2024  Cardiomegaly with diffuse bilateral interstitial and airspace opacities, which may represent pulmonary edema and/or pneumonia.  Possible left pleural effusion.       Physical Examination:        General appearance:  cooperative and no distress  Mental Status:  oriented to person,  normal affect  Lungs:  clear bilaterally, normal effort  Heart:  regular rate and rhythm, no murmur  Abdomen:  soft, nontender, nondistended, normal bowel sounds, no masses, hepatomegaly, splenomegaly  Extremities:  no edema, redness, tenderness in the

## 2024-02-26 NOTE — PLAN OF CARE
Problem: Respiratory - Adult  Goal: Achieves optimal ventilation and oxygenation  2/26/2024 0752 by Nancy Garces RCP  Outcome: Progressing     Problem: Respiratory - Adult  Goal: Able to breathe comfortably  2/26/2024 0752 by Nancy Garces RCP  Outcome: Progressing

## 2024-02-26 NOTE — PLAN OF CARE
Problem: Chronic Conditions and Co-morbidities  Goal: Patient's chronic conditions and co-morbidity symptoms are monitored and maintained or improved  2/26/2024 0038 by Mei Damon RN  Outcome: Progressing  Flowsheets (Taken 2/25/2024 2000)  Care Plan - Patient's Chronic Conditions and Co-Morbidity Symptoms are Monitored and Maintained or Improved:   Monitor and assess patient's chronic conditions and comorbid symptoms for stability, deterioration, or improvement   Collaborate with multidisciplinary team to address chronic and comorbid conditions and prevent exacerbation or deterioration   Update acute care plan with appropriate goals if chronic or comorbid symptoms are exacerbated and prevent overall improvement and discharge     Problem: Discharge Planning  Goal: Discharge to home or other facility with appropriate resources  2/26/2024 0038 by Mei Damon RN  Outcome: Progressing  Flowsheets (Taken 2/25/2024 2000)  Discharge to home or other facility with appropriate resources:   Identify barriers to discharge with patient and caregiver   Arrange for needed discharge resources and transportation as appropriate   Identify discharge learning needs (meds, wound care, etc)   Arrange for interpreters to assist at discharge as needed   Refer to discharge planning if patient needs post-hospital services based on physician order or complex needs related to functional status, cognitive ability or social support system     Problem: Respiratory - Adult  Goal: Achieves optimal ventilation and oxygenation  2/26/2024 0752 by Nancy Garces RCP  Outcome: Progressing  2/26/2024 0038 by Mei Damon RN  Outcome: Progressing  Flowsheets (Taken 2/25/2024 2000)  Achieves optimal ventilation and oxygenation:   Assess for changes in respiratory status   Assess for changes in mentation and behavior  Goal: Able to breathe comfortably  2/26/2024 0752 by Nancy Garces RCP  Outcome: Progressing     Problem: Safety -  Adult  Goal: Free from fall injury  2/26/2024 0038 by Mei Damon RN  Outcome: Progressing     Problem: Nutrition Deficit:  Goal: Optimize nutritional status  2/26/2024 0038 by Mei Damon RN  Outcome: Progressing     Problem: Skin/Tissue Integrity  Goal: Absence of new skin breakdown  Description: 1.  Monitor for areas of redness and/or skin breakdown  2.  Assess vascular access sites hourly  3.  Every 4-6 hours minimum:  Change oxygen saturation probe site  4.  Every 4-6 hours:  If on nasal continuous positive airway pressure, respiratory therapy assess nares and determine need for appliance change or resting period.  2/26/2024 0038 by Mei Damon, RN  Outcome: Progressing     Problem: ABCDS Injury Assessment  Goal: Absence of physical injury  2/26/2024 0038 by Mei Damon, RN  Outcome: Progressing

## 2024-02-26 NOTE — PROCEDURES
Patient Position: Lateral       Consistencies Administered: All      Dysphagia Outcome Severity Scale: Level 3: Moderate dysphagia- Total assistance, supervision or strategies. Two or more diet consistencies restricted  Penetration-Aspiration Scale (PAS): 8 - Material Enters the airway, passes below the vocal folds, and no effort is made to eject    Recommended Diet:  Solid consistency: Minced and Moist  Liquid consistency: Mildly Thick       Medication administration: PO    Safe Swallow Protocol:     Compensatory Swallowing Strategies : Alternate solids and liquids;Upright as possible for all oral intake;Effortful swallow;Small bites/sips;Eat/Feed slowly;Remain upright for 30-45 minutes after meals;Swallow 2 times per bite/sip;Assist feed      Recommendations/Treatment  Requires SLP Intervention: Yes   Frequency: 3-5x/week     D/C Recommendations: Ongoing speech therapy is recommended during this hospitalization;Ongoing speech therapy is recommended at next level of care         Recommended Exercises:    Therapeutic Interventions: Diet tolerance monitoring;Patient/Family education;Pharyngeal exercises;Laryngeal exercises;Oral care;Oral motor exercises         Education: Images and recommendations were reviewed with pt following this exam.   Patient Education: yes  Patient Education Response: Verbalizes understanding           Goals:    Long Term: To Maximize safety with intake, optimize nutrition/hydration and minimize risk for aspiration.                  Short Term:     Goal 1: Pt will complete OMEX for dysphagia x10-20/session.  Goal 2: Pt will tolerate recommended diet without s/s of aspiration.  Goal 3: Pt will follow safe swallow strategies following education.                    Oral Preparation / Oral Phase   +premature spill  with liquids and solids        Pharyngeal Phase     Puree: No penetration, no aspiration with initial swallow. +trace penetration after swallow from vallecula residue (partially

## 2024-02-26 NOTE — CARE COORDINATION
Discharge planning    Influenza A and pneumonia. HD pt. MWF.  Plan for another swallow study today. DNRCCA. Lives with spouse and son. O2 thru [facility].  Referral to [facility] in [place] and [facility]. Has NG.

## 2024-02-26 NOTE — PLAN OF CARE
Problem: Chronic Conditions and Co-morbidities  Goal: Patient's chronic conditions and co-morbidity symptoms are monitored and maintained or improved  Outcome: Progressing  Flowsheets (Taken 2/25/2024 2000)  Care Plan - Patient's Chronic Conditions and Co-Morbidity Symptoms are Monitored and Maintained or Improved:   Monitor and assess patient's chronic conditions and comorbid symptoms for stability, deterioration, or improvement   Collaborate with multidisciplinary team to address chronic and comorbid conditions and prevent exacerbation or deterioration   Update acute care plan with appropriate goals if chronic or comorbid symptoms are exacerbated and prevent overall improvement and discharge     Problem: Discharge Planning  Goal: Discharge to home or other facility with appropriate resources  Outcome: Progressing  Flowsheets (Taken 2/25/2024 2000)  Discharge to home or other facility with appropriate resources:   Identify barriers to discharge with patient and caregiver   Arrange for needed discharge resources and transportation as appropriate   Identify discharge learning needs (meds, wound care, etc)   Arrange for interpreters to assist at discharge as needed   Refer to discharge planning if patient needs post-hospital services based on physician order or complex needs related to functional status, cognitive ability or social support system     Problem: Respiratory - Adult  Goal: Achieves optimal ventilation and oxygenation  2/26/2024 0038 by Mei Damon, RN  Outcome: Progressing  Flowsheets (Taken 2/25/2024 2000)  Achieves optimal ventilation and oxygenation:   Assess for changes in respiratory status   Assess for changes in mentation and behavior  2/25/2024 1927 by Cristhian Davis RCP  Outcome: Progressing  Goal: Able to breathe comfortably  2/25/2024 1927 by Cristhian Davis RCP  Outcome: Progressing     Problem: Safety - Adult  Goal: Free from fall injury  Outcome: Progressing     Problem:

## 2024-02-27 LAB
BASOPHILS # BLD: <0.03 K/UL (ref 0–0.2)
BASOPHILS NFR BLD: 0 % (ref 0–2)
EOSINOPHIL # BLD: <0.03 K/UL (ref 0–0.44)
EOSINOPHILS RELATIVE PERCENT: 0 % (ref 1–4)
ERYTHROCYTE [DISTWIDTH] IN BLOOD BY AUTOMATED COUNT: 14.6 % (ref 11.8–14.4)
GLUCOSE BLD-MCNC: 182 MG/DL (ref 65–105)
GLUCOSE BLD-MCNC: 216 MG/DL (ref 65–105)
GLUCOSE BLD-MCNC: 267 MG/DL (ref 65–105)
GLUCOSE BLD-MCNC: 276 MG/DL (ref 65–105)
GLUCOSE BLD-MCNC: 408 MG/DL (ref 65–105)
HBV CORE AB SER QL: NONREACTIVE
HCT VFR BLD AUTO: 37.9 % (ref 36.3–47.1)
HGB BLD-MCNC: 12 G/DL (ref 11.9–15.1)
IMM GRANULOCYTES # BLD AUTO: 0.03 K/UL (ref 0–0.3)
IMM GRANULOCYTES NFR BLD: 0 %
LYMPHOCYTES NFR BLD: 0.85 K/UL (ref 1.1–3.7)
LYMPHOCYTES RELATIVE PERCENT: 10 % (ref 24–43)
MCH RBC QN AUTO: 31.2 PG (ref 25.2–33.5)
MCHC RBC AUTO-ENTMCNC: 31.7 G/DL (ref 28.4–34.8)
MCV RBC AUTO: 98.4 FL (ref 82.6–102.9)
MICROORGANISM SPEC CULT: NORMAL
MICROORGANISM SPEC CULT: NORMAL
MONOCYTES NFR BLD: 0.69 K/UL (ref 0.1–1.2)
MONOCYTES NFR BLD: 8 % (ref 3–12)
NEUTROPHILS NFR BLD: 81 % (ref 36–65)
NEUTS SEG NFR BLD: 6.69 K/UL (ref 1.5–8.1)
NRBC BLD-RTO: 0 PER 100 WBC
PLATELET # BLD AUTO: 143 K/UL (ref 138–453)
PMV BLD AUTO: 10.7 FL (ref 8.1–13.5)
RBC # BLD AUTO: 3.85 M/UL (ref 3.95–5.11)
RBC # BLD: ABNORMAL 10*6/UL
SERVICE CMNT-IMP: NORMAL
SERVICE CMNT-IMP: NORMAL
SPECIMEN DESCRIPTION: NORMAL
SPECIMEN DESCRIPTION: NORMAL
WBC OTHER # BLD: 8.3 K/UL (ref 3.5–11.3)

## 2024-02-27 PROCEDURE — 6370000000 HC RX 637 (ALT 250 FOR IP): Performed by: INTERNAL MEDICINE

## 2024-02-27 PROCEDURE — 6360000002 HC RX W HCPCS: Performed by: INTERNAL MEDICINE

## 2024-02-27 PROCEDURE — 97110 THERAPEUTIC EXERCISES: CPT

## 2024-02-27 PROCEDURE — 6370000000 HC RX 637 (ALT 250 FOR IP): Performed by: SPECIALIST

## 2024-02-27 PROCEDURE — 6370000000 HC RX 637 (ALT 250 FOR IP): Performed by: NURSE PRACTITIONER

## 2024-02-27 PROCEDURE — 2580000003 HC RX 258: Performed by: INTERNAL MEDICINE

## 2024-02-27 PROCEDURE — 94640 AIRWAY INHALATION TREATMENT: CPT

## 2024-02-27 PROCEDURE — 85025 COMPLETE CBC W/AUTO DIFF WBC: CPT

## 2024-02-27 PROCEDURE — 97535 SELF CARE MNGMENT TRAINING: CPT

## 2024-02-27 PROCEDURE — 2580000003 HC RX 258: Performed by: NURSE PRACTITIONER

## 2024-02-27 PROCEDURE — 97530 THERAPEUTIC ACTIVITIES: CPT

## 2024-02-27 PROCEDURE — 99232 SBSQ HOSP IP/OBS MODERATE 35: CPT | Performed by: INTERNAL MEDICINE

## 2024-02-27 PROCEDURE — 36415 COLL VENOUS BLD VENIPUNCTURE: CPT

## 2024-02-27 PROCEDURE — 6360000002 HC RX W HCPCS: Performed by: NURSE PRACTITIONER

## 2024-02-27 PROCEDURE — 2060000000 HC ICU INTERMEDIATE R&B

## 2024-02-27 PROCEDURE — 2700000000 HC OXYGEN THERAPY PER DAY

## 2024-02-27 PROCEDURE — 94761 N-INVAS EAR/PLS OXIMETRY MLT: CPT

## 2024-02-27 PROCEDURE — 92526 ORAL FUNCTION THERAPY: CPT

## 2024-02-27 RX ORDER — PREDNISONE 20 MG/1
40 TABLET ORAL DAILY
Qty: 6 TABLET | Refills: 0 | DISCHARGE
Start: 2024-02-28 | End: 2024-03-02

## 2024-02-27 RX ORDER — POLYETHYLENE GLYCOL 3350 17 G/17G
17 POWDER, FOR SOLUTION ORAL DAILY
Status: DISCONTINUED | OUTPATIENT
Start: 2024-02-27 | End: 2024-02-28 | Stop reason: HOSPADM

## 2024-02-27 RX ORDER — HYDRALAZINE HYDROCHLORIDE 50 MG/1
50 TABLET, FILM COATED ORAL EVERY 8 HOURS SCHEDULED
Qty: 90 TABLET | Refills: 3 | DISCHARGE
Start: 2024-02-27 | End: 2024-02-28 | Stop reason: HOSPADM

## 2024-02-27 RX ADMIN — SODIUM CHLORIDE, PRESERVATIVE FREE 10 ML: 5 INJECTION INTRAVENOUS at 21:33

## 2024-02-27 RX ADMIN — APIXABAN 5 MG: 5 TABLET, FILM COATED ORAL at 09:02

## 2024-02-27 RX ADMIN — CARVEDILOL 12.5 MG: 12.5 TABLET, FILM COATED ORAL at 17:43

## 2024-02-27 RX ADMIN — HYDRALAZINE HYDROCHLORIDE 50 MG: 50 TABLET ORAL at 06:06

## 2024-02-27 RX ADMIN — LATANOPROST 1 DROP: 50 SOLUTION OPHTHALMIC at 21:33

## 2024-02-27 RX ADMIN — SODIUM CHLORIDE, PRESERVATIVE FREE 10 ML: 5 INJECTION INTRAVENOUS at 09:43

## 2024-02-27 RX ADMIN — PREDNISONE 40 MG: 20 TABLET ORAL at 09:02

## 2024-02-27 RX ADMIN — IPRATROPIUM BROMIDE AND ALBUTEROL SULFATE 1 DOSE: 2.5; .5 SOLUTION RESPIRATORY (INHALATION) at 19:33

## 2024-02-27 RX ADMIN — IPRATROPIUM BROMIDE AND ALBUTEROL SULFATE 1 DOSE: 2.5; .5 SOLUTION RESPIRATORY (INHALATION) at 08:13

## 2024-02-27 RX ADMIN — HYDRALAZINE HYDROCHLORIDE 50 MG: 50 TABLET ORAL at 13:11

## 2024-02-27 RX ADMIN — HYDRALAZINE HYDROCHLORIDE 50 MG: 50 TABLET ORAL at 21:32

## 2024-02-27 RX ADMIN — ALPRAZOLAM 1 MG: 1 TABLET ORAL at 21:32

## 2024-02-27 RX ADMIN — PREDNISOLONE ACETATE 1 DROP: 10 SUSPENSION/ DROPS OPHTHALMIC at 09:03

## 2024-02-27 RX ADMIN — POLYETHYLENE GLYCOL 3350 17 G: 17 POWDER, FOR SOLUTION ORAL at 14:15

## 2024-02-27 RX ADMIN — ACETAMINOPHEN 650 MG: 325 TABLET ORAL at 06:08

## 2024-02-27 RX ADMIN — INSULIN GLARGINE 15 UNITS: 100 INJECTION, SOLUTION SUBCUTANEOUS at 21:32

## 2024-02-27 RX ADMIN — ACETAMINOPHEN 650 MG: 325 TABLET ORAL at 13:11

## 2024-02-27 RX ADMIN — APIXABAN 5 MG: 5 TABLET, FILM COATED ORAL at 21:32

## 2024-02-27 RX ADMIN — INSULIN LISPRO 8 UNITS: 100 INJECTION, SOLUTION INTRAVENOUS; SUBCUTANEOUS at 21:32

## 2024-02-27 RX ADMIN — CARVEDILOL 12.5 MG: 12.5 TABLET, FILM COATED ORAL at 09:02

## 2024-02-27 RX ADMIN — INSULIN LISPRO 16 UNITS: 100 INJECTION, SOLUTION INTRAVENOUS; SUBCUTANEOUS at 14:15

## 2024-02-27 RX ADMIN — INSULIN LISPRO 4 UNITS: 100 INJECTION, SOLUTION INTRAVENOUS; SUBCUTANEOUS at 01:29

## 2024-02-27 RX ADMIN — WATER 1000 MG: 1 INJECTION INTRAMUSCULAR; INTRAVENOUS; SUBCUTANEOUS at 06:06

## 2024-02-27 RX ADMIN — PREDNISOLONE ACETATE 1 DROP: 10 SUSPENSION/ DROPS OPHTHALMIC at 14:15

## 2024-02-27 RX ADMIN — HYDRALAZINE HYDROCHLORIDE 10 MG: 20 INJECTION INTRAMUSCULAR; INTRAVENOUS at 18:41

## 2024-02-27 RX ADMIN — PHENOL 1 SPRAY: 1.4 SPRAY ORAL at 11:50

## 2024-02-27 RX ADMIN — PREDNISOLONE ACETATE 1 DROP: 10 SUSPENSION/ DROPS OPHTHALMIC at 21:33

## 2024-02-27 ASSESSMENT — PAIN SCALES - GENERAL
PAINLEVEL_OUTOF10: 0
PAINLEVEL_OUTOF10: 7
PAINLEVEL_OUTOF10: 8
PAINLEVEL_OUTOF10: 3
PAINLEVEL_OUTOF10: 7
PAINLEVEL_OUTOF10: 8
PAINLEVEL_OUTOF10: 7
PAINLEVEL_OUTOF10: 0
PAINLEVEL_OUTOF10: 0
PAINLEVEL_OUTOF10: 2
PAINLEVEL_OUTOF10: 3
PAINLEVEL_OUTOF10: 0

## 2024-02-27 ASSESSMENT — PAIN DESCRIPTION - ORIENTATION
ORIENTATION: INNER
ORIENTATION: MID
ORIENTATION: MID
ORIENTATION: INNER
ORIENTATION: MID
ORIENTATION: INNER

## 2024-02-27 ASSESSMENT — PAIN DESCRIPTION - LOCATION
LOCATION: BACK
LOCATION: BACK
LOCATION: THROAT
LOCATION: THROAT
LOCATION: BACK
LOCATION: THROAT

## 2024-02-27 ASSESSMENT — PAIN DESCRIPTION - DESCRIPTORS
DESCRIPTORS: ACHING
DESCRIPTORS: SORE
DESCRIPTORS: SORE
DESCRIPTORS: ACHING
DESCRIPTORS: SORE
DESCRIPTORS: SORE
DESCRIPTORS: ACHING

## 2024-02-27 ASSESSMENT — PAIN DESCRIPTION - PAIN TYPE
TYPE: ACUTE PAIN

## 2024-02-27 NOTE — PLAN OF CARE
Problem: Chronic Conditions and Co-morbidities  Goal: Patient's chronic conditions and co-morbidity symptoms are monitored and maintained or improved  Outcome: Progressing     Problem: Discharge Planning  Goal: Discharge to home or other facility with appropriate resources  Outcome: Progressing     Problem: Respiratory - Adult  Goal: Achieves optimal ventilation and oxygenation  2/27/2024 0507 by Georgiana Francois RN  Outcome: Progressing  2/26/2024 1926 by Ashley Espinoza RCP  Outcome: Progressing  Goal: Able to breathe comfortably  2/26/2024 1926 by Ashley Espinoza RCP  Outcome: Progressing     Problem: Safety - Adult  Goal: Free from fall injury  Outcome: Progressing     Problem: Nutrition Deficit:  Goal: Optimize nutritional status  Outcome: Progressing     Problem: Skin/Tissue Integrity  Goal: Absence of new skin breakdown  Description: 1.  Monitor for areas of redness and/or skin breakdown  2.  Assess vascular access sites hourly  3.  Every 4-6 hours minimum:  Change oxygen saturation probe site  4.  Every 4-6 hours:  If on nasal continuous positive airway pressure, respiratory therapy assess nares and determine need for appliance change or resting period.  Outcome: Progressing     Problem: ABCDS Injury Assessment  Goal: Absence of physical injury  Outcome: Progressing     Problem: Pain  Goal: Verbalizes/displays adequate comfort level or baseline comfort level  Outcome: Progressing

## 2024-02-27 NOTE — PROGRESS NOTES
Speech Language Pathology  [facility]    Dysphagia Treatment Note    Date: 2/27/2024  Patient’s Name: Allegra Meza  MRN: [id number]  Diagnosis: Dysphagia  Patient Active Problem List   Diagnosis Code    Pneumonia due to organism J18.9    Multifocal pneumonia J18.9    Presence of cardiac pacemaker Z95.0    Rheumatoid arthritis ([facility]) M06.9    Peripheral vascular disorder due to diabetes mellitus ([facility]) E11.51    Obstructive sleep apnea syndrome G47.33    Hypertension I10    End stage renal failure on dialysis ([facility]) N18.6, Z99.2    Diabetes mellitus with peripheral vascular disease ([facility]) E11.51    Congestive heart failure ([facility]) I50.9    Arteriosclerosis of coronary artery I25.10    Acute exacerbation of chronic obstructive pulmonary disease ([facility]) J44.1    NSTEMI (non-ST elevated myocardial infarction) ([facility]) I21.4    Pure hypercholesterolemia, unspecified E78.00    Blurred vision H53.8    Influenza A J10.1    Disorientation R41.0       Pain: 0/10    Dysphagia Treatment  Treatment time: 0275-0356    Subjective: [x] Alert [x] Cooperative     [] Confused     [] Agitated    [] Lethargic    Objective/Assessment:    Pt. Completed MBSS on 2/26.  Dysphagia Minced and Moist (Dysphagia II) with Mildly Thick ([place]) was recommended.     Pt. Seen for O/M treatment program for dysphagia.  Pt. Completed O/M exercises X 10 X 1 set with min verbal/visual cues.  Pt. Completed brennan maneuver X 3 reps with min verbal cues. Education provided re: importance of completing exercises daily.  Exercise program left at bedside.    ST will continue to follow.      Plan:  [x] Continue ST services    [] Discharge from ST:        Discharge recommendations: []  Further therapy recommended at discharge.The patient should be able to tolerate at least 3 hours of therapy per day over 5 days or 15 hours over 7 days. [x] Further therapy recommended at discharge.   [] No therapy recommended at discharge.         Treatment completed  by: CHAPARRO MEDINA, SLP, M.A. [facility]

## 2024-02-27 NOTE — PROGRESS NOTES
Patient receives Sacrament of the Sick (anointing) from Austen wong.    Spiritual Care will follow as needed.  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

## 2024-02-27 NOTE — PLAN OF CARE
Problem: Chronic Conditions and Co-morbidities  Goal: Patient's chronic conditions and co-morbidity symptoms are monitored and maintained or improved  2/27/2024 1833 by Danette Steward RN  Outcome: Progressing     Problem: Respiratory - Adult  Goal: Achieves optimal ventilation and oxygenation  2/27/2024 1833 by Danette Steward RN  Outcome: Progressing     Problem: Safety - Adult  Goal: Free from fall injury  2/27/2024 1833 by Danette Steward RN  Outcome: Progressing     Problem: Nutrition Deficit:  Goal: Optimize nutritional status  2/27/2024 1833 by Danette Steward RN  Outcome: Progressing  Flowsheets (Taken 2/27/2024 1626 by Tasha Cintron, RD, LD)  Nutrient intake appropriate for improving, restoring, or maintaining nutritional needs:   Monitor oral intake, labs, and treatment plans   Assess nutritional status and recommend course of action     Problem: Skin/Tissue Integrity  Goal: Absence of new skin breakdown  Description: 1.  Monitor for areas of redness and/or skin breakdown  2.  Assess vascular access sites hourly  3.  Every 4-6 hours minimum:  Change oxygen saturation probe site  4.  Every 4-6 hours:  If on nasal continuous positive airway pressure, respiratory therapy assess nares and determine need for appliance change or resting period.  2/27/2024 1833 by Danette Steward RN  Outcome: Progressing     Problem: ABCDS Injury Assessment  Goal: Absence of physical injury  2/27/2024 1833 by Danette Steward RN  Outcome: Progressing     Problem: Pain  Goal: Verbalizes/displays adequate comfort level or baseline comfort level  2/27/2024 1833 by Danette Steward RN  Outcome: Progressing

## 2024-02-27 NOTE — PROGRESS NOTES
Reason for Follow up: ESRD.    Interim History:  Pt seen and examined at the bedside.  Patient is awake and alert.  She is pleasantly confused.  She has been hypertensive this morning.  Denies any dyspnea.  Labs meds reviewed.  She has a left femoral tunneled dialysis catheter in place.    Scheduled Meds:   hydrALAZINE  50 mg Oral 3 times per day    insulin lispro  0-16 Units SubCUTAneous Q6H    predniSONE  40 mg Oral Daily    ipratropium 0.5 mg-albuterol 2.5 mg  1 Dose Inhalation BID RT    insulin glargine  15 Units SubCUTAneous Nightly    apixaban  5 mg Oral BID    carvedilol  12.5 mg Oral BID WC    latanoprost  1 drop Left Eye Nightly    prednisoLONE acetate  1 drop Left Eye TID    sodium chloride flush  5-40 mL IntraVENous 2 times per day   Continuous Infusions:   dextrose      sodium chloride Stopped (02/23/24 1610)     PRN Meds:phenol, hydrALAZINE **OR** hydrALAZINE, ALPRAZolam, glucose, dextrose bolus **OR** dextrose bolus, glucagon (rDNA), dextrose, anticoagulant sodium citrate, anticoagulant sodium citrate, sodium chloride flush, sodium chloride, ondansetron **OR** ondansetron, acetaminophen **OR** acetaminophen, albuterol    Allergies   Allergen Reactions    Amlodipine Swelling    Betamethasone Other (See Comments)     States \"passed out\"    Diclofenac-Misoprostol Hives    Penicillins Rash and Hives    Phenylephrine-Guaifenesin Hives    Propranolol Hives    Quetiapine Anxiety     States caused anxiety attacks    Adhesive Tape     Clonidine Derivatives     Cymbalta [Duloxetine Hcl]     Entex T [Pseudoephedrine-Guaifenesin]     Inderal La [Propranolol Hcl]     Mometasone Other (See Comments)    Codeine Anxiety, Headaches and Other (See Comments)     States makes her jittery    Morphine Headaches and Nausea Only     \"makes me sick to my stomach\"    Sulfamethoxazole-Trimethoprim Other (See Comments)     UNKNOWN    Tapentadol Anxiety       Physical Exam:  Blood pressure (!) 160/63, pulse 70, temperature 98 °F  results found for: \"NAUR\", \"PROTUR\"    Radiology:   Reviewed.    Assessment:  End stage renal disease.  Hyponatremia (true and pseudohyponatremia). Patient's corrected serum sodium was 137 yesterday.  Mild hyperkalemia, resolved.  Hypertension.  Volume overload.   Pneumonia.  Influenza A.  Constipation.     Plan:  On thickened liquids now.  Will plan hemodialysis with ultrafiltration MWF. Excess fluid removal will help her BP.  On Coreg and Hydralazine. Monitor BP.  Patient's current dialysis access is a left femoral tunneled dialysis catheter.  Will start her on Miralax.   We will follow with you.    Please do not hesitate to call with questions.     Electronically signed by Berlin Jaramillo MD  on 2/27/2024 at 12:24 PM  [facility].  Ph: [phone]

## 2024-02-27 NOTE — PROGRESS NOTES
Occupational Therapy  Facility/Department: [facility]  Occupational Therapy Daily Treatment Note    Name: Allegra Meza  : 1950  MRN: [id number]  Date of Service: 2024    Discharge Recommendations:  Patient would benefit from continued therapy after discharge. Pt currently functioning below baseline.  Recommend daily inpatient skilled therapy at time of discharge to maximize long term outcomes and prevent re-admission. Please refer to AM-PAC score for current level of function.       RN reports patient is medically stable for therapy treatment this date.    Chart reviewed prior to treatment and patient is agreeable for therapy.  All lines intact and patient positioned comfortably at end of treatment.  All patient needs addressed prior to ending therapy session.        Patient Diagnosis(es): There were no encounter diagnoses.  Past Medical History:  has a past medical history of Anxiety, Asthma, Atrial fibrillation ([facility]), CAD (coronary artery disease), CHF (congestive heart failure) ([facility]), COPD (chronic obstructive pulmonary disease) ([facility]), DM (diabetes mellitus), type 2 ([facility]), ESRD on dialysis ([facility]), Fibromyalgia, Fibromyalgia, Hearing loss, Heart disease, Herpes simplex virus (HSV) infection, History of blood transfusion, Hypertension, Kidney failure, LVH (left ventricular hypertrophy), Mitral valve regurgitation, On home O2, Osteoporosis, PAD (peripheral artery disease) ([facility]), and Rheumatoid arthritis ([facility]).  Past Surgical History:  has a past surgical history that includes bladder repair;  section ();  section ();  section (); Hysterectomy; Lithotripsy; joint replacement; Tubal ligation; Tonsillectomy; Tympanoplasty; Coronary angioplasty with stent; pacemaker placement; and AV fistula repair.        Assessment   Performance deficits / Impairments: Decreased functional mobility ;Decreased ADL status;Decreased endurance;Decreased cognition;Decreased  cognitive deficits, decreased seated/standing tolerance, and overall activity tolerance. Pt requires mod X2 assist at this time for safe bed mob, ADL transfers and progressing function mobility as able and assist for initation and sequencing for ADL tasks.     Activity Tolerance  Activity Tolerance: Treatment limited secondary to decreased cognition;Patient limited by endurance;Patient limited by fatigue    Bed mobility  Supine to Sit: Moderate assistance;2 Person assistance  Scooting: Moderate assistance;Maximal assistance;2 Person assistance (to scoot in seated position)  Bed Mobility Comments: mod-max verb and tactile cueing to brings legs off EOB; pt up to recliner.     Balance  Static: Min-modA x1; Verbal and Tact ueing  Dynamic: min-mod x1; Verb and tact cueing   Pt with poor seated balance seated EOB; assist and cueing to correct posture and look ahead, pt able to correct but falls back into \"slouching\" shortly; Pt demo dynamic seated bal with functional reaching seated EOB; min-modA X1 to maintain posture    Transfers  Sit to stand: Moderate assistance;2 Person assistance  Stand to sit: Moderate assistance;2 Person assistance  Transfer Comments: pt demo STS mod x2; ed for pushing off/reaching back for bed instead of holding onto RW. 1st STS trial EOB to RW mod x2, mod assist for coming to complete stand, pt tolerates ~ 20secs stand + 2 side steps before needing to sit d/t fatigue; second trial EOB to RW min-mod x2, tolerated ~10 sec stand before needing to sit but pt states need to have BM. 3rd trial EOB> Sarastedy>commode & 4th trial commode>SaraStedy>recliner with good use of BUE to pull self to stand, pt with poor posture, fair-poor seated balance and fair eccentric control on SS; assist for management of multiple lines/IV pole    Vision  Vision: Impaired (Per patient; blind L eye)  Hearing  Hearing: Exceptions to WFL  Hearing Exceptions: Hard of hearing/hearing concerns;Bilateral hearing

## 2024-02-27 NOTE — PROGRESS NOTES
[facility]  Office: [phone]  Blue Warner DO, Jalil Medina DO, Maurice Webb DO, Vaibhav Hilliard DO, Preston Christensen MD, Kimmy Mckinney MD, Rosita Eaton MD, Madina Pan MD,  Rohan Espinoza MD, Geraldine Hopper MD, Talia Hernandez MD,  Rom Musa DO, Gil Treadwell MD, Chandu Serna MD, Davey Warner DO, Lzi Mathew MD,  Michael Celaya DO, Milly Schwarz MD, Jaclyn Cummings MD, Swapna Ferguson MD, Amado Dove MD,  Marvin Kaufman MD, Claribel Ochoa MD, Bella Cox MD, Alvino Blas MD, Adolfo Donnelly MD, Cassie Alvarado MD, Ezio Barrios DO, Gilberto Mendieta DO, Yeimy Martin MD,  Jesu Damon MD, Shirley Waterhouse, CNP,  Елена Inman, CNP, Devin Sepulveda, CNP,  Karyn Urena, DNP, Tracy Guerra, CNP, Marilin Wiggins, CNP, Tasha Gonzales CNP, Donna Macario, CNP, Lauren Vail, CNP, Danette Guadarrama, PA-C, Maya Bond, PA-C, Violette Doty, CNP, Danielle Luna, CNP, Margarita Valdivia, CNP, Aurora Benites, CNS, Lelia Wilson, CNP, Candy Williamson, CNP, Tracy Schwab, CNP         [facility]   IN-PATIENT SERVICE   [facility]    Progress Note    2/27/2024    10:31 AM    Name:   Allegra Meza  MRN:     [id number]     Acct:      [id number]   Room:   [facility]/1104-01   Day:  6  Admit Date:  2/21/2024  9:30 PM    PCP:   Solomon Woodard MD  Code Status:  DNR-CCA    Subjective:     C/C: Shortness of breath    Interval History Status: improved.     Patient is resting comfortably denies any complaints of chest pain, shortness of breath, nausea or vomiting, fevers or chills.  Passed swallow study yesterday with diet of minced and moist    Brief History:     Per my MILAGRO:  \"Allegra Meza is a 73 y.o. Non- / non  female who presents with No chief complaint on file.   and is admitted to the hospital for the management of Community acquired pneumonia of left lower lobe of lung.     Patient is very [place] and oriented to self only. She was transferred from  (HCC) 2/22/2024 Yes    Overview Signed 2/22/2024  1:19 AM by Tracy Guerra APRN - NP     Formatting of this note might be different from the original.   Added automatically from request for surgery [id number]         Congestive heart failure (HCC) 2/22/2024 Yes    Acute exacerbation of chronic obstructive pulmonary disease (HCC) 2/22/2024 Yes    Influenza A 2/22/2024 Yes    Disorientation 2/22/2024 Yes       Plan:        Continue minced and moist diet  Discontinue NG tube, start nutritional supplements  Monitor and control blood pressure  Dialysis per nephrology  Insulin scale  Monitor and control blood pressure  PT and OT  Discharge planning to skilled facility    Maurice Webb DO  2/27/2024  10:31 AM

## 2024-02-27 NOTE — PROGRESS NOTES
End Of Shift Note  [facility]  Summary of shift: Uneventful shift, PRN hydralazine given once and xanax given before bedtime. Patient was able to sleep well over night. Was given tylenol twice for complaints of pain in throat. No BM over night.     Vitals:    Vitals:    02/26/24 2310 02/27/24 0000 02/27/24 0200 02/27/24 0400   BP: (!) 157/67 129/68 (!) 163/66 (!) 128/54   Pulse: 70 70 70 70   Resp: 12 19 19 14   Temp:    98.6 °F (37 °C)   TempSrc:    Oral   SpO2: 93% 96% 93% 94%   Weight:       Height:            I&O:   Intake/Output Summary (Last 24 hours) at 2/27/2024 0537  Last data filed at 2/27/2024 0535  Gross per 24 hour   Intake 2189 ml   Output 2500 ml   Net -311 ml       Resp Status: Room air    Ventilator Settings:     / / /FiO2 : 40 %    Critical Care IV infusions:   dextrose      sodium chloride Stopped (02/23/24 1610)        LDA:   Peripheral IV 02/21/24 Distal;Left;Anterior Forearm (Active)   Number of days: 6       NG/OG/NJ/NE Tube Nasogastric 16 fr Right nostril (Active)   Number of days: 3       Tunneled Hemodialysis Catheter Left Thigh (Active)   Number of days:

## 2024-02-27 NOTE — PROGRESS NOTES
Nutrition Assessment     Type and Reason for Visit: Reassess    Nutrition Recommendations/Plan:   ADULT DIET; Dysphagia - Minced and Moist; 4 carb choices (60 gm/meal); Low Fat/Low Chol/High Fiber/BALDEV; Low Potassium (Less than 3000 mg/day); Low Phosphorus (Less than 1000 mg); Mildly Thick (Nectar); 1500 ml  Start Magic Cup 2x/day  Monitor p.o intakes, diet tolerance and labs     Malnutrition Assessment:  Malnutrition Status: No malnutrition    Nutrition Assessment:  Patient is status post modified barium swallow study done yesterday (2/26). Patient passed swallow study and diet has been advanced to Dysphagia minced and moist; 4 carb choices; low fat/ low chol/ high fiber/ BALDEV; mildly thick. Patient is tolerating diet and ate breakfast this morning. Nasogastric tube was discontinued. Tube feedings are no longer needed and tube feeding order has been discontinued. Patient last received hemodialysis yesterday (2/26). Continue current diet and start Magic Cup 2x/day. Monitor p.o intakes, diet tolerance and labs.    Estimated Daily Nutrient Needs:  Energy (kcal):  3834-2322 kcal (25-28 kcal/kg) Weight Used for Energy Requirements: Current     Protein (g):  80-85 grams per day using 1.4-1.5 g/kg of ideal body weight Weight Used for Protein Requirements: Ideal        Fluid (ml/day):  1500 mL per diet order Method Used for Fluid Requirements: 1 ml/kcal    Nutrition Related Findings:   No edema. Hyperactive bowel sounds. Sore throat. Modified barium swallow study. Hemodialysis. Labs: glucose: 408 (H) Wound Type: None    Current Nutrition Therapies:    ADULT DIET; Dysphagia - Minced and Moist; 4 carb choices (60 gm/meal); Low Fat/Low Chol/High Fiber/BALDEV; Low Potassium (Less than 3000 mg/day); Low Phosphorus (Less than 1000 mg); Mildly Thick (Nectar); 1500 ml  ADULT ORAL NUTRITION SUPPLEMENT; Lunch, Dinner; Frozen Oral Supplement    Anthropometric Measures:  Height: 165.1 cm (5' 5\")  Current Body Wt: 66 kg (145 lb 8.1 oz)

## 2024-02-27 NOTE — CARE COORDINATION
Social work: [facility] in [place] are able to accommodate patient.   Spoke to spouse, preference is [facility] d/t proximity to dialysis.   Spoke to [facility], they can take patient Wednesday after dialysis.     Transport requested for 3:00PM on Wednesday. RN and facility aware.   [facility] called outpatient dialysis unit to inform as well- fax for orders [phone].

## 2024-02-27 NOTE — PROGRESS NOTES
Pulmonary Critical Care Progress Note       Patient seen for the follow up of chronic hypoxic respiratory failure, pulm edema, influenza A, COPD exacerbation, change in mental status, A-fib    Subjective:  She is lying comfortable in the bed. She is on room air with o2 saturation of 94%. NG tube has been removed. She denies any chest pain.  She denies significant cough.    Examination:  Vitals: BP (!) 141/58   Pulse 70   Temp 99.4 °F (37.4 °C) (Oral)   Resp 16   Ht 1.651 m (5' 5\")   Wt 66 kg (145 lb 8.1 oz)   SpO2 95%   BMI 24.21 kg/m²   General appearance: alert and cooperative with exam  Neck: No JVD  Lungs: Moderate air exchange, no wheezing  Heart: regular rate and rhythm, S1, S2 normal, no gallop  Abdomen: Soft, non tender, + BS  Extremities: no cyanosis or clubbing.  Trace edema    LABs:  CBC:   Recent Labs     02/25/24  0319 02/26/24  0323 02/27/24  0349   WBC 3.6 7.9 8.3   HGB 12.1 13.1 12.0   HCT 38.1 41.2 37.9    156 143       BMP:   Recent Labs     02/25/24  0319 02/26/24  0323   * 132*   K 4.3 3.8   CO2 20 23   BUN 62* 61*   CREATININE 5.5* 4.8*   LABGLOM 8* 9*   GLUCOSE 339* 295*       Lab Results   Component Value Date/Time    POCPH 7.447 02/21/2024 11:41 PM    POCPCO2 27.8 02/21/2024 11:41 PM    POCPO2 54.1 02/21/2024 11:41 PM    POCHCO3 19.2 02/21/2024 11:41 PM    XAHE4AWA 89.7 02/21/2024 11:41 PM    FIO2 50.0 02/21/2024 11:41 PM     Radiology:  X-ray chest: 2/24/2024      Impression/recommendations:  Chronic hypoxic respiratory failure  Oxygen by nasal cannula high flow as needed to saturation above 90%, currently at 1 L/min  BiPAP support as needed  Incentive spirometry every hour awake    Pulmonary edema/possible aspiration  Hemodialysis fluid removal per nephrology  Speech swallow evaluation/video swallow - results noted, diet per speech recommendations  NG tube removed    Influenza A/COPD exacerbation  DuoNeb by nebulizer  Prednisone taper  Droplet isolation  Completed  Tamiflu  Rocephin IV     Change mental status   Monitor MS, currently doing better    Hypertension-diabetes  BP controlled/monitor blood sugar    A-fib/CHF/CAD/PAD   Check troponin BNP  On Eliquis    rheumatoid arthritis  Rheumatology follow-up as outpatient    Discussed with RN   physical therapy beside  PUD prophylaxis  DVT prophylaxis, on therapeutic anticoagulation on Eliquis  Electronically signed by   Helen Osorio MD on 2/27/2024 at 5:05 PM  Pulmonary Critical Care and Sleep Medicine,  [facility]  Office: [phone]

## 2024-02-27 NOTE — DISCHARGE SUMMARY
[facility]  Office: [phone]  Blue Warner DO, Jalil Medina DO, Maurice Webb DO, Vaibhav Hilliard DO, Preston Christensen MD, Kimmy Mckinney MD, Rosita Eaton MD, Madina Pan MD,  Rohan Espinoza MD, Geraldine Hopper MD, Talia Hernandez MD,  Rom Musa DO, Gil Treadwell MD, Chandu Serna MD, Davey Warner DO, Liz Mathew MD,  Michael Celaya DO, Milly Schwarz MD, Jaclyn Cummings MD, Swapna Ferguson MD, Amado Dove MD,  Marvin Kaufman MD, Claribel Ochoa MD, Bella Cox MD, Alvino Blas MD, Adolfo Donnelly MD, Cassie Alvarado MD, Ezio Barrios DO, Gilberto Mendieta DO, Yeimy Martin MD,  Jesu Damon MD, Shirley Waterhouse, CNP,  Елена Inman CNP, Devin Sepulveda, CNP,  Karyn Urena, DNP, Tracy Guerra, CNP, Marilin Wiggins, CNP, Tasha Gonzales CNP, Donna Macario, CNP, Lauren Vail, CNP, Danette Guadarrama, PA-C, Maya Bond PA-C, Violette Doty, CNP, Danielle Luna, CNP, Margarita Valdivia, CNP, Aurora Benites, CNS, Lelia Wilson, CNP, Candy Williamson, CNP, Tracy Schwab, CNP         [facility]   IN-PATIENT SERVICE   [facility]    Discharge Summary     Patient ID: Allegra Meza  :  1950   MRN: [id number]     ACCOUNT:  [id number]   Patient's PCP: Solomno Woodard MD  Admit Date: 2024   Discharge Date: 2024     Length of Stay: 6  Code Status:  DNR-CCA  Admitting Physician: Vaibhav Hilliard DO  Discharge Physician: Maurice Webb DO     Active Discharge Diagnoses:     Hospital Problem Lists:  Principal Problem:    Multifocal pneumonia  Active Problems:    Presence of cardiac pacemaker    Hypertension    End stage renal failure on dialysis (HCC)    Diabetes mellitus with peripheral vascular disease (HCC)    Congestive heart failure (HCC)    Acute exacerbation of chronic obstructive pulmonary disease (HCC)    Influenza A    Disorientation  Resolved Problems:    * No resolved hospital problems. *      Admission Condition:   {condition:28214}     Discharged Condition: {condition:07253}    Hospital Stay:     Hospital Course:  Allegra Meza is a 73 y.o. female who was admitted for the management of  ***Multifocal pneumonia , presented to ER with ***No chief complaint on file.          Significant therapeutic interventions: ***    Significant Diagnostic Studies:   Labs / Micro:  {LABS:262592011}   ***  Radiology:  FL MODIFIED BARIUM SWALLOW W VIDEO    Result Date: 2/26/2024  Select aspiration of thin liquid when given by straw.  Laryngeal penetration of thick liquid, puree, soft solid and cookie. Please see separate speech pathology report for full discussion of findings and recommendations.     XR CHEST PORTABLE    Result Date: 2/24/2024  Mildly improved aeration left lung base, likely improving atelectasis. Left perihilar/left basilar airspace disease remains.     FL MODIFIED BARIUM SWALLOW W VIDEO    Result Date: 2/23/2024  Intermittent laryngeal penetration with at least 1 episode of aspiration during the exam. Please see separate speech pathology report for full discussion of findings and recommendations.     IR PLACE NG TUBE BY DR ARIAS FLUORO    Result Date: 2/23/2024  16 Pashto nasogastric tube placed successfully with its tip in the stomach     XR CHEST PORTABLE    Result Date: 2/22/2024  1. Persistent opacification of the left lung base in keeping with underlying effusion, atelectasis or pneumonia. 2. Improvement in the aeration of the right lung.     XR CHEST PORTABLE    Result Date: 2/22/2024  Cardiomegaly with diffuse bilateral interstitial and airspace opacities, which may represent pulmonary edema and/or pneumonia.  Possible left pleural effusion.       Consultations:    Consults:     Final Specialist Recommendations/Findings:   IP CONSULT TO NEPHROLOGY  IP CONSULT TO PULMONOLOGY  IP CONSULT TO PHARMACY  IP CONSULT TO PALLIATIVE CARE  IP CONSULT TO DIETITIAN  IP CONSULT TO DIETITIAN      The patient was seen and examined on

## 2024-02-27 NOTE — DISCHARGE INSTR - COC
Continuity of Care Form    Patient Name: Allegra Meza   :  1950  MRN:  [id number]    Admit date:  2024  Discharge date:  ***    Code Status Order: DNR-CCA   Advance Directives:     Admitting Physician:  Vaibhav Hilliard DO  PCP: Solomon Woodard MD    Discharging Nurse: ***  Discharging Hospital Unit/Room#: 1104/1104-01  Discharging Unit Phone Number: ***    Emergency Contact:   Extended Emergency Contact Information  Primary Emergency Contact: DerrickWilson  Address: [address] [place] [address]  Home Phone: [phone]  Mobile Phone: [phone]  Relation: Spouse  Preferred language: English   needed? No    Past Surgical History:  Past Surgical History:   Procedure Laterality Date    AV FISTULA REPAIR      x 2    BLADDER REPAIR       SECTION       SECTION       SECTION      CORONARY ANGIOPLASTY WITH STENT PLACEMENT      HYSTERECTOMY (CERVIX STATUS UNKNOWN)      w/o bso    JOINT REPLACEMENT      LITHOTRIPSY      PACEMAKER PLACEMENT      TONSILLECTOMY      TUBAL LIGATION      TYMPANOPLASTY         Immunization History:   Immunization History   Administered Date(s) Administered    COVID-19, MODERNA Bivalent, (age 12y+), IM, 50 mcg/0.5 mL 10/17/2022    COVID-19, PFIZER PURPLE top, DILUTE for use, (age 12 y+), 30mcg/0.3mL 02/15/2022       Active Problems:  Patient Active Problem List   Diagnosis Code    Pneumonia due to organism J18.9    Multifocal pneumonia J18.9    Presence of cardiac pacemaker Z95.0    Rheumatoid arthritis (HCC) M06.9    Peripheral vascular disorder due to diabetes mellitus (HCC) E11.51    Obstructive sleep apnea syndrome G47.33    Hypertension I10    End stage renal failure on dialysis (HCC) N18.6, Z99.2    Diabetes mellitus with peripheral vascular disease (HCC) E11.51    Congestive heart failure ([facility]) I50.9    Arteriosclerosis of coronary artery I25.10    Acute exacerbation of  example:201645712}  Last Modified Barium Swallow with Video (Video Swallowing Test): {Done Not Done Date:}    Treatments at the Time of Hospital Discharge:   Respiratory Treatments: ***  Oxygen Therapy:  {Therapy; copd oxygen:80500}  Ventilator:    {[facility] Vent List:482709124}    Rehab Therapies: {THERAPEUTIC INTERVENTION:4445976248}  Weight Bearing Status/Restrictions: {[facility] Weight Bearin}  Other Medical Equipment (for information only, NOT a DME order):  {EQUIPMENT:563140790}  Other Treatments: ***    Patient's personal belongings (please select all that are sent with patient):  {CHP DME Belongings:187336502}    RN SIGNATURE:  {Esignature:039617785}    CASE MANAGEMENT/SOCIAL WORK SECTION    Inpatient Status Date: ***    Readmission Risk Assessment Score:  Readmission Risk              Risk of Unplanned Readmission:  23           Discharging to Facility/ Agency   Name: [facility]  Address: [address] [place] [address] [place] [address]  Phone:  [phone]  Fax: [phone]     / signature: Electronically signed by VIVIANA Mata on 24 at 2:36 PM EST    PHYSICIAN SECTION    Prognosis: Fair    Condition at Discharge: Stable    Rehab Potential (if transferring to Rehab): Fair    Recommended Labs or Other Treatments After Discharge: PT and OT evaluate and treat    Physician Certification: I certify the above information and transfer of Allegra Meza  is necessary for the continuing treatment of the diagnosis listed and that she requires Skilled Nursing Facility for less 30 days.     Update Admission H&P: No change in H&P    PHYSICIAN SIGNATURE:  Electronically signed by Maurice Webb DO on 24 at 11:51 AM EST   Alert and oriented, no focal deficits, no motor or sensory deficits.

## 2024-02-27 NOTE — PLAN OF CARE
Problem: Respiratory - Adult  Goal: Achieves optimal ventilation and oxygenation  2/27/2024 0815 by Tisha Franklin RCP  Outcome: Progressing  2/27/2024 0507 by Georgiana Francois RN  Outcome: Progressing  2/26/2024 1926 by Ashley Espinoza RCP  Outcome: Progressing  Goal: Able to breathe comfortably  2/27/2024 0815 by Tisha Franklin RCP  Outcome: Progressing  2/26/2024 1926 by Ashley Espinoza RCP  Outcome: Progressing

## 2024-02-27 NOTE — PROGRESS NOTES
Physical Therapy  Facility/Department: [facility] ICU  Physical Therapy Daily Treatment note   Goals revised    Name: Allegra Meza  : 1950  MRN: [id number]  Date of Service: 2024    Discharge Recommendations:  Pt currently functioning below baseline.  Recommend daily inpatient skilled therapy at time of discharge to maximize long term outcomes and prevent re-admission. Please refer to AM-PAC score for current level of function.        Allegra Meza is a 73 y.o. Non- / non  female who presents with No chief complaint on file.   and is admitted to the hospital for the management of Community acquired pneumonia of left lower lobe of lung.     Patient is very [place] and oriented to self only. She was transferred from [facility] after being evaluated for fever, cough, and sore throat. CXR was completed and showed atelectasis vs left lower lobe pneumonia and she was given 750 mg Levaquin. She is a dialysis patient, and decision was made to transfer patient to [facility] for further management of LLL pneumonia. She tested positive for influenza A. Upon arrival to the PCU, patient was found to be sl hypoxic at 88%, tachypneic with increased work of breathing. ABGs showed pCO2 27.8, pO2 54.1, pHCO3 19.2, pH 7.4. She was placed on HFNC and SpO2 continued to stay in the 80s with no change in work of breathing. Another CXR was taken and showed cardiomegaly with diffuse bilateral interstitial and airspace opacities, suggestive of pulmonary edema and or pneumonia. She has a PMH of COPD and wears O2 at home, atrial fib on Eliquis, anxiety, GERD, CHF, and HTN. Pulmonary consult was placed. BP was noted to be very high with SBP in the 190s- patient missed HD treatment today due to illness.       Patient Diagnosis(es): There were no encounter diagnoses.  Past Medical History:  has a past medical history of Anxiety, Asthma, Atrial fibrillation (HCC), CAD (coronary artery disease), CHF (congestive heart

## 2024-02-27 NOTE — PLAN OF CARE
Problem: Respiratory - Adult  Goal: Achieves optimal ventilation and oxygenation  2/26/2024 1926 by Ashley Espinoza RCP  Outcome: Progressing     Problem: Respiratory - Adult  Goal: Able to breathe comfortably  2/26/2024 1926 by Ashley Espinoza, CHANTALE  Outcome: Progressing

## 2024-02-28 VITALS
HEART RATE: 70 BPM | OXYGEN SATURATION: 94 % | RESPIRATION RATE: 16 BRPM | BODY MASS INDEX: 22.7 KG/M2 | TEMPERATURE: 97.7 F | HEIGHT: 65 IN | WEIGHT: 136.24 LBS | DIASTOLIC BLOOD PRESSURE: 73 MMHG | SYSTOLIC BLOOD PRESSURE: 166 MMHG

## 2024-02-28 LAB
ANION GAP SERPL CALCULATED.3IONS-SCNC: 14 MMOL/L (ref 9–17)
BASOPHILS # BLD: <0.03 K/UL (ref 0–0.2)
BASOPHILS NFR BLD: 0 % (ref 0–2)
BUN SERPL-MCNC: 75 MG/DL (ref 8–23)
BUN/CREAT SERPL: 15 (ref 9–20)
CALCIUM SERPL-MCNC: 8.8 MG/DL (ref 8.6–10.4)
CHLORIDE SERPL-SCNC: 94 MMOL/L (ref 98–107)
CO2 SERPL-SCNC: 21 MMOL/L (ref 20–31)
CREAT SERPL-MCNC: 5 MG/DL (ref 0.5–0.9)
EOSINOPHIL # BLD: <0.03 K/UL (ref 0–0.44)
EOSINOPHILS RELATIVE PERCENT: 0 % (ref 1–4)
ERYTHROCYTE [DISTWIDTH] IN BLOOD BY AUTOMATED COUNT: 14.6 % (ref 11.8–14.4)
GFR SERPL CREATININE-BSD FRML MDRD: 9 ML/MIN/1.73M2
GLUCOSE BLD-MCNC: 140 MG/DL (ref 65–105)
GLUCOSE BLD-MCNC: 153 MG/DL (ref 65–105)
GLUCOSE BLD-MCNC: 94 MG/DL (ref 65–105)
GLUCOSE SERPL-MCNC: 97 MG/DL (ref 70–99)
HCT VFR BLD AUTO: 36.4 % (ref 36.3–47.1)
HGB BLD-MCNC: 11.6 G/DL (ref 11.9–15.1)
IMM GRANULOCYTES # BLD AUTO: 0.03 K/UL (ref 0–0.3)
IMM GRANULOCYTES NFR BLD: 0 %
LYMPHOCYTES NFR BLD: 1.05 K/UL (ref 1.1–3.7)
LYMPHOCYTES RELATIVE PERCENT: 11 % (ref 24–43)
MAGNESIUM SERPL-MCNC: 2.2 MG/DL (ref 1.6–2.6)
MCH RBC QN AUTO: 30.7 PG (ref 25.2–33.5)
MCHC RBC AUTO-ENTMCNC: 31.9 G/DL (ref 28.4–34.8)
MCV RBC AUTO: 96.3 FL (ref 82.6–102.9)
MONOCYTES NFR BLD: 0.65 K/UL (ref 0.1–1.2)
MONOCYTES NFR BLD: 7 % (ref 3–12)
NEUTROPHILS NFR BLD: 82 % (ref 36–65)
NEUTS SEG NFR BLD: 7.65 K/UL (ref 1.5–8.1)
NRBC BLD-RTO: 0 PER 100 WBC
PHOSPHATE SERPL-MCNC: 3.3 MG/DL (ref 2.6–4.5)
PLATELET # BLD AUTO: 153 K/UL (ref 138–453)
PMV BLD AUTO: 11.7 FL (ref 8.1–13.5)
POTASSIUM SERPL-SCNC: 4.4 MMOL/L (ref 3.7–5.3)
RBC # BLD AUTO: 3.78 M/UL (ref 3.95–5.11)
RBC # BLD: ABNORMAL 10*6/UL
SODIUM SERPL-SCNC: 129 MMOL/L (ref 135–144)
WBC OTHER # BLD: 9.4 K/UL (ref 3.5–11.3)

## 2024-02-28 PROCEDURE — 6360000002 HC RX W HCPCS: Performed by: INTERNAL MEDICINE

## 2024-02-28 PROCEDURE — 36415 COLL VENOUS BLD VENIPUNCTURE: CPT

## 2024-02-28 PROCEDURE — 6370000000 HC RX 637 (ALT 250 FOR IP): Performed by: INTERNAL MEDICINE

## 2024-02-28 PROCEDURE — 84100 ASSAY OF PHOSPHORUS: CPT

## 2024-02-28 PROCEDURE — 99232 SBSQ HOSP IP/OBS MODERATE 35: CPT | Performed by: INTERNAL MEDICINE

## 2024-02-28 PROCEDURE — 80048 BASIC METABOLIC PNL TOTAL CA: CPT

## 2024-02-28 PROCEDURE — 85025 COMPLETE CBC W/AUTO DIFF WBC: CPT

## 2024-02-28 PROCEDURE — 2500000003 HC RX 250 WO HCPCS: Performed by: INTERNAL MEDICINE

## 2024-02-28 PROCEDURE — 2580000003 HC RX 258: Performed by: NURSE PRACTITIONER

## 2024-02-28 PROCEDURE — P9047 ALBUMIN (HUMAN), 25%, 50ML: HCPCS | Performed by: INTERNAL MEDICINE

## 2024-02-28 PROCEDURE — 94640 AIRWAY INHALATION TREATMENT: CPT

## 2024-02-28 PROCEDURE — 83735 ASSAY OF MAGNESIUM: CPT

## 2024-02-28 PROCEDURE — 6370000000 HC RX 637 (ALT 250 FOR IP): Performed by: NURSE PRACTITIONER

## 2024-02-28 PROCEDURE — 90935 HEMODIALYSIS ONE EVALUATION: CPT

## 2024-02-28 PROCEDURE — 94761 N-INVAS EAR/PLS OXIMETRY MLT: CPT

## 2024-02-28 PROCEDURE — 82947 ASSAY GLUCOSE BLOOD QUANT: CPT

## 2024-02-28 RX ORDER — ALBUMIN (HUMAN) 12.5 G/50ML
25 SOLUTION INTRAVENOUS
Status: COMPLETED | OUTPATIENT
Start: 2024-02-28 | End: 2024-02-28

## 2024-02-28 RX ORDER — HYDRALAZINE HYDROCHLORIDE 25 MG/1
25 TABLET, FILM COATED ORAL EVERY 8 HOURS SCHEDULED
Status: DISCONTINUED | OUTPATIENT
Start: 2024-02-28 | End: 2024-02-28 | Stop reason: HOSPADM

## 2024-02-28 RX ADMIN — Medication 3.2 ML: at 14:58

## 2024-02-28 RX ADMIN — ALBUMIN (HUMAN) 25 G: 0.25 INJECTION, SOLUTION INTRAVENOUS at 14:06

## 2024-02-28 RX ADMIN — ONDANSETRON 4 MG: 4 TABLET, ORALLY DISINTEGRATING ORAL at 14:08

## 2024-02-28 RX ADMIN — POLYETHYLENE GLYCOL 3350 17 G: 17 POWDER, FOR SOLUTION ORAL at 09:17

## 2024-02-28 RX ADMIN — PREDNISOLONE ACETATE 1 DROP: 10 SUSPENSION/ DROPS OPHTHALMIC at 15:06

## 2024-02-28 RX ADMIN — IPRATROPIUM BROMIDE AND ALBUTEROL SULFATE 1 DOSE: 2.5; .5 SOLUTION RESPIRATORY (INHALATION) at 08:22

## 2024-02-28 RX ADMIN — PREDNISONE 40 MG: 20 TABLET ORAL at 09:17

## 2024-02-28 RX ADMIN — Medication 3.3 ML: at 14:58

## 2024-02-28 RX ADMIN — CARVEDILOL 12.5 MG: 12.5 TABLET, FILM COATED ORAL at 09:17

## 2024-02-28 RX ADMIN — HYDRALAZINE HYDROCHLORIDE 25 MG: 25 TABLET, FILM COATED ORAL at 15:06

## 2024-02-28 RX ADMIN — HYDRALAZINE HYDROCHLORIDE 50 MG: 50 TABLET ORAL at 06:24

## 2024-02-28 RX ADMIN — CARVEDILOL 12.5 MG: 12.5 TABLET, FILM COATED ORAL at 16:14

## 2024-02-28 RX ADMIN — PHENOL 1 SPRAY: 1.4 SPRAY ORAL at 09:18

## 2024-02-28 RX ADMIN — APIXABAN 5 MG: 5 TABLET, FILM COATED ORAL at 09:17

## 2024-02-28 RX ADMIN — PREDNISOLONE ACETATE 1 DROP: 10 SUSPENSION/ DROPS OPHTHALMIC at 09:18

## 2024-02-28 RX ADMIN — SODIUM CHLORIDE, PRESERVATIVE FREE 10 ML: 5 INJECTION INTRAVENOUS at 09:25

## 2024-02-28 ASSESSMENT — PAIN DESCRIPTION - DESCRIPTORS: DESCRIPTORS: SORE

## 2024-02-28 ASSESSMENT — PAIN SCALES - GENERAL: PAINLEVEL_OUTOF10: 8

## 2024-02-28 ASSESSMENT — PAIN DESCRIPTION - LOCATION: LOCATION: THROAT

## 2024-02-28 ASSESSMENT — PAIN DESCRIPTION - ORIENTATION: ORIENTATION: INNER

## 2024-02-28 NOTE — PLAN OF CARE
Problem: Respiratory - Adult  Goal: Achieves optimal ventilation and oxygenation  2/28/2024 0824 by Tisha Franklin RCP  Outcome: Progressing  2/27/2024 1936 by Ashley Espinoza RCP  Outcome: Progressing  2/27/2024 1833 by Danette Steward RN  Outcome: Progressing  Goal: Able to breathe comfortably  2/28/2024 0824 by Tisha Franklin RCP  Outcome: Progressing  2/27/2024 1936 by Ashley Espinoza RCP  Outcome: Progressing

## 2024-02-28 NOTE — PROGRESS NOTES
End Of Shift Note  [facility]  Summary of shift: Uneventful shift. Patient continues to express needing to \"poop\" but has been unsuccessful. No PRN hydralazine needed over night, BP well controlled with oral scheduled medications. No complaints of pain or discomfort.     Vitals:    Vitals:    02/28/24 0000 02/28/24 0200 02/28/24 0400 02/28/24 0600   BP: (!) 158/82 (!) 140/98 (!) 156/78 (!) 162/74   Pulse: 70 70 70 70   Resp: 14 13 13 12   Temp: 97.8 °F (36.6 °C)  99 °F (37.2 °C)    TempSrc: Oral  Oral    SpO2: 94% 95% 94% 94%   Weight:       Height:            I&O:   Intake/Output Summary (Last 24 hours) at 2/28/2024 0636  Last data filed at 2/27/2024 1153  Gross per 24 hour   Intake 234 ml   Output --   Net 234 ml       Resp Status: remains on room air.     Ventilator Settings:     / / /FiO2 : 40 %    Critical Care IV infusions:   dextrose      sodium chloride Stopped (02/23/24 1610)        LDA:   Peripheral IV 02/21/24 Distal;Left;Anterior Forearm (Active)   Number of days: 7       Tunneled Hemodialysis Catheter Left Thigh (Active)   Number of days:

## 2024-02-28 NOTE — PROGRESS NOTES
Reason for Follow up: ESRD.    Interim History:  Pt seen and examined at the bedside.  Patient is awake and alert.  She is pleasantly confused.  She has been hypertensive this morning.  Denies any dyspnea.  Labs meds reviewed.  She has a left femoral tunneled dialysis catheter in place.    Scheduled Meds:   polyethylene glycol  17 g Oral Daily    hydrALAZINE  50 mg Oral 3 times per day    insulin lispro  0-16 Units SubCUTAneous Q6H    predniSONE  40 mg Oral Daily    ipratropium 0.5 mg-albuterol 2.5 mg  1 Dose Inhalation BID RT    insulin glargine  15 Units SubCUTAneous Nightly    apixaban  5 mg Oral BID    carvedilol  12.5 mg Oral BID WC    latanoprost  1 drop Left Eye Nightly    prednisoLONE acetate  1 drop Left Eye TID    sodium chloride flush  5-40 mL IntraVENous 2 times per day   Continuous Infusions:   dextrose      sodium chloride Stopped (02/23/24 1610)     PRN Meds:phenol, hydrALAZINE **OR** hydrALAZINE, ALPRAZolam, glucose, dextrose bolus **OR** dextrose bolus, glucagon (rDNA), dextrose, anticoagulant sodium citrate, anticoagulant sodium citrate, sodium chloride flush, sodium chloride, ondansetron **OR** ondansetron, acetaminophen **OR** acetaminophen, albuterol    Allergies   Allergen Reactions    Amlodipine Swelling    Betamethasone Other (See Comments)     States \"passed out\"    Diclofenac-Misoprostol Hives    Penicillins Rash and Hives    Phenylephrine-Guaifenesin Hives    Propranolol Hives    Quetiapine Anxiety     States caused anxiety attacks    Adhesive Tape     Clonidine Derivatives     Cymbalta [Duloxetine Hcl]     Entex T [Pseudoephedrine-Guaifenesin]     Inderal La [Propranolol Hcl]     Mometasone Other (See Comments)    Codeine Anxiety, Headaches and Other (See Comments)     States makes her jittery    Morphine Headaches and Nausea Only     \"makes me sick to my stomach\"    Sulfamethoxazole-Trimethoprim Other (See Comments)     UNKNOWN    Tapentadol Anxiety       Physical Exam:  Blood pressure  (!) 160/80, pulse 70, temperature 97.7 °F (36.5 °C), temperature source Oral, resp. rate 16, height 1.651 m (5' 5\"), weight 61.8 kg (136 lb 3.9 oz), SpO2 93 %.  In: 234 [NG/GT:234]  Out: -   In: 234   Out: -     General:  Awake, alert, not in distress. Appears to be stated age.  HEENT: Atraumatic, normocephalic. Anicteric sclera. Pink and moist oral mucosa.   Neck: No JVD.  Chest: Symmetrical, Bilateral air entry, decreased BS  Cardiovascular: S1S2, no murmur, rub or gallop. Has lower extremity edema.    Abdomen: Soft, non tender to palpation.   Musculoskeletal:  No cyanosis or clubbing.  Integumentary: Pink, warm and dry. Free from rash or lesions. Skin turgor normal.  CNS: Face symmetrical. No tremor.     Data:  CBC:   Lab Results   Component Value Date    WBC 9.4 02/28/2024    HGB 11.6 (L) 02/28/2024    HCT 36.4 02/28/2024    MCV 96.3 02/28/2024     02/28/2024     BMP:    Lab Results   Component Value Date     (L) 02/28/2024    K 4.4 02/28/2024    CL 94 (L) 02/28/2024    CO2 21 02/28/2024    BUN 75 (H) 02/28/2024    CREATININE 5.0 (H) 02/28/2024    GLUCOSE 97 02/28/2024     CMP:   Lab Results   Component Value Date     (L) 02/28/2024    K 4.4 02/28/2024    CL 94 (L) 02/28/2024    CO2 21 02/28/2024    BUN 75 (H) 02/28/2024    CREATININE 5.0 (H) 02/28/2024    GLUCOSE 97 02/28/2024    CALCIUM 8.8 02/28/2024    PROT 7.2 02/22/2024    LABALBU 3.7 02/22/2024    BILITOT 0.3 02/22/2024    ALKPHOS 139 (H) 02/22/2024    AST 12 02/22/2024    ALT 7 02/22/2024      Hepatic:   Lab Results   Component Value Date    AST 12 02/22/2024    ALT 7 02/22/2024    BILITOT 0.3 02/22/2024    ALKPHOS 139 (H) 02/22/2024     Phosphorus:    Lab Results   Component Value Date    PHOS 3.3 02/28/2024     Ionized Calcium: No results found for: \"IONCA\"  Magnesium:   Lab Results   Component Value Date    MG 2.2 02/28/2024     Albumin:   Lab Results   Component Value Date    LABALBU 3.7 02/22/2024     Last 3 CK, CKMB, Troponin:

## 2024-02-28 NOTE — PROGRESS NOTES
[facility]  Office: [phone]  Blue Warner DO, Jalil Medina DO, Maurice Webb DO, Vaibhav Hilliard, DO, Preston Christensen MD, Kimmy Mckinney MD, Rosita Eaton MD, Madina Pan MD,  Rohan Espinoza MD, Geraldine Hopper MD, Talia Hernandez MD,  Rom Musa DO, Gil Treadwell MD, Chandu Serna MD, Davey Warner DO, Liz Mathew MD,  Michael Celaya DO, Milly Schwarz MD, Jaclyn Cummings MD, Swapna Ferguson MD, Amado Dove MD,  Marvin Kaufman MD, Claribel Ochoa MD, Bella Cox MD, Alvino Blas MD, Adolfo Donnelly MD, Cassie Alvarado MD, Ezio Barrios DO, Gilberto Mendieta DO, Yeimy Martin MD,  Jesu Damon MD, Shirley Waterhouse, CNP,  Елена Inman, CNP, Devin Sepulveda, CNP,  Karyn Urena, DNP, Tracy Guerra, CNP, Marilin Wiggins, CNP, Tasha Gonzales CNP, Donna Macario, CNP, Lauren Vail, CNP, Danette Guadarrama, PA-C, Maya Bond, PA-C, Violette Doty, CNP, Danielle Luna, CNP, Margarita Valdivia, CNP, Aurora Benites, CNS, Lelia Wilson, CNP, Candy Williamson, CNP, Tracy Schwab, CNP         [facility]   IN-PATIENT SERVICE   [facility]    Progress Note    2/28/2024    11:57 AM    Name:   Allegra Meza  MRN:     [id number]     Acct:      [id number]   Room:   1104/1104-01   Day:  7  Admit Date:  2/21/2024  9:30 PM    PCP:   Solomon Woodard MD  Code Status:  DNR-CCA    Subjective:     C/C: Shortness of breath    Interval History Status: improved.     Patient is resting in bed, denies any complaints of chest pain, shortness of breath, nausea or vomiting, fevers or chills.  Tolerating diet.    Brief History:     Per my MILAGRO:  \"Allegra Meza is a 73 y.o. Non- / non  female who presents with No chief complaint on file.   and is admitted to the hospital for the management of Community acquired pneumonia of left lower lobe of lung.     Patient is very [place] and oriented to self only. She was transferred from [facility] after being evaluated for  Tracy Guerra, APRN - NP     Formatting of this note might be different from the original.   Added automatically from request for surgery [id number]         Congestive heart failure (HCC) 2/22/2024 Yes    Acute exacerbation of chronic obstructive pulmonary disease (HCC) 2/22/2024 Yes    Influenza A 2/22/2024 Yes    Disorientation 2/22/2024 Yes       Plan:        Continue minced and moist diet  PT, OT and speech therapy  Monitor and control blood pressure  GI and DVT prophylaxis  Insulin scale  Dialysis per nephrology  Discharge plan to skilled facility pending precertification    Maurice Webb DO  2/28/2024  11:57 AM

## 2024-02-28 NOTE — PLAN OF CARE
Problem: Respiratory - Adult  Goal: Achieves optimal ventilation and oxygenation  2/27/2024 1936 by Ashley Espinoza RCP  Outcome: Progressing     Problem: Respiratory - Adult  Goal: Able to breathe comfortably  2/27/2024 1936 by Ashley Espinoza, CHANTALE  Outcome: Progressing

## 2024-02-28 NOTE — PLAN OF CARE
Cardiology    Pt to have pericardial window today.  Will need to resume Warfarin/Heparin drip soon postop due to mechanical AVR.      Jany Cochran NP 11/5/2021    Addendum  Will check on pt Saturday to review AC status.     Jany Cochran NP  11/5/2021     Problem: Chronic Conditions and Co-morbidities  Goal: Patient's chronic conditions and co-morbidity symptoms are monitored and maintained or improved  Outcome: Adequate for Discharge  Flowsheets (Taken 2/28/2024 0800)  Care Plan - Patient's Chronic Conditions and Co-Morbidity Symptoms are Monitored and Maintained or Improved:   Monitor and assess patient's chronic conditions and comorbid symptoms for stability, deterioration, or improvement   Collaborate with multidisciplinary team to address chronic and comorbid conditions and prevent exacerbation or deterioration   Update acute care plan with appropriate goals if chronic or comorbid symptoms are exacerbated and prevent overall improvement and discharge     Problem: Discharge Planning  Goal: Discharge to home or other facility with appropriate resources  Outcome: Adequate for Discharge  Flowsheets (Taken 2/28/2024 0800)  Discharge to home or other facility with appropriate resources:   Identify barriers to discharge with patient and caregiver   Arrange for needed discharge resources and transportation as appropriate   Identify discharge learning needs (meds, wound care, etc)   Refer to discharge planning if patient needs post-hospital services based on physician order or complex needs related to functional status, cognitive ability or social support system   Pt has a  time set for 3pm today to go to [facility] after HD treatment here.    Problem: Respiratory - Adult  Goal: Achieves optimal ventilation and oxygenation  2/28/2024 1119 by Michelle Barnes RN  Outcome: Adequate for Discharge   Patients respiratory status stable at this time. No signs or symptoms of distress noted. Respirations non-labored and regular. Pt is on room air/not requiring any oxygen therapy. Continuous SpO2 monitoring in place.     Problem: Safety - Adult  Goal: Free from fall injury  Outcome: Adequate for Discharge   Pt remains free from falls and in fall precautions at this time. Bed is  in lowest position with all wheels locked and 3/4 side rails up. Call light, bedside table, and personal belongings within reach of pt. Nurse educated on proper use of call light. Pt acknowledged teaching. Pt wearing nonskid socks when out of bed and has steady gait with use of walker and assistance. Nurse will continue to assess and monitor pt with hourly rounds and offer toileting.     Problem: Nutrition Deficit:  Goal: Optimize nutritional status  Outcome: Adequate for Discharge     Problem: Skin/Tissue Integrity  Goal: Absence of new skin breakdown  Description: 1.  Monitor for areas of redness and/or skin breakdown  2.  Assess vascular access sites hourly  3.  Every 4-6 hours minimum:  Change oxygen saturation probe site  4.  Every 4-6 hours:  If on nasal continuous positive airway pressure, respiratory therapy assess nares and determine need for appliance change or resting period.  Outcome: Adequate for Discharge   Continuing to monitor for skin integrity risks. Patient intervention includes turn and position every 2 hours. Turning/repositioning encouraged at least once every 2 hrs, and prn basis. Hygiene care being completed independently per nursing staff. No evidence of any new skin integrity issues noted.     Problem: ABCDS Injury Assessment  Goal: Absence of physical injury  Outcome: Adequate for Discharge   Pt remains free from any physical injuries at this time. Will continue to provide a safe environment for pt. Will continue to assess and monitor pt with hourly rounds.    Problem: Pain  Goal: Verbalizes/displays adequate comfort level or baseline comfort level  Outcome: Adequate for Discharge

## 2024-02-28 NOTE — PROGRESS NOTES
Pulmonary Critical Care Progress Note       Patient seen for the follow up of chronic hypoxic respiratory failure, pulm edema, influenza A, COPD exacerbation, change in mental status, A-fib    Subjective:  She is lying comfortable in the bed. She is on room air with o2 saturation of 94%. NG tube has been removed. She denies any chest pain.  She denies significant cough.    Examination:  Vitals: /71   Pulse 70   Temp 97.5 °F (36.4 °C) (Oral)   Resp 14   Ht 1.651 m (5' 5\")   Wt 61.8 kg (136 lb 3.9 oz)   SpO2 95%   BMI 22.67 kg/m²   General appearance: alert and cooperative with exam  Neck: No JVD  Lungs: Moderate air exchange, no wheezing  Heart: regular rate and rhythm, S1, S2 normal, no gallop  Abdomen: Soft, non tender, + BS  Extremities: no cyanosis or clubbing.  Trace edema    LABs:  CBC:   Recent Labs     02/26/24  0323 02/27/24  0349 02/28/24  0338   WBC 7.9 8.3 9.4   HGB 13.1 12.0 11.6*   HCT 41.2 37.9 36.4    143 153       BMP:   Recent Labs     02/26/24  0323 02/28/24  0338   * 129*   K 3.8 4.4   CO2 23 21   BUN 61* 75*   CREATININE 4.8* 5.0*   LABGLOM 9* 9*   GLUCOSE 295* 97       Lab Results   Component Value Date/Time    POCPH 7.447 02/21/2024 11:41 PM    POCPCO2 27.8 02/21/2024 11:41 PM    POCPO2 54.1 02/21/2024 11:41 PM    POCHCO3 19.2 02/21/2024 11:41 PM    XCJX9BLO 89.7 02/21/2024 11:41 PM    FIO2 50.0 02/21/2024 11:41 PM     Radiology:  X-ray chest: 2/24/2024      Impression/recommendations:  Chronic hypoxic respiratory failure  Oxygen by nasal cannula high flow as needed to saturation above 90%, currently on room air  BiPAP support as needed  Incentive spirometry every hour awake    Pulmonary edema/possible aspiration  Patient was hemodialyzed today.  Speech swallow evaluation/video swallow - results noted, diet per speech recommendations  NG tube removed    Influenza A/COPD exacerbation  DuoNeb by nebulizer  Prednisone taper  Droplet isolation  Completed Tamiflu  Completed

## 2024-02-28 NOTE — CARE COORDINATION
Social work:  Patient to dc to [facility] via [facility] at 4:00PM.  # for RN report: [phone]. Completed DEANNA faxed to [phone].  Informed RN, pt's spouse, and facility of dc time, agreeable to plan.  [facility] will arrange transport for dialysis of Friday, AVS faxed to  [facility].  HENS completed.

## 2024-02-28 NOTE — PROGRESS NOTES
[facility] here to transport pt to [facility]. Report given, all questions asked. Pt's PIV removed, no issues to note and dressing in place. All pt's belongings gathered and placed into multiple pt belonging bags and sent with pt. Pt left unit with JUAN transport at 1643.    Report being called to Marianne at [facility] now. All questions answered at this time. We explained to them the pt's chart was sent transport.

## 2024-02-28 NOTE — PROGRESS NOTES
HEMODIALYSIS PRE-TREATMENT NOTE    Patient Identifiers prior to treatment: Name,      Isolation Required: Yes                      Isolation Type: Droplet/Flu       (please document if patient is being managed as a PUI/COVID-19 patient)        Hepatitis status: Susceptible                          Date Drawn                             Result  Hepatitis B Surface Antigen 24     Neg                     Hepatitis B Surface Antibody 23 Neg        Hepatitis B Core Antibody 22 Neg          How was Hepatitis Status verified: Outpatient labs     Was a copy of the labs you documented provided to facility for the patient's chart: Yes    Hemodialysis orders verified: Yes, orders reviewed with Dr. Jaramillo     Access Within normal limits ( I.e. s/s of infection,...): WNL, no signs or symptoms of infection noted     Pre-Assessment completed: Yes    Pre-dialysis report received from: CHRIS Samaniego                      Time: 09:56

## 2024-02-28 NOTE — PROGRESS NOTES
Treatment time: 210 minutes    Net UF: 2500 mL    Pre weight: 61.8 kg  Post weight: 59.3 kg  EDW: 59.3 kg    Access used: CVC L groin  Access function: good    Medications or blood products given: 25 g albumin    [facility] outpatient schedule: [facility]    Summary of response to treatment: Patient tolerated treatment fairly, albumin given as ordered, 59.3 kg is new EDW, [facility] notified of new dry weight, orders reviewed with Dr. Jaramillo prior to HD, report given to CHRIS Samaniego.    Copy of dialysis treatment record placed in chart, to be scanned into EMR.

## 2024-03-10 NOTE — PROGRESS NOTES
Physician Progress Note      PATIENT:               JANETT JAQUEZ  CSN #:                  [id number]  :                       1950  ADMIT DATE:       2024 9:30 PM  DISCH DATE:        2024 4:45 PM  RESPONDING  PROVIDER #:        Maurice Webb DO          QUERY TEXT:    Pt admitted with influenza.  Pt noted to have pneumonia. If possible, please   document in the progress notes and discharge summary if you are evaluating   and/or treating any of the following:    The medical record reflects the following:  Risk Factors: Dysphagia  Clinical Indicators: Pulmonary noted, Pulmonary edema/possible aspiration.   MBSS with Intermittent laryngeal penetration with at least 1 episode of   aspiration during the exam. Dysphagia Minced and Moist (Dysphagia II) with   Mildly Thick (Tonsina) was recommended.  Treatment: IV Levaquin, IV Rocephin    Thank you,  Tisha COELHO RN  Options provided:  -- Aspiration pneumonia  -- Pneumonia unrelated to aspiration  -- Other - I will add my own diagnosis  -- Disagree - Not applicable / Not valid  -- Disagree - Clinically unable to determine / Unknown  -- Refer to Clinical Documentation Reviewer    PROVIDER RESPONSE TEXT:    This patient has aspiration pneumonia.    Query created by: Jeanne Wiggins on 3/5/2024 1:24 PM      Electronically signed by:  Maurice Webb DO 3/10/2024 7:07 AM

## 2024-03-23 PROBLEM — J10.1 INFLUENZA A: Status: RESOLVED | Noted: 2024-02-22 | Resolved: 2024-03-23

## 2024-06-20 ENCOUNTER — APPOINTMENT (OUTPATIENT)
Dept: GENERAL RADIOLOGY | Age: 74
DRG: 480 | End: 2024-06-20
Payer: MEDICARE

## 2024-06-20 ENCOUNTER — HOSPITAL ENCOUNTER (INPATIENT)
Age: 74
LOS: 4 days | Discharge: NURSING, REHAB OR LONG-TERM CARE | DRG: 480 | End: 2024-06-24
Attending: EMERGENCY MEDICINE | Admitting: STUDENT IN AN ORGANIZED HEALTH CARE EDUCATION/TRAINING PROGRAM
Payer: MEDICARE

## 2024-06-20 ENCOUNTER — APPOINTMENT (OUTPATIENT)
Dept: CT IMAGING | Age: 74
DRG: 480 | End: 2024-06-20
Payer: MEDICARE

## 2024-06-20 DIAGNOSIS — S72.001A CLOSED FRACTURE OF RIGHT HIP, INITIAL ENCOUNTER (HCC): Primary | ICD-10-CM

## 2024-06-20 DIAGNOSIS — S72.001D CLOSED FRACTURE OF RIGHT HIP WITH ROUTINE HEALING, SUBSEQUENT ENCOUNTER: ICD-10-CM

## 2024-06-20 DIAGNOSIS — F41.9 ANXIETY: ICD-10-CM

## 2024-06-20 LAB
ALBUMIN SERPL-MCNC: 3.6 G/DL (ref 3.5–5.2)
ALP SERPL-CCNC: 118 U/L (ref 35–104)
ALT SERPL-CCNC: 8 U/L (ref 5–33)
ANION GAP SERPL CALCULATED.3IONS-SCNC: 17 MMOL/L (ref 9–17)
AST SERPL-CCNC: 12 U/L
BASOPHILS # BLD: 0.06 K/UL (ref 0–0.2)
BASOPHILS NFR BLD: 1 % (ref 0–2)
BILIRUB SERPL-MCNC: 0.3 MG/DL (ref 0.3–1.2)
BUN SERPL-MCNC: 46 MG/DL (ref 8–23)
BUN/CREAT SERPL: 9 (ref 9–20)
CALCIUM SERPL-MCNC: 8.9 MG/DL (ref 8.6–10.4)
CHLORIDE SERPL-SCNC: 90 MMOL/L (ref 98–107)
CO2 SERPL-SCNC: 23 MMOL/L (ref 20–31)
CREAT SERPL-MCNC: 5.3 MG/DL (ref 0.5–0.9)
EOSINOPHIL # BLD: 0.21 K/UL (ref 0–0.44)
EOSINOPHILS RELATIVE PERCENT: 3 % (ref 1–4)
ERYTHROCYTE [DISTWIDTH] IN BLOOD BY AUTOMATED COUNT: 14.6 % (ref 11.8–14.4)
GFR, ESTIMATED: 8 ML/MIN/1.73M2
GLUCOSE BLD-MCNC: 173 MG/DL (ref 65–105)
GLUCOSE BLD-MCNC: 177 MG/DL (ref 65–105)
GLUCOSE SERPL-MCNC: 206 MG/DL (ref 70–99)
HCT VFR BLD AUTO: 38.1 % (ref 36.3–47.1)
HGB BLD-MCNC: 12.1 G/DL (ref 11.9–15.1)
IMM GRANULOCYTES # BLD AUTO: 0.04 K/UL (ref 0–0.3)
IMM GRANULOCYTES NFR BLD: 1 %
INR PPP: 1.4
LYMPHOCYTES NFR BLD: 1.31 K/UL (ref 1.1–3.7)
LYMPHOCYTES RELATIVE PERCENT: 21 % (ref 24–43)
MAGNESIUM SERPL-MCNC: 1.9 MG/DL (ref 1.6–2.6)
MCH RBC QN AUTO: 30.9 PG (ref 25.2–33.5)
MCHC RBC AUTO-ENTMCNC: 31.8 G/DL (ref 28.4–34.8)
MCV RBC AUTO: 97.4 FL (ref 82.6–102.9)
MONOCYTES NFR BLD: 0.71 K/UL (ref 0.1–1.2)
MONOCYTES NFR BLD: 12 % (ref 3–12)
NEUTROPHILS NFR BLD: 62 % (ref 36–65)
NEUTS SEG NFR BLD: 3.78 K/UL (ref 1.5–8.1)
NRBC BLD-RTO: 0 PER 100 WBC
PARTIAL THROMBOPLASTIN TIME: 31.8 SEC (ref 23.9–33.8)
PLATELET # BLD AUTO: 159 K/UL (ref 138–453)
PMV BLD AUTO: 9.9 FL (ref 8.1–13.5)
POTASSIUM SERPL-SCNC: 4.2 MMOL/L (ref 3.7–5.3)
PROT SERPL-MCNC: 6.2 G/DL (ref 6.4–8.3)
PROTHROMBIN TIME: 16.8 SEC (ref 11.5–14.2)
RBC # BLD AUTO: 3.91 M/UL (ref 3.95–5.11)
RBC # BLD: ABNORMAL 10*6/UL
SODIUM SERPL-SCNC: 130 MMOL/L (ref 135–144)
WBC OTHER # BLD: 6.1 K/UL (ref 3.5–11.3)

## 2024-06-20 PROCEDURE — 73502 X-RAY EXAM HIP UNI 2-3 VIEWS: CPT

## 2024-06-20 PROCEDURE — 6370000000 HC RX 637 (ALT 250 FOR IP): Performed by: NURSE PRACTITIONER

## 2024-06-20 PROCEDURE — 82947 ASSAY GLUCOSE BLOOD QUANT: CPT

## 2024-06-20 PROCEDURE — 36556 INSERT NON-TUNNEL CV CATH: CPT

## 2024-06-20 PROCEDURE — 94761 N-INVAS EAR/PLS OXIMETRY MLT: CPT

## 2024-06-20 PROCEDURE — 1200000000 HC SEMI PRIVATE

## 2024-06-20 PROCEDURE — 6360000002 HC RX W HCPCS: Performed by: EMERGENCY MEDICINE

## 2024-06-20 PROCEDURE — 73552 X-RAY EXAM OF FEMUR 2/>: CPT

## 2024-06-20 PROCEDURE — 96374 THER/PROPH/DIAG INJ IV PUSH: CPT

## 2024-06-20 PROCEDURE — 6360000002 HC RX W HCPCS: Performed by: NURSE PRACTITIONER

## 2024-06-20 PROCEDURE — 6360000002 HC RX W HCPCS: Performed by: INTERNAL MEDICINE

## 2024-06-20 PROCEDURE — 80053 COMPREHEN METABOLIC PANEL: CPT

## 2024-06-20 PROCEDURE — 2580000003 HC RX 258: Performed by: NURSE PRACTITIONER

## 2024-06-20 PROCEDURE — 99233 SBSQ HOSP IP/OBS HIGH 50: CPT | Performed by: ORTHOPAEDIC SURGERY

## 2024-06-20 PROCEDURE — 99222 1ST HOSP IP/OBS MODERATE 55: CPT | Performed by: INTERNAL MEDICINE

## 2024-06-20 PROCEDURE — 85025 COMPLETE CBC W/AUTO DIFF WBC: CPT

## 2024-06-20 PROCEDURE — 71045 X-RAY EXAM CHEST 1 VIEW: CPT

## 2024-06-20 PROCEDURE — 99285 EMERGENCY DEPT VISIT HI MDM: CPT

## 2024-06-20 PROCEDURE — 83735 ASSAY OF MAGNESIUM: CPT

## 2024-06-20 PROCEDURE — 85610 PROTHROMBIN TIME: CPT

## 2024-06-20 PROCEDURE — 85730 THROMBOPLASTIN TIME PARTIAL: CPT

## 2024-06-20 PROCEDURE — 93005 ELECTROCARDIOGRAM TRACING: CPT | Performed by: EMERGENCY MEDICINE

## 2024-06-20 PROCEDURE — 70450 CT HEAD/BRAIN W/O DYE: CPT

## 2024-06-20 PROCEDURE — 6360000002 HC RX W HCPCS: Performed by: STUDENT IN AN ORGANIZED HEALTH CARE EDUCATION/TRAINING PROGRAM

## 2024-06-20 RX ORDER — LATANOPROST 50 UG/ML
1 SOLUTION/ DROPS OPHTHALMIC NIGHTLY
Status: DISCONTINUED | OUTPATIENT
Start: 2024-06-20 | End: 2024-06-21

## 2024-06-20 RX ORDER — ONDANSETRON 2 MG/ML
4 INJECTION INTRAMUSCULAR; INTRAVENOUS EVERY 6 HOURS PRN
Status: DISCONTINUED | OUTPATIENT
Start: 2024-06-20 | End: 2024-06-21

## 2024-06-20 RX ORDER — LACTULOSE 10 G/15ML
30 SOLUTION ORAL 2 TIMES DAILY
Status: DISCONTINUED | OUTPATIENT
Start: 2024-06-20 | End: 2024-06-20

## 2024-06-20 RX ORDER — PAROXETINE HYDROCHLORIDE 20 MG/1
20 TABLET, FILM COATED ORAL EVERY MORNING
Status: DISCONTINUED | OUTPATIENT
Start: 2024-06-21 | End: 2024-06-21

## 2024-06-20 RX ORDER — LEVOTHYROXINE SODIUM 0.05 MG/1
50 TABLET ORAL DAILY
Status: DISCONTINUED | OUTPATIENT
Start: 2024-06-20 | End: 2024-06-21

## 2024-06-20 RX ORDER — ACETAMINOPHEN 650 MG/1
650 SUPPOSITORY RECTAL EVERY 6 HOURS PRN
Status: DISCONTINUED | OUTPATIENT
Start: 2024-06-20 | End: 2024-06-21

## 2024-06-20 RX ORDER — FENTANYL CITRATE 0.05 MG/ML
50 INJECTION, SOLUTION INTRAMUSCULAR; INTRAVENOUS ONCE
Status: COMPLETED | OUTPATIENT
Start: 2024-06-20 | End: 2024-06-20

## 2024-06-20 RX ORDER — SODIUM CHLORIDE 0.9 % (FLUSH) 0.9 %
5-40 SYRINGE (ML) INJECTION EVERY 12 HOURS SCHEDULED
Status: DISCONTINUED | OUTPATIENT
Start: 2024-06-20 | End: 2024-06-21

## 2024-06-20 RX ORDER — RISPERIDONE 0.25 MG/1
0.5 TABLET ORAL DAILY
Status: DISCONTINUED | OUTPATIENT
Start: 2024-06-20 | End: 2024-06-21

## 2024-06-20 RX ORDER — MORPHINE SULFATE 2 MG/ML
2 INJECTION, SOLUTION INTRAMUSCULAR; INTRAVENOUS
Status: DISCONTINUED | OUTPATIENT
Start: 2024-06-20 | End: 2024-06-20

## 2024-06-20 RX ORDER — LEFLUNOMIDE 10 MG/1
20 TABLET ORAL DAILY
Status: DISCONTINUED | OUTPATIENT
Start: 2024-06-20 | End: 2024-06-21

## 2024-06-20 RX ORDER — CARVEDILOL 25 MG/1
25 TABLET ORAL 2 TIMES DAILY WITH MEALS
Status: DISCONTINUED | OUTPATIENT
Start: 2024-06-21 | End: 2024-06-21

## 2024-06-20 RX ORDER — METHYLPREDNISOLONE ACETATE 40 MG/ML
40 INJECTION, SUSPENSION INTRA-ARTICULAR; INTRALESIONAL; INTRAMUSCULAR; SOFT TISSUE ONCE
Status: DISCONTINUED | OUTPATIENT
Start: 2024-06-21 | End: 2024-06-21

## 2024-06-20 RX ORDER — DEXTROSE MONOHYDRATE 100 MG/ML
INJECTION, SOLUTION INTRAVENOUS CONTINUOUS PRN
Status: DISCONTINUED | OUTPATIENT
Start: 2024-06-20 | End: 2024-06-21

## 2024-06-20 RX ORDER — INSULIN LISPRO 100 [IU]/ML
0-4 INJECTION, SOLUTION INTRAVENOUS; SUBCUTANEOUS NIGHTLY
Status: DISCONTINUED | OUTPATIENT
Start: 2024-06-20 | End: 2024-06-21

## 2024-06-20 RX ORDER — ACETAMINOPHEN 325 MG/1
650 TABLET ORAL EVERY 6 HOURS PRN
Status: DISCONTINUED | OUTPATIENT
Start: 2024-06-20 | End: 2024-06-21

## 2024-06-20 RX ORDER — SODIUM CHLORIDE 0.9 % (FLUSH) 0.9 %
10 SYRINGE (ML) INJECTION PRN
Status: DISCONTINUED | OUTPATIENT
Start: 2024-06-20 | End: 2024-06-21

## 2024-06-20 RX ORDER — ALPRAZOLAM 1 MG/1
1 TABLET ORAL 3 TIMES DAILY PRN
Status: DISCONTINUED | OUTPATIENT
Start: 2024-06-20 | End: 2024-06-21

## 2024-06-20 RX ORDER — PANTOPRAZOLE SODIUM 40 MG/1
40 TABLET, DELAYED RELEASE ORAL
Status: DISCONTINUED | OUTPATIENT
Start: 2024-06-21 | End: 2024-06-21

## 2024-06-20 RX ORDER — SODIUM CHLORIDE 9 MG/ML
INJECTION, SOLUTION INTRAVENOUS PRN
Status: DISCONTINUED | OUTPATIENT
Start: 2024-06-20 | End: 2024-06-21

## 2024-06-20 RX ORDER — PREDNISOLONE ACETATE 10 MG/ML
1 SUSPENSION/ DROPS OPHTHALMIC 4 TIMES DAILY
Status: DISCONTINUED | OUTPATIENT
Start: 2024-06-20 | End: 2024-06-21

## 2024-06-20 RX ORDER — LIDOCAINE HYDROCHLORIDE 10 MG/ML
10 INJECTION, SOLUTION EPIDURAL; INFILTRATION; INTRACAUDAL; PERINEURAL ONCE
Status: DISCONTINUED | OUTPATIENT
Start: 2024-06-21 | End: 2024-06-21

## 2024-06-20 RX ORDER — INSULIN LISPRO 100 [IU]/ML
0-8 INJECTION, SOLUTION INTRAVENOUS; SUBCUTANEOUS
Status: DISCONTINUED | OUTPATIENT
Start: 2024-06-20 | End: 2024-06-21

## 2024-06-20 RX ORDER — POLYETHYLENE GLYCOL 3350 17 G/17G
17 POWDER, FOR SOLUTION ORAL DAILY PRN
Status: DISCONTINUED | OUTPATIENT
Start: 2024-06-20 | End: 2024-06-21

## 2024-06-20 RX ORDER — ONDANSETRON 4 MG/1
4 TABLET, ORALLY DISINTEGRATING ORAL EVERY 8 HOURS PRN
Status: DISCONTINUED | OUTPATIENT
Start: 2024-06-20 | End: 2024-06-21

## 2024-06-20 RX ORDER — INSULIN GLARGINE 100 [IU]/ML
25 INJECTION, SOLUTION SUBCUTANEOUS NIGHTLY
Status: DISCONTINUED | OUTPATIENT
Start: 2024-06-20 | End: 2024-06-21

## 2024-06-20 RX ORDER — VITAMIN B COMPLEX
1000 TABLET ORAL DAILY
Status: DISCONTINUED | OUTPATIENT
Start: 2024-06-20 | End: 2024-06-21

## 2024-06-20 RX ORDER — DILTIAZEM HYDROCHLORIDE 180 MG/1
360 CAPSULE, COATED, EXTENDED RELEASE ORAL DAILY
Status: DISCONTINUED | OUTPATIENT
Start: 2024-06-20 | End: 2024-06-21

## 2024-06-20 RX ORDER — CARVEDILOL 12.5 MG/1
12.5 TABLET ORAL 2 TIMES DAILY WITH MEALS
Status: DISCONTINUED | OUTPATIENT
Start: 2024-06-20 | End: 2024-06-20

## 2024-06-20 RX ADMIN — INSULIN GLARGINE 25 UNITS: 100 INJECTION, SOLUTION SUBCUTANEOUS at 20:11

## 2024-06-20 RX ADMIN — LATANOPROST 1 DROP: 50 SOLUTION OPHTHALMIC at 20:12

## 2024-06-20 RX ADMIN — FENTANYL CITRATE 50 MCG: 0.05 INJECTION, SOLUTION INTRAMUSCULAR; INTRAVENOUS at 16:23

## 2024-06-20 RX ADMIN — PREDNISOLONE ACETATE 1 DROP: 10 SUSPENSION/ DROPS OPHTHALMIC at 20:12

## 2024-06-20 RX ADMIN — ACETAMINOPHEN 650 MG: 325 TABLET ORAL at 20:11

## 2024-06-20 RX ADMIN — HYDROMORPHONE HYDROCHLORIDE 0.5 MG: 1 INJECTION, SOLUTION INTRAMUSCULAR; INTRAVENOUS; SUBCUTANEOUS at 13:01

## 2024-06-20 RX ADMIN — SODIUM CHLORIDE, PRESERVATIVE FREE 10 ML: 5 INJECTION INTRAVENOUS at 20:12

## 2024-06-20 RX ADMIN — HYDROMORPHONE HYDROCHLORIDE 0.5 MG: 1 INJECTION, SOLUTION INTRAMUSCULAR; INTRAVENOUS; SUBCUTANEOUS at 18:38

## 2024-06-20 RX ADMIN — HYDROMORPHONE HYDROCHLORIDE 0.5 MG: 1 INJECTION, SOLUTION INTRAMUSCULAR; INTRAVENOUS; SUBCUTANEOUS at 21:47

## 2024-06-20 RX ADMIN — ONDANSETRON 4 MG: 2 INJECTION INTRAMUSCULAR; INTRAVENOUS at 18:19

## 2024-06-20 ASSESSMENT — PAIN SCALES - GENERAL
PAINLEVEL_OUTOF10: 10
PAINLEVEL_OUTOF10: 10
PAINLEVEL_OUTOF10: 6
PAINLEVEL_OUTOF10: 8
PAINLEVEL_OUTOF10: 10
PAINLEVEL_OUTOF10: 3
PAINLEVEL_OUTOF10: 10
PAINLEVEL_OUTOF10: 10

## 2024-06-20 ASSESSMENT — PAIN DESCRIPTION - DESCRIPTORS
DESCRIPTORS: ACHING;SHARP

## 2024-06-20 ASSESSMENT — PAIN DESCRIPTION - PAIN TYPE
TYPE: ACUTE PAIN
TYPE: ACUTE PAIN

## 2024-06-20 ASSESSMENT — PAIN - FUNCTIONAL ASSESSMENT
PAIN_FUNCTIONAL_ASSESSMENT: 0-10
PAIN_FUNCTIONAL_ASSESSMENT: PREVENTS OR INTERFERES WITH ALL ACTIVE AND SOME PASSIVE ACTIVITIES
PAIN_FUNCTIONAL_ASSESSMENT: PREVENTS OR INTERFERES WITH ALL ACTIVE AND SOME PASSIVE ACTIVITIES

## 2024-06-20 ASSESSMENT — PAIN DESCRIPTION - ORIENTATION
ORIENTATION: RIGHT

## 2024-06-20 ASSESSMENT — PAIN DESCRIPTION - LOCATION
LOCATION: HIP

## 2024-06-20 NOTE — CONSULTS
Reason for Consult:  End stage renal disease.    Requesting Physician:  Vaibhav Hilliard DO    Assessment:  End stage renal disease.  Hyponatremia.  Hyperphosphatemia.  Hypertension.  R hip fracture s/p fall.    Plan:  Will request IR to replace her HD catheter.  We will follow phosphorus level and start binders as needed.  We will follow H&H and start erythropoeitin stimulating agent as needed.  Acid Base status stable and at target.  K at target.  Monitor BP.  Avoid Lovenox.  We will continue dialysis on MWF schedule.  Place on renal diet with 1000 ml oral fluid restriction when allowed to eat.  We will follow up chemistries daily in AM.    Thank you for the consultation. Please do not hesitate to contact us for any further questions/concerns. We will continue to follow along with you.     HISTORY OF PRESENT ILLNESS:    The patient is a 73 y.o. female who normally undergoes dialysis at [facility] dialysis in [place] on MWF under our care admitted with right hip fracture after a fall. She had an office appointment and lost her balance as she was stepping out of her car onto the curb. She is a resident of a nursing home. Her last outpatient hemodialysis was on Wednesday. Today her potassium level is 4.2.    Review Of Systems:   Constitutional: No fever, chills, lethargy, weakness or wt loss.  HEENT:  No headache, nasal discharge or sore throat.  Cardiac:  No chest pain, dyspnea, orthopnea or PND.  Chest:   No cough, phlegm or wheezing.  Abdomen:  No abdominal pain, nausea, vomiting or diarrhea.  Neuro:  No gross focal weakness, numbness, abnormal movements or seizure like activity.  Skin:   No rashes or itching.  :   No hematuria, pyuria, dysuria or flank pain.  Extremities:  No swelling or joint pains.  Endocrine: No polyuria, polydypsia, or thyroid problems.  Hematology:    No bleeding disorders or bruising.  All other ROS is negative.       Past Medical History:   Diagnosis Date    Anxiety     Asthma

## 2024-06-20 NOTE — ED PROVIDER NOTES
[facility]  EMERGENCY MEDICINE     Pt Name: Allegra Meza  MRN: [id number]  Birthdate 1950  Date of evaluation: 2024  PCP:    Solomon Woodard MD  Provider: Davey Vila DO    CHIEF COMPLAINT       Chief Complaint   Patient presents with    Fall    Hip Pain     right       HISTORY OF PRESENT ILLNESS    Patient is 73-year-old female who presents secondary to a fall.  Patient states she fell onto her right hip after getting out of the car.  Patient family states she apparently lost her balance while stepping up a curb.  Patient has history of CAD, A-fib on Eliquis.  Patient did hit her head.  She is complaining mostly of right hip pain.  Patient states inability to move her right hip.  Patient can move her right foot and has no numbness or tingling in the right foot.         Triage notes and Nursing notes were reviewed by myself.  Any discrepancies are addressed above.    PAST MEDICAL HISTORY     Past Medical History:   Diagnosis Date    Anxiety     Asthma     Atrial fibrillation (HCC)     CAD (coronary artery disease)     CHF (congestive heart failure) (HCC)     COPD (chronic obstructive pulmonary disease) (HCC)     DM (diabetes mellitus), type 2 (HCC)     ESRD on dialysis (HCC)     Fibromyalgia     Fibromyalgia     Hearing loss     Heart disease     Herpes simplex virus (HSV) infection     History of blood transfusion     Hypertension     Kidney failure     LVH (left ventricular hypertrophy)     Mitral valve regurgitation     On home O2     Osteoporosis     PAD (peripheral artery disease) (HCC)     Rheumatoid arthritis (HCC)        SURGICAL HISTORY       Past Surgical History:   Procedure Laterality Date    AV FISTULA REPAIR      x 2    BLADDER REPAIR       SECTION       SECTION       SECTION      CORONARY ANGIOPLASTY WITH STENT PLACEMENT      HYSTERECTOMY (CERVIX STATUS UNKNOWN)      w/o bso    JOINT REPLACEMENT      LITHOTRIPSY      PACEMAKER PLACEMENT

## 2024-06-20 NOTE — H&P
Rheumatoid arthritis (HCC) 6/20/2024 Yes    Peripheral vascular disorder due to diabetes mellitus (HCC) 6/20/2024 Yes    Obstructive sleep apnea syndrome 6/20/2024 Yes    Primary hypertension 6/20/2024 Yes    End stage renal failure on dialysis ([facility]) 6/20/2024 Yes       Plan:     Patient status inpatient in the Med/Surge    Ortho consult for hip fracture  Renal consulted for dialysis-due tomorrow  Pain control  Hold eliquis in anticipation of surgery-?Saturday  Resume other home meds  Monitor/control glucose  D/w     Consultations:   IP CONSULT TO ORTHOPEDIC SURGERY  IP CONSULT TO HOSPITALIST  IP CONSULT TO NEPHROLOGY     Patient is admitted as inpatient status because of co-morbidities listed above, severity of signs and symptoms as outlined, requirement for current medical therapies and most importantly because of direct risk to patient if care not provided in a hospital setting.  Expected length of stay > 48 hours.    Vaibhav Hilliard DO  6/20/2024  4:39 PM    Copy sent to Solomon Maya MD

## 2024-06-20 NOTE — ED NOTES
ED to inpatient nurses report     Chief Complaint   Patient presents with    Fall    Hip Pain     right      Present to ED from doctor's appt per EMS  LOC: alert and orientated to name and place  Vital signs   Vitals:    06/20/24 1224   BP: (!) 137/90   Pulse: 95   Resp: 16   Temp: 97.9 °F (36.6 °C)   SpO2: 97%   Weight: 61.2 kg (135 lb)   Height: 1.702 m (5' 7\")      Oxygen Baseline     Current needs required    SEPSIS:   [] Lactate X 2 ordered (Yes or No)  [] Antibiotics given (Yes or No)  [] IV Fluids ordered (Yes or No)             [] 2nd IV completed (Yes or No)  [] Hourly Vital Signs (Validated)  [] Outstanding Orders:     LDAs:   Peripheral IV 06/20/24 Left Forearm (Active)   Site Assessment Clean, dry & intact 06/20/24 1247   Line Status Blood return noted 06/20/24 1247     Mobility: Independent  Fall Risk:    Pending ED orders:   Present condition:   Code Status:   Consults: IP CONSULT TO ORTHOPEDIC SURGERY  IP CONSULT TO HOSPITALIST  IP CONSULT TO NEPHROLOGY  []  Hospitalist  Completed  [] yes [] no Who:   []  Medicine  Completed  [] yes [] No Who:   []  Cardiology  Completed  [] yes [] No Who:   []  GI   Completed  [] yes [] No Who:   []  Neurology  Completed  [] yes [] No Who:   []  Nephrology Completed  [] yes [] No Who:    []  Vascular  Completed  [] yes [] No Who:   []  Ortho  Completed  [] yes [] No Who:     []  Surgery  Completed  [] yes [] No Who:    []  Urology  Completed  [] yes [] No Who:    []  CT Surgery Completed  [] yes [] No Who:   []  Podiatry  Completed  [] yes [] No Who:    []  Other    Completed  [] yes [] No Who:  Interventions: dilaudid  Important Events:         Electronically signed by LAURI MARTIN RN on 6/20/2024 at 4:00 PM

## 2024-06-21 ENCOUNTER — APPOINTMENT (OUTPATIENT)
Dept: INTERVENTIONAL RADIOLOGY/VASCULAR | Age: 74
DRG: 480 | End: 2024-06-21
Payer: MEDICARE

## 2024-06-21 ENCOUNTER — ANESTHESIA EVENT (OUTPATIENT)
Dept: OPERATING ROOM | Age: 74
End: 2024-06-21
Payer: MEDICARE

## 2024-06-21 ENCOUNTER — ANESTHESIA (OUTPATIENT)
Dept: OPERATING ROOM | Age: 74
End: 2024-06-21
Payer: MEDICARE

## 2024-06-21 ENCOUNTER — APPOINTMENT (OUTPATIENT)
Dept: GENERAL RADIOLOGY | Age: 74
DRG: 480 | End: 2024-06-21
Payer: MEDICARE

## 2024-06-21 LAB
ABO + RH BLD: NORMAL
ANION GAP SERPL CALCULATED.3IONS-SCNC: 16 MMOL/L (ref 9–17)
ARM BAND NUMBER: NORMAL
BASOPHILS # BLD: 0.07 K/UL (ref 0–0.2)
BASOPHILS NFR BLD: 1 % (ref 0–2)
BLOOD BANK SAMPLE EXPIRATION: NORMAL
BLOOD GROUP ANTIBODIES SERPL: NEGATIVE
BUN SERPL-MCNC: 57 MG/DL (ref 8–23)
BUN/CREAT SERPL: 9 (ref 9–20)
CALCIUM SERPL-MCNC: 9 MG/DL (ref 8.6–10.4)
CHLORIDE SERPL-SCNC: 91 MMOL/L (ref 98–107)
CO2 SERPL-SCNC: 25 MMOL/L (ref 20–31)
CREAT SERPL-MCNC: 6.2 MG/DL (ref 0.5–0.9)
EKG ATRIAL RATE: 122 BPM
EKG P AXIS: 126 DEGREES
EKG Q-T INTERVAL: 510 MS
EKG QRS DURATION: 200 MS
EKG QTC CALCULATION (BAZETT): 561 MS
EKG R AXIS: -79 DEGREES
EKG T AXIS: 101 DEGREES
EKG VENTRICULAR RATE: 73 BPM
EOSINOPHIL # BLD: 0.16 K/UL (ref 0–0.44)
EOSINOPHILS RELATIVE PERCENT: 2 % (ref 1–4)
ERYTHROCYTE [DISTWIDTH] IN BLOOD BY AUTOMATED COUNT: 15.2 % (ref 11.8–14.4)
GFR, ESTIMATED: 7 ML/MIN/1.73M2
GLUCOSE BLD-MCNC: 134 MG/DL (ref 65–105)
GLUCOSE BLD-MCNC: 142 MG/DL (ref 65–105)
GLUCOSE SERPL-MCNC: 142 MG/DL (ref 70–99)
HCT VFR BLD AUTO: 31.1 % (ref 36.3–47.1)
HGB BLD-MCNC: 9.8 G/DL (ref 11.9–15.1)
IMM GRANULOCYTES # BLD AUTO: 0.03 K/UL (ref 0–0.3)
IMM GRANULOCYTES NFR BLD: 0 %
LYMPHOCYTES NFR BLD: 1.07 K/UL (ref 1.1–3.7)
LYMPHOCYTES RELATIVE PERCENT: 15 % (ref 24–43)
MCH RBC QN AUTO: 31.5 PG (ref 25.2–33.5)
MCHC RBC AUTO-ENTMCNC: 31.5 G/DL (ref 28.4–34.8)
MCV RBC AUTO: 100 FL (ref 82.6–102.9)
MONOCYTES NFR BLD: 0.77 K/UL (ref 0.1–1.2)
MONOCYTES NFR BLD: 11 % (ref 3–12)
NEUTROPHILS NFR BLD: 70 % (ref 36–65)
NEUTS SEG NFR BLD: 4.92 K/UL (ref 1.5–8.1)
NRBC BLD-RTO: 0 PER 100 WBC
PLATELET # BLD AUTO: 157 K/UL (ref 138–453)
PMV BLD AUTO: 10.8 FL (ref 8.1–13.5)
POTASSIUM SERPL-SCNC: 4.5 MMOL/L (ref 3.7–5.3)
RBC # BLD AUTO: 3.11 M/UL (ref 3.95–5.11)
RBC # BLD: ABNORMAL 10*6/UL
SODIUM SERPL-SCNC: 132 MMOL/L (ref 135–144)
WBC OTHER # BLD: 7 K/UL (ref 3.5–11.3)

## 2024-06-21 PROCEDURE — 2720000010 HC SURG SUPPLY STERILE: Performed by: ORTHOPAEDIC SURGERY

## 2024-06-21 PROCEDURE — 6360000002 HC RX W HCPCS: Performed by: ANESTHESIOLOGY

## 2024-06-21 PROCEDURE — 3700000000 HC ANESTHESIA ATTENDED CARE: Performed by: ORTHOPAEDIC SURGERY

## 2024-06-21 PROCEDURE — 36598 INJ W/FLUOR EVAL CV DEVICE: CPT

## 2024-06-21 PROCEDURE — 2580000003 HC RX 258: Performed by: NURSE PRACTITIONER

## 2024-06-21 PROCEDURE — C1769 GUIDE WIRE: HCPCS | Performed by: ORTHOPAEDIC SURGERY

## 2024-06-21 PROCEDURE — 5A1D70Z PERFORMANCE OF URINARY FILTRATION, INTERMITTENT, LESS THAN 6 HOURS PER DAY: ICD-10-PCS | Performed by: INTERNAL MEDICINE

## 2024-06-21 PROCEDURE — C1750 CATH, HEMODIALYSIS,LONG-TERM: HCPCS

## 2024-06-21 PROCEDURE — 86901 BLOOD TYPING SEROLOGIC RH(D): CPT

## 2024-06-21 PROCEDURE — 6360000002 HC RX W HCPCS: Performed by: RADIOLOGY

## 2024-06-21 PROCEDURE — 3600000012 HC SURGERY LEVEL 2 ADDTL 15MIN: Performed by: ORTHOPAEDIC SURGERY

## 2024-06-21 PROCEDURE — 36581 REPLACE TUNNELED CV CATH: CPT

## 2024-06-21 PROCEDURE — 6370000000 HC RX 637 (ALT 250 FOR IP): Performed by: INTERNAL MEDICINE

## 2024-06-21 PROCEDURE — 82947 ASSAY GLUCOSE BLOOD QUANT: CPT

## 2024-06-21 PROCEDURE — 6360000002 HC RX W HCPCS: Performed by: INTERNAL MEDICINE

## 2024-06-21 PROCEDURE — 86850 RBC ANTIBODY SCREEN: CPT

## 2024-06-21 PROCEDURE — 73552 X-RAY EXAM OF FEMUR 2/>: CPT

## 2024-06-21 PROCEDURE — 36415 COLL VENOUS BLD VENIPUNCTURE: CPT

## 2024-06-21 PROCEDURE — 85025 COMPLETE CBC W/AUTO DIFF WBC: CPT

## 2024-06-21 PROCEDURE — 0J2TXYZ CHANGE OTHER DEVICE IN TRUNK SUBCUTANEOUS TISSUE AND FASCIA, EXTERNAL APPROACH: ICD-10-PCS | Performed by: RADIOLOGY

## 2024-06-21 PROCEDURE — 3600000002 HC SURGERY LEVEL 2 BASE: Performed by: ORTHOPAEDIC SURGERY

## 2024-06-21 PROCEDURE — 6360000004 HC RX CONTRAST MEDICATION: Performed by: RADIOLOGY

## 2024-06-21 PROCEDURE — C1713 ANCHOR/SCREW BN/BN,TIS/BN: HCPCS | Performed by: ORTHOPAEDIC SURGERY

## 2024-06-21 PROCEDURE — 1200000000 HC SEMI PRIVATE

## 2024-06-21 PROCEDURE — 6370000000 HC RX 637 (ALT 250 FOR IP): Performed by: NURSE PRACTITIONER

## 2024-06-21 PROCEDURE — 90935 HEMODIALYSIS ONE EVALUATION: CPT

## 2024-06-21 PROCEDURE — 86900 BLOOD TYPING SEROLOGIC ABO: CPT

## 2024-06-21 PROCEDURE — 2780000010 HC IMPLANT OTHER: Performed by: ORTHOPAEDIC SURGERY

## 2024-06-21 PROCEDURE — 77001 FLUOROGUIDE FOR VEIN DEVICE: CPT

## 2024-06-21 PROCEDURE — 99232 SBSQ HOSP IP/OBS MODERATE 35: CPT | Performed by: INTERNAL MEDICINE

## 2024-06-21 PROCEDURE — 2500000003 HC RX 250 WO HCPCS: Performed by: INTERNAL MEDICINE

## 2024-06-21 PROCEDURE — 2580000003 HC RX 258: Performed by: RADIOLOGY

## 2024-06-21 PROCEDURE — 27245 TREAT THIGH FRACTURE: CPT | Performed by: ORTHOPAEDIC SURGERY

## 2024-06-21 PROCEDURE — 80048 BASIC METABOLIC PNL TOTAL CA: CPT

## 2024-06-21 PROCEDURE — 7100000001 HC PACU RECOVERY - ADDTL 15 MIN: Performed by: ORTHOPAEDIC SURGERY

## 2024-06-21 PROCEDURE — 2709999900 HC NON-CHARGEABLE SUPPLY: Performed by: ORTHOPAEDIC SURGERY

## 2024-06-21 PROCEDURE — 3700000001 HC ADD 15 MINUTES (ANESTHESIA): Performed by: ORTHOPAEDIC SURGERY

## 2024-06-21 PROCEDURE — 6370000000 HC RX 637 (ALT 250 FOR IP)

## 2024-06-21 PROCEDURE — 0QS636Z REPOSITION RIGHT UPPER FEMUR WITH INTRAMEDULLARY INTERNAL FIXATION DEVICE, PERCUTANEOUS APPROACH: ICD-10-PCS | Performed by: ORTHOPAEDIC SURGERY

## 2024-06-21 PROCEDURE — 2580000003 HC RX 258: Performed by: ANESTHESIOLOGY

## 2024-06-21 PROCEDURE — 6360000002 HC RX W HCPCS

## 2024-06-21 PROCEDURE — 2500000003 HC RX 250 WO HCPCS: Performed by: ANESTHESIOLOGY

## 2024-06-21 PROCEDURE — 7100000000 HC PACU RECOVERY - FIRST 15 MIN: Performed by: ORTHOPAEDIC SURGERY

## 2024-06-21 DEVICE — TFNA FENESTRATED HELICAL BLADE 90MM - STERILE
Type: IMPLANTABLE DEVICE | Site: HIP | Status: FUNCTIONAL
Brand: TFN-ADVANCE

## 2024-06-21 DEVICE — LOCKING SCREW FOR IM NAIL Ø 5MM/ 36MM/ XL25/ STERILE: Type: IMPLANTABLE DEVICE | Site: HIP | Status: FUNCTIONAL

## 2024-06-21 DEVICE — 12MM/125 DEG TI CANN TFNA 170MM - STERILE
Type: IMPLANTABLE DEVICE | Site: HIP | Status: FUNCTIONAL
Brand: TFN-ADVANCE

## 2024-06-21 RX ORDER — DEXTROSE MONOHYDRATE 100 MG/ML
INJECTION, SOLUTION INTRAVENOUS CONTINUOUS PRN
Status: DISCONTINUED | OUTPATIENT
Start: 2024-06-21 | End: 2024-06-24 | Stop reason: HOSPADM

## 2024-06-21 RX ORDER — ACYCLOVIR 200 MG/1
200 CAPSULE ORAL 2 TIMES DAILY
Status: DISCONTINUED | OUTPATIENT
Start: 2024-06-21 | End: 2024-06-24 | Stop reason: HOSPADM

## 2024-06-21 RX ORDER — SODIUM CHLORIDE 9 MG/ML
INJECTION, SOLUTION INTRAVENOUS CONTINUOUS PRN
Status: DISCONTINUED | OUTPATIENT
Start: 2024-06-21 | End: 2024-06-21 | Stop reason: SDUPTHER

## 2024-06-21 RX ORDER — ONDANSETRON 2 MG/ML
4 INJECTION INTRAMUSCULAR; INTRAVENOUS EVERY 6 HOURS PRN
Status: DISCONTINUED | OUTPATIENT
Start: 2024-06-21 | End: 2024-06-24 | Stop reason: HOSPADM

## 2024-06-21 RX ORDER — SERTRALINE HYDROCHLORIDE 25 MG/1
25 TABLET, FILM COATED ORAL DAILY
Status: DISCONTINUED | OUTPATIENT
Start: 2024-06-21 | End: 2024-06-24 | Stop reason: HOSPADM

## 2024-06-21 RX ORDER — CLINDAMYCIN PHOSPHATE 900 MG/50ML
INJECTION, SOLUTION INTRAVENOUS PRN
Status: DISCONTINUED | OUTPATIENT
Start: 2024-06-21 | End: 2024-06-21 | Stop reason: SDUPTHER

## 2024-06-21 RX ORDER — METOCLOPRAMIDE HYDROCHLORIDE 5 MG/ML
10 INJECTION INTRAMUSCULAR; INTRAVENOUS
Status: DISCONTINUED | OUTPATIENT
Start: 2024-06-21 | End: 2024-06-21

## 2024-06-21 RX ORDER — HYDROMORPHONE HYDROCHLORIDE 1 MG/ML
1 INJECTION, SOLUTION INTRAMUSCULAR; INTRAVENOUS; SUBCUTANEOUS
Status: DISCONTINUED | OUTPATIENT
Start: 2024-06-21 | End: 2024-06-21

## 2024-06-21 RX ORDER — PANTOPRAZOLE SODIUM 40 MG/1
40 TABLET, DELAYED RELEASE ORAL
Status: DISCONTINUED | OUTPATIENT
Start: 2024-06-22 | End: 2024-06-24 | Stop reason: HOSPADM

## 2024-06-21 RX ORDER — SODIUM CHLORIDE 9 MG/ML
INJECTION, SOLUTION INTRAVENOUS PRN
Status: DISCONTINUED | OUTPATIENT
Start: 2024-06-21 | End: 2024-06-21

## 2024-06-21 RX ORDER — PREDNISOLONE ACETATE 10 MG/ML
1 SUSPENSION/ DROPS OPHTHALMIC 3 TIMES DAILY
Status: DISCONTINUED | OUTPATIENT
Start: 2024-06-21 | End: 2024-06-24 | Stop reason: HOSPADM

## 2024-06-21 RX ORDER — INSULIN LISPRO 100 [IU]/ML
0-4 INJECTION, SOLUTION INTRAVENOUS; SUBCUTANEOUS NIGHTLY
Status: DISCONTINUED | OUTPATIENT
Start: 2024-06-21 | End: 2024-06-24 | Stop reason: HOSPADM

## 2024-06-21 RX ORDER — VITAMIN B COMPLEX
1000 TABLET ORAL DAILY
Status: DISCONTINUED | OUTPATIENT
Start: 2024-06-21 | End: 2024-06-24 | Stop reason: HOSPADM

## 2024-06-21 RX ORDER — ACETAMINOPHEN 325 MG/1
650 TABLET ORAL EVERY 6 HOURS PRN
Status: DISCONTINUED | OUTPATIENT
Start: 2024-06-21 | End: 2024-06-24

## 2024-06-21 RX ORDER — FENTANYL CITRATE 50 UG/ML
INJECTION, SOLUTION INTRAMUSCULAR; INTRAVENOUS PRN
Status: DISCONTINUED | OUTPATIENT
Start: 2024-06-21 | End: 2024-06-21 | Stop reason: SDUPTHER

## 2024-06-21 RX ORDER — LIDOCAINE HYDROCHLORIDE 20 MG/ML
INJECTION, SOLUTION EPIDURAL; INFILTRATION; INTRACAUDAL; PERINEURAL PRN
Status: DISCONTINUED | OUTPATIENT
Start: 2024-06-21 | End: 2024-06-21 | Stop reason: SDUPTHER

## 2024-06-21 RX ORDER — POLYETHYLENE GLYCOL 3350 17 G/17G
17 POWDER, FOR SOLUTION ORAL DAILY PRN
COMMUNITY

## 2024-06-21 RX ORDER — CLINDAMYCIN PHOSPHATE 900 MG/50ML
900 INJECTION, SOLUTION INTRAVENOUS EVERY 8 HOURS
Status: DISCONTINUED | OUTPATIENT
Start: 2024-06-22 | End: 2024-06-21

## 2024-06-21 RX ORDER — PHENYLEPHRINE HCL IN 0.9% NACL 1 MG/10 ML
SYRINGE (ML) INTRAVENOUS PRN
Status: DISCONTINUED | OUTPATIENT
Start: 2024-06-21 | End: 2024-06-21 | Stop reason: SDUPTHER

## 2024-06-21 RX ORDER — CARVEDILOL 25 MG/1
25 TABLET ORAL 2 TIMES DAILY WITH MEALS
Status: DISCONTINUED | OUTPATIENT
Start: 2024-06-21 | End: 2024-06-24 | Stop reason: HOSPADM

## 2024-06-21 RX ORDER — HYDROMORPHONE HYDROCHLORIDE 1 MG/ML
1 INJECTION, SOLUTION INTRAMUSCULAR; INTRAVENOUS; SUBCUTANEOUS
Status: DISCONTINUED | OUTPATIENT
Start: 2024-06-21 | End: 2024-06-23

## 2024-06-21 RX ORDER — HEPARIN SODIUM 1000 [USP'U]/ML
INJECTION, SOLUTION INTRAVENOUS; SUBCUTANEOUS PRN
Status: DISCONTINUED | OUTPATIENT
Start: 2024-06-21 | End: 2024-06-21

## 2024-06-21 RX ORDER — LATANOPROST 50 UG/ML
1 SOLUTION/ DROPS OPHTHALMIC NIGHTLY
Status: DISCONTINUED | OUTPATIENT
Start: 2024-06-21 | End: 2024-06-24 | Stop reason: HOSPADM

## 2024-06-21 RX ORDER — ONDANSETRON 4 MG/1
4 TABLET, ORALLY DISINTEGRATING ORAL EVERY 8 HOURS PRN
Status: DISCONTINUED | OUTPATIENT
Start: 2024-06-21 | End: 2024-06-24 | Stop reason: HOSPADM

## 2024-06-21 RX ORDER — ACETAMINOPHEN 500 MG
500 TABLET ORAL EVERY 4 HOURS PRN
Status: ON HOLD | COMMUNITY
End: 2024-06-24

## 2024-06-21 RX ORDER — ONDANSETRON 2 MG/ML
INJECTION INTRAMUSCULAR; INTRAVENOUS PRN
Status: DISCONTINUED | OUTPATIENT
Start: 2024-06-21 | End: 2024-06-21 | Stop reason: SDUPTHER

## 2024-06-21 RX ORDER — INSULIN LISPRO 100 [IU]/ML
0-8 INJECTION, SOLUTION INTRAVENOUS; SUBCUTANEOUS
Status: DISCONTINUED | OUTPATIENT
Start: 2024-06-22 | End: 2024-06-24 | Stop reason: HOSPADM

## 2024-06-21 RX ORDER — NALOXONE HYDROCHLORIDE 0.4 MG/ML
INJECTION, SOLUTION INTRAMUSCULAR; INTRAVENOUS; SUBCUTANEOUS PRN
Status: DISCONTINUED | OUTPATIENT
Start: 2024-06-21 | End: 2024-06-21

## 2024-06-21 RX ORDER — PROPOFOL 10 MG/ML
INJECTION, EMULSION INTRAVENOUS PRN
Status: DISCONTINUED | OUTPATIENT
Start: 2024-06-21 | End: 2024-06-21 | Stop reason: SDUPTHER

## 2024-06-21 RX ORDER — LEFLUNOMIDE 10 MG/1
20 TABLET ORAL DAILY
Status: DISCONTINUED | OUTPATIENT
Start: 2024-06-22 | End: 2024-06-24 | Stop reason: HOSPADM

## 2024-06-21 RX ORDER — LOPERAMIDE HYDROCHLORIDE 2 MG/1
2 CAPSULE ORAL
Status: DISCONTINUED | OUTPATIENT
Start: 2024-06-21 | End: 2024-06-24 | Stop reason: HOSPADM

## 2024-06-21 RX ORDER — DILTIAZEM HYDROCHLORIDE 180 MG/1
360 CAPSULE, COATED, EXTENDED RELEASE ORAL DAILY
Status: DISCONTINUED | OUTPATIENT
Start: 2024-06-22 | End: 2024-06-24 | Stop reason: HOSPADM

## 2024-06-21 RX ORDER — ROCURONIUM BROMIDE 10 MG/ML
INJECTION, SOLUTION INTRAVENOUS PRN
Status: DISCONTINUED | OUTPATIENT
Start: 2024-06-21 | End: 2024-06-21 | Stop reason: SDUPTHER

## 2024-06-21 RX ORDER — OXYCODONE HYDROCHLORIDE 5 MG/1
5 TABLET ORAL
Status: DISCONTINUED | OUTPATIENT
Start: 2024-06-21 | End: 2024-06-21

## 2024-06-21 RX ORDER — SODIUM CHLORIDE 0.9 % (FLUSH) 0.9 %
5-40 SYRINGE (ML) INJECTION EVERY 12 HOURS SCHEDULED
Status: DISCONTINUED | OUTPATIENT
Start: 2024-06-21 | End: 2024-06-21

## 2024-06-21 RX ORDER — HEPARIN SODIUM 1000 [USP'U]/ML
INJECTION, SOLUTION INTRAVENOUS; SUBCUTANEOUS PRN
Status: COMPLETED | OUTPATIENT
Start: 2024-06-21 | End: 2024-06-21

## 2024-06-21 RX ORDER — INSULIN GLARGINE 100 [IU]/ML
15 INJECTION, SOLUTION SUBCUTANEOUS NIGHTLY
Status: DISCONTINUED | OUTPATIENT
Start: 2024-06-21 | End: 2024-06-24 | Stop reason: HOSPADM

## 2024-06-21 RX ORDER — INSULIN GLARGINE 100 [IU]/ML
25 INJECTION, SOLUTION SUBCUTANEOUS NIGHTLY
Status: DISCONTINUED | OUTPATIENT
Start: 2024-06-21 | End: 2024-06-21

## 2024-06-21 RX ORDER — SERTRALINE HYDROCHLORIDE 25 MG/1
25 TABLET, FILM COATED ORAL DAILY
COMMUNITY

## 2024-06-21 RX ORDER — ACETAMINOPHEN 650 MG/1
650 SUPPOSITORY RECTAL EVERY 6 HOURS PRN
Status: DISCONTINUED | OUTPATIENT
Start: 2024-06-21 | End: 2024-06-24

## 2024-06-21 RX ORDER — LEVOTHYROXINE SODIUM 0.05 MG/1
50 TABLET ORAL DAILY
Status: DISCONTINUED | OUTPATIENT
Start: 2024-06-22 | End: 2024-06-24 | Stop reason: HOSPADM

## 2024-06-21 RX ORDER — HYDROMORPHONE HYDROCHLORIDE 1 MG/ML
0.5 INJECTION, SOLUTION INTRAMUSCULAR; INTRAVENOUS; SUBCUTANEOUS EVERY 5 MIN PRN
Status: DISCONTINUED | OUTPATIENT
Start: 2024-06-21 | End: 2024-06-21

## 2024-06-21 RX ORDER — SODIUM CHLORIDE 0.9 % (FLUSH) 0.9 %
5-40 SYRINGE (ML) INJECTION PRN
Status: DISCONTINUED | OUTPATIENT
Start: 2024-06-21 | End: 2024-06-21

## 2024-06-21 RX ORDER — FENTANYL CITRATE 50 UG/ML
25 INJECTION, SOLUTION INTRAMUSCULAR; INTRAVENOUS EVERY 5 MIN PRN
Status: DISCONTINUED | OUTPATIENT
Start: 2024-06-21 | End: 2024-06-21

## 2024-06-21 RX ORDER — LOPERAMIDE HYDROCHLORIDE 2 MG/1
2 TABLET ORAL
COMMUNITY

## 2024-06-21 RX ORDER — DEXAMETHASONE SODIUM PHOSPHATE 10 MG/ML
INJECTION, SOLUTION INTRAMUSCULAR; INTRAVENOUS PRN
Status: DISCONTINUED | OUTPATIENT
Start: 2024-06-21 | End: 2024-06-21 | Stop reason: SDUPTHER

## 2024-06-21 RX ORDER — ALPRAZOLAM 1 MG/1
1 TABLET ORAL 3 TIMES DAILY PRN
Status: DISCONTINUED | OUTPATIENT
Start: 2024-06-21 | End: 2024-06-24 | Stop reason: HOSPADM

## 2024-06-21 RX ORDER — PROCHLORPERAZINE EDISYLATE 5 MG/ML
10 INJECTION INTRAMUSCULAR; INTRAVENOUS
Status: DISCONTINUED | OUTPATIENT
Start: 2024-06-21 | End: 2024-06-21

## 2024-06-21 RX ORDER — HYDROPHILIC CREAM
PASTE (GRAM) TOPICAL 2 TIMES DAILY
COMMUNITY

## 2024-06-21 RX ORDER — DIPHENHYDRAMINE HYDROCHLORIDE 50 MG/ML
12.5 INJECTION INTRAMUSCULAR; INTRAVENOUS
Status: DISCONTINUED | OUTPATIENT
Start: 2024-06-21 | End: 2024-06-21

## 2024-06-21 RX ADMIN — HYDROMORPHONE HYDROCHLORIDE 0.5 MG: 1 INJECTION, SOLUTION INTRAMUSCULAR; INTRAVENOUS; SUBCUTANEOUS at 19:01

## 2024-06-21 RX ADMIN — LEVOTHYROXINE SODIUM 50 MCG: 0.05 TABLET ORAL at 07:00

## 2024-06-21 RX ADMIN — ACYCLOVIR 200 MG: 200 CAPSULE ORAL at 20:54

## 2024-06-21 RX ADMIN — ONDANSETRON 4 MG: 2 INJECTION INTRAMUSCULAR; INTRAVENOUS at 18:13

## 2024-06-21 RX ADMIN — CLINDAMYCIN PHOSPHATE 900 MG: 900 INJECTION, SOLUTION INTRAVENOUS at 16:58

## 2024-06-21 RX ADMIN — ROCURONIUM BROMIDE 50 MG: 10 INJECTION, SOLUTION INTRAVENOUS at 16:49

## 2024-06-21 RX ADMIN — HYDROMORPHONE HYDROCHLORIDE 0.5 MG: 1 INJECTION, SOLUTION INTRAMUSCULAR; INTRAVENOUS; SUBCUTANEOUS at 08:19

## 2024-06-21 RX ADMIN — CEFAZOLIN 1000 MG: 1 INJECTION, POWDER, FOR SOLUTION INTRAMUSCULAR; INTRAVENOUS at 11:30

## 2024-06-21 RX ADMIN — HEPARIN SODIUM 3100 UNITS: 1000 INJECTION INTRAVENOUS; SUBCUTANEOUS at 12:06

## 2024-06-21 RX ADMIN — DILTIAZEM HYDROCHLORIDE 360 MG: 180 CAPSULE, COATED, EXTENDED RELEASE ORAL at 09:14

## 2024-06-21 RX ADMIN — HEPARIN SODIUM 3100 UNITS: 1000 INJECTION INTRAVENOUS; SUBCUTANEOUS at 12:10

## 2024-06-21 RX ADMIN — INSULIN GLARGINE 15 UNITS: 100 INJECTION, SOLUTION SUBCUTANEOUS at 20:47

## 2024-06-21 RX ADMIN — SUGAMMADEX 200 MG: 100 INJECTION, SOLUTION INTRAVENOUS at 18:30

## 2024-06-21 RX ADMIN — DEXAMETHASONE SODIUM PHOSPHATE 10 MG: 10 INJECTION, SOLUTION INTRAMUSCULAR; INTRAVENOUS at 17:11

## 2024-06-21 RX ADMIN — HYDROMORPHONE HYDROCHLORIDE 0.5 MG: 1 INJECTION, SOLUTION INTRAMUSCULAR; INTRAVENOUS; SUBCUTANEOUS at 05:13

## 2024-06-21 RX ADMIN — CARVEDILOL 25 MG: 25 TABLET, FILM COATED ORAL at 09:14

## 2024-06-21 RX ADMIN — Medication 200 MCG: at 17:07

## 2024-06-21 RX ADMIN — Medication 200 MCG: at 17:00

## 2024-06-21 RX ADMIN — PHENYLEPHRINE HYDROCHLORIDE 100 MCG/MIN: 10 INJECTION INTRAVENOUS at 17:25

## 2024-06-21 RX ADMIN — SODIUM CHLORIDE, PRESERVATIVE FREE 10 ML: 5 INJECTION INTRAVENOUS at 08:19

## 2024-06-21 RX ADMIN — LATANOPROST 1 DROP: 50 SOLUTION/ DROPS OPHTHALMIC at 20:47

## 2024-06-21 RX ADMIN — Medication 3.1 ML: at 16:10

## 2024-06-21 RX ADMIN — SERTRALINE 25 MG: 25 TABLET, FILM COATED ORAL at 20:54

## 2024-06-21 RX ADMIN — Medication 200 MCG: at 17:10

## 2024-06-21 RX ADMIN — SODIUM CHLORIDE: 9 INJECTION, SOLUTION INTRAVENOUS at 16:43

## 2024-06-21 RX ADMIN — PANTOPRAZOLE SODIUM 40 MG: 40 TABLET, DELAYED RELEASE ORAL at 07:00

## 2024-06-21 RX ADMIN — LIDOCAINE HYDROCHLORIDE 60 MG: 20 INJECTION, SOLUTION EPIDURAL; INFILTRATION; INTRACAUDAL; PERINEURAL at 16:49

## 2024-06-21 RX ADMIN — PROPOFOL 80 MG: 10 INJECTION, EMULSION INTRAVENOUS at 16:49

## 2024-06-21 RX ADMIN — PREDNISOLONE ACETATE 1 DROP: 10 SUSPENSION/ DROPS OPHTHALMIC at 20:47

## 2024-06-21 RX ADMIN — Medication 200 MCG: at 16:55

## 2024-06-21 RX ADMIN — HYDROMORPHONE HYDROCHLORIDE 1 MG: 1 INJECTION, SOLUTION INTRAMUSCULAR; INTRAVENOUS; SUBCUTANEOUS at 14:30

## 2024-06-21 RX ADMIN — IOPAMIDOL 10 ML: 612 INJECTION, SOLUTION INTRAVENOUS at 12:05

## 2024-06-21 RX ADMIN — Medication 200 MCG: at 17:16

## 2024-06-21 RX ADMIN — HYDROMORPHONE HYDROCHLORIDE 1 MG: 1 INJECTION, SOLUTION INTRAMUSCULAR; INTRAVENOUS; SUBCUTANEOUS at 11:20

## 2024-06-21 RX ADMIN — FENTANYL CITRATE 100 MCG: 50 INJECTION INTRAMUSCULAR; INTRAVENOUS at 16:49

## 2024-06-21 RX ADMIN — HYDROMORPHONE HYDROCHLORIDE 1 MG: 1 INJECTION, SOLUTION INTRAMUSCULAR; INTRAVENOUS; SUBCUTANEOUS at 23:45

## 2024-06-21 RX ADMIN — Medication 1000 UNITS: at 20:47

## 2024-06-21 RX ADMIN — Medication 200 MCG: at 17:34

## 2024-06-21 RX ADMIN — Medication 100 MCG: at 16:49

## 2024-06-21 ASSESSMENT — PAIN DESCRIPTION - ONSET
ONSET: ON-GOING
ONSET: ON-GOING

## 2024-06-21 ASSESSMENT — PAIN DESCRIPTION - LOCATION
LOCATION: HIP

## 2024-06-21 ASSESSMENT — PAIN DESCRIPTION - FREQUENCY
FREQUENCY: CONTINUOUS
FREQUENCY: CONTINUOUS

## 2024-06-21 ASSESSMENT — PAIN DESCRIPTION - DESCRIPTORS
DESCRIPTORS: DULL
DESCRIPTORS: DISCOMFORT
DESCRIPTORS: ACHING;JABBING;STABBING
DESCRIPTORS: ACHING;SHARP
DESCRIPTORS: ACHING

## 2024-06-21 ASSESSMENT — PAIN DESCRIPTION - ORIENTATION
ORIENTATION: RIGHT

## 2024-06-21 ASSESSMENT — PAIN SCALES - GENERAL
PAINLEVEL_OUTOF10: 10
PAINLEVEL_OUTOF10: 10
PAINLEVEL_OUTOF10: 8
PAINLEVEL_OUTOF10: 10
PAINLEVEL_OUTOF10: 8
PAINLEVEL_OUTOF10: 8
PAINLEVEL_OUTOF10: 6
PAINLEVEL_OUTOF10: 10

## 2024-06-21 ASSESSMENT — PAIN SCALES - WONG BAKER: WONGBAKER_NUMERICALRESPONSE: HURTS WHOLE LOT

## 2024-06-21 ASSESSMENT — PAIN DESCRIPTION - PAIN TYPE
TYPE: ACUTE PAIN
TYPE: SURGICAL PAIN

## 2024-06-21 ASSESSMENT — PAIN - FUNCTIONAL ASSESSMENT: PAIN_FUNCTIONAL_ASSESSMENT: PREVENTS OR INTERFERES WITH ALL ACTIVE AND SOME PASSIVE ACTIVITIES

## 2024-06-21 NOTE — BRIEF OP NOTE
Brief Postoperative Note      Patient: Allegra Meza  YOB: 1950  MRN: [id number]    Date of Procedure: 6/21/2024    Pre-Op Diagnosis Codes:  Right IT fracture    Post-Op Diagnosis: Same       Procedure(s):  RIGHT FEMUR CEPHOMEDULARY NAIL     Surgeon(s):  Arvin David MD    Assistant:  Resident: Roland Bal DO    Anesthesia: General    Estimated Blood Loss (mL): 200     Fluids: 500 mL's crystalloids    Complications: None    Specimens:   * No specimens in log *    Implants:  Implant Name Type Inv. Item Serial No.  Lot No. LRB No. Used Action   NAIL IM L170MM [id number] 125DEG SHT PROX FEM GRN TI-15MO - [id number]  NAIL IM L170MM [id number] NK 125DEG SHT PROX FEM GRN TI-15MO  [facility]  Right 1 Implanted   BLADE IM L90MM DIA10.35MM PROX FEM G TI KIMBERLY FEN KELTON FOR - [id number]  BLADE IM L90MM DIA10.35MM PROX FEM G TI KIMBERLY FEN KELTON FOR  DEPUY SYNTHES [place]- [id number] Right 1 Implanted   SCREW LK F/IM NAIL 5X36MM XL25 ST - [id number]  SCREW LK F/IM NAIL 5X36MM XL25 ST  DEPUY SYNTHES [employer]- [id number] Right 1 Implanted         Drains: * No LDAs found *    Findings:  Infection Present At Time Of Surgery (PATOS) (choose all levels that have infection present):  No infection present  Other Findings: Right comminuted IT fracture    Electronically signed by Roland Bal DO on 6/21/2024 at 6:48 PM

## 2024-06-21 NOTE — BRIEF OP NOTE
Brief Postoperative Note    Allegra Meza  YOB: 1950  [id number]    Pre-operative Diagnosis: Malfunctioning dialysis catheter    Post-operative Diagnosis: Same    Procedure: Left groin tunneled dialysis catheter exchange    Anesthesia: Local    Surgeons/Assistants: Jeremias    Estimated Blood Loss: less than 50     Complications: None    Specimens: Was Not Obtained      Electronically signed by Matthew Mcdowell MD on 6/21/2024 at 12:54 PM

## 2024-06-21 NOTE — DISCHARGE INSTR - COC
Continuity of Care Form    Patient Name: Allegra Meza   :  1950  MRN:  [id number]    Admit date:  2024  Discharge date:  2024    Code Status Order: Full Code   Advance Directives:     Admitting Physician:  Vaibhav Hilliard DO  PCP: Solomon Woodard MD    Discharging Nurse: Massiel  Discharging Hospital Unit/Room#: 1011/1011-02  Discharging Unit Phone Number: [phone]    Emergency Contact:   Extended Emergency Contact Information  Primary Emergency Contact: DerrickWilson  Address: [address] [place] [address]  Home Phone: [phone]  Mobile Phone: [phone]  Relation: Spouse  Preferred language: English   needed? No    Past Surgical History:  Past Surgical History:   Procedure Laterality Date    AV FISTULA REPAIR      x 2    BLADDER REPAIR       SECTION       SECTION       SECTION      CORONARY ANGIOPLASTY WITH STENT PLACEMENT      HYSTERECTOMY (CERVIX STATUS UNKNOWN)      w/o bso    JOINT REPLACEMENT      LITHOTRIPSY      PACEMAKER PLACEMENT      TONSILLECTOMY      TUBAL LIGATION      TYMPANOPLASTY         Immunization History:   Immunization History   Administered Date(s) Administered    COVID-19, MODERNA Bivalent, (age 12y+), IM, 50 mcg/0.5 mL 10/17/2022    COVID-19, PFIZER PURPLE top, DILUTE for use, (age 12 y+), 30mcg/0.3mL 02/15/2022       Active Problems:  Patient Active Problem List   Diagnosis Code    Pneumonia due to organism J18.9    Multifocal pneumonia J18.9    Presence of cardiac pacemaker Z95.0    Rheumatoid arthritis (HCC) M06.9    Peripheral vascular disorder due to diabetes mellitus (HCC) E11.51    Obstructive sleep apnea syndrome G47.33    Primary hypertension I10    End stage renal failure on dialysis (HCC) N18.6, Z99.2    Diabetes mellitus with peripheral vascular disease (HCC) E11.51    Congestive heart failure (HCC) I50.9    Arteriosclerosis of coronary artery I25.10

## 2024-06-21 NOTE — ANESTHESIA PRE PROCEDURE
III  TM distance: >3 FB   Neck ROM: full  Mouth opening: > = 3 FB   Dental: normal exam         Pulmonary:normal exam    (+)  COPD:    sleep apnea:       asthma:                            Cardiovascular:  Exercise tolerance: no interval change  (+) hypertension:, pacemaker: pacemaker, past MI:, CAD:, CHF:      ECG reviewed    Rate: normal  Echocardiogram reviewed    Cleared by cardiology              Neuro/Psych:               GI/Hepatic/Renal:   (+) renal disease: ESRD and dialysis          Endo/Other:    (+) Diabetes, : arthritis:..                 Abdominal:             Vascular:          Other Findings:       Anesthesia Plan      general     ASA 4       Induction: intravenous.    MIPS: Postoperative opioids intended and Prophylactic antiemetics administered.  Anesthetic plan and risks discussed with patient.                    Olvin Osuna DO   6/21/2024

## 2024-06-21 NOTE — CARE COORDINATION
Social work: Patient presents from [facility] and is agreeable to return at discharge.   Referral faxed to follow.   No HENS needed(return).     Update 09:08- spoke to Ainsley/[facility] Sorensen, confirmed patient is able to return at discharge.  Will need PT/OT to see patient prior to return for skilled stay and qualifying Medicare stay.   Writer also spoke to Mariela/[facility] and informed her of admission. Pt chair is ADRIAN 11:00AM.    # for weekend admission: [phone]

## 2024-06-21 NOTE — DISCHARGE INSTRUCTIONS
Orthopaedic Instructions:  -Weight bearing status: Weight bearing as tolerated with the right leg  -Starting three days after surgery, okay for daily dressing changes until wound/surgical incision site is dry. Dressing changes can be performed with simple Band-aids or gauze pads secured with tape/ace bandages. Once you no longer see drainage from your wounds on your dressings, it is okay to shower. Do not scrub vigorously, just let water run over wound/surgical sites. Additionally, one no longer needs to change dressings daily. It is important that you do not soak the wound/incision site underwater, though. This includes baths, hot tubs, swimming pools, etc.  -Always look for signs of compartment syndrome: pain out of proportion to the injury, pain not controlled with pain medication, numbness in digits, changing of color of digits (paleness). If these signs occur return to ED immediately for reassessment.  -Ice (20 minutes on and off 1 hour) and elevate above the level of the heart to reduce swelling and throbbing pain.  -Call the office or come to Emergency Room if signs of infection appear (hot, swollen, red, draining pus, fever)  -Take medications as prescribed.  -Wean off narcotics (percocet/norco) as soon as possible. Do not take tylenol if still taking narcotics.  -Follow up with Dr. David in his office 10-14 days after surgery. Call [phone] to schedule/confirm or with any questions.

## 2024-06-22 LAB
GLUCOSE BLD-MCNC: 228 MG/DL (ref 65–105)
GLUCOSE BLD-MCNC: 251 MG/DL (ref 65–105)
GLUCOSE BLD-MCNC: 297 MG/DL (ref 65–105)
GLUCOSE BLD-MCNC: 305 MG/DL (ref 65–105)

## 2024-06-22 PROCEDURE — 97535 SELF CARE MNGMENT TRAINING: CPT

## 2024-06-22 PROCEDURE — 6370000000 HC RX 637 (ALT 250 FOR IP): Performed by: NURSE PRACTITIONER

## 2024-06-22 PROCEDURE — 6360000002 HC RX W HCPCS

## 2024-06-22 PROCEDURE — 99232 SBSQ HOSP IP/OBS MODERATE 35: CPT | Performed by: INTERNAL MEDICINE

## 2024-06-22 PROCEDURE — 82947 ASSAY GLUCOSE BLOOD QUANT: CPT

## 2024-06-22 PROCEDURE — 94761 N-INVAS EAR/PLS OXIMETRY MLT: CPT

## 2024-06-22 PROCEDURE — 6370000000 HC RX 637 (ALT 250 FOR IP)

## 2024-06-22 PROCEDURE — 97530 THERAPEUTIC ACTIVITIES: CPT

## 2024-06-22 PROCEDURE — 6370000000 HC RX 637 (ALT 250 FOR IP): Performed by: INTERNAL MEDICINE

## 2024-06-22 PROCEDURE — 97166 OT EVAL MOD COMPLEX 45 MIN: CPT

## 2024-06-22 PROCEDURE — 1200000000 HC SEMI PRIVATE

## 2024-06-22 PROCEDURE — 97162 PT EVAL MOD COMPLEX 30 MIN: CPT

## 2024-06-22 RX ORDER — OXYCODONE HYDROCHLORIDE AND ACETAMINOPHEN 5; 325 MG/1; MG/1
1 TABLET ORAL EVERY 4 HOURS PRN
Status: DISCONTINUED | OUTPATIENT
Start: 2024-06-22 | End: 2024-06-23

## 2024-06-22 RX ORDER — DIPHENHYDRAMINE HCL 25 MG
25 TABLET ORAL ONCE
Status: COMPLETED | OUTPATIENT
Start: 2024-06-22 | End: 2024-06-22

## 2024-06-22 RX ORDER — DOCUSATE SODIUM 100 MG/1
100 CAPSULE, LIQUID FILLED ORAL 2 TIMES DAILY PRN
Status: DISCONTINUED | OUTPATIENT
Start: 2024-06-22 | End: 2024-06-24 | Stop reason: HOSPADM

## 2024-06-22 RX ORDER — POLYETHYLENE GLYCOL 3350 17 G/17G
17 POWDER, FOR SOLUTION ORAL DAILY
Status: DISCONTINUED | OUTPATIENT
Start: 2024-06-22 | End: 2024-06-24 | Stop reason: HOSPADM

## 2024-06-22 RX ADMIN — CARVEDILOL 25 MG: 25 TABLET, FILM COATED ORAL at 17:49

## 2024-06-22 RX ADMIN — LATANOPROST 1 DROP: 50 SOLUTION/ DROPS OPHTHALMIC at 20:08

## 2024-06-22 RX ADMIN — Medication 1000 UNITS: at 08:01

## 2024-06-22 RX ADMIN — HYDROMORPHONE HYDROCHLORIDE 1 MG: 1 INJECTION, SOLUTION INTRAMUSCULAR; INTRAVENOUS; SUBCUTANEOUS at 08:03

## 2024-06-22 RX ADMIN — ACYCLOVIR 200 MG: 200 CAPSULE ORAL at 20:09

## 2024-06-22 RX ADMIN — PANTOPRAZOLE SODIUM 40 MG: 40 TABLET, DELAYED RELEASE ORAL at 04:16

## 2024-06-22 RX ADMIN — PREDNISOLONE ACETATE 1 DROP: 10 SUSPENSION/ DROPS OPHTHALMIC at 08:07

## 2024-06-22 RX ADMIN — OXYCODONE HYDROCHLORIDE AND ACETAMINOPHEN 1 TABLET: 5; 325 TABLET ORAL at 14:17

## 2024-06-22 RX ADMIN — POLYETHYLENE GLYCOL 3350 17 G: 17 POWDER, FOR SOLUTION ORAL at 14:17

## 2024-06-22 RX ADMIN — LEVOTHYROXINE SODIUM 50 MCG: 0.05 TABLET ORAL at 05:02

## 2024-06-22 RX ADMIN — LEFLUNOMIDE 20 MG: 10 TABLET ORAL at 08:01

## 2024-06-22 RX ADMIN — HYDROMORPHONE HYDROCHLORIDE 1 MG: 1 INJECTION, SOLUTION INTRAMUSCULAR; INTRAVENOUS; SUBCUTANEOUS at 20:47

## 2024-06-22 RX ADMIN — DILTIAZEM HYDROCHLORIDE 360 MG: 180 CAPSULE, COATED, EXTENDED RELEASE ORAL at 08:01

## 2024-06-22 RX ADMIN — OXYCODONE HYDROCHLORIDE AND ACETAMINOPHEN 1 TABLET: 5; 325 TABLET ORAL at 19:43

## 2024-06-22 RX ADMIN — HYDROMORPHONE HYDROCHLORIDE 0.5 MG: 1 INJECTION, SOLUTION INTRAMUSCULAR; INTRAVENOUS; SUBCUTANEOUS at 04:16

## 2024-06-22 RX ADMIN — INSULIN GLARGINE 15 UNITS: 100 INJECTION, SOLUTION SUBCUTANEOUS at 20:08

## 2024-06-22 RX ADMIN — SERTRALINE 25 MG: 25 TABLET, FILM COATED ORAL at 08:02

## 2024-06-22 RX ADMIN — INSULIN LISPRO 4 UNITS: 100 INJECTION, SOLUTION INTRAVENOUS; SUBCUTANEOUS at 20:09

## 2024-06-22 RX ADMIN — INSULIN LISPRO 4 UNITS: 100 INJECTION, SOLUTION INTRAVENOUS; SUBCUTANEOUS at 08:18

## 2024-06-22 RX ADMIN — ACYCLOVIR 200 MG: 200 CAPSULE ORAL at 08:02

## 2024-06-22 RX ADMIN — PREDNISOLONE ACETATE 1 DROP: 10 SUSPENSION/ DROPS OPHTHALMIC at 14:18

## 2024-06-22 RX ADMIN — PREDNISOLONE ACETATE 1 DROP: 10 SUSPENSION/ DROPS OPHTHALMIC at 20:08

## 2024-06-22 RX ADMIN — DIPHENHYDRAMINE HCL 25 MG: 25 TABLET ORAL at 23:04

## 2024-06-22 RX ADMIN — CARVEDILOL 25 MG: 25 TABLET, FILM COATED ORAL at 08:01

## 2024-06-22 RX ADMIN — INSULIN LISPRO 4 UNITS: 100 INJECTION, SOLUTION INTRAVENOUS; SUBCUTANEOUS at 17:49

## 2024-06-22 ASSESSMENT — PAIN SCALES - GENERAL
PAINLEVEL_OUTOF10: 10
PAINLEVEL_OUTOF10: 8
PAINLEVEL_OUTOF10: 10

## 2024-06-22 ASSESSMENT — PAIN DESCRIPTION - ORIENTATION
ORIENTATION: RIGHT
ORIENTATION: LEFT;RIGHT

## 2024-06-22 ASSESSMENT — PAIN DESCRIPTION - LOCATION
LOCATION: HIP
LOCATION: BUTTOCKS;HIP
LOCATION: HIP;BUTTOCKS
LOCATION: HIP

## 2024-06-22 ASSESSMENT — PAIN - FUNCTIONAL ASSESSMENT
PAIN_FUNCTIONAL_ASSESSMENT: PREVENTS OR INTERFERES WITH MANY ACTIVE NOT PASSIVE ACTIVITIES
PAIN_FUNCTIONAL_ASSESSMENT: PREVENTS OR INTERFERES WITH MANY ACTIVE NOT PASSIVE ACTIVITIES
PAIN_FUNCTIONAL_ASSESSMENT: PREVENTS OR INTERFERES SOME ACTIVE ACTIVITIES AND ADLS
PAIN_FUNCTIONAL_ASSESSMENT: PREVENTS OR INTERFERES SOME ACTIVE ACTIVITIES AND ADLS

## 2024-06-22 ASSESSMENT — PAIN DESCRIPTION - FREQUENCY
FREQUENCY: CONTINUOUS
FREQUENCY: INTERMITTENT
FREQUENCY: CONTINUOUS

## 2024-06-22 ASSESSMENT — PAIN DESCRIPTION - PAIN TYPE
TYPE: SURGICAL PAIN
TYPE: SURGICAL PAIN;CHRONIC PAIN
TYPE: SURGICAL PAIN

## 2024-06-22 ASSESSMENT — PAIN DESCRIPTION - DESCRIPTORS
DESCRIPTORS: ACHING;DISCOMFORT
DESCRIPTORS: ACHING
DESCRIPTORS: ACHING;DISCOMFORT
DESCRIPTORS: ACHING

## 2024-06-22 ASSESSMENT — PAIN DESCRIPTION - ONSET
ONSET: ON-GOING

## 2024-06-22 ASSESSMENT — PAIN SCALES - WONG BAKER: WONGBAKER_NUMERICALRESPONSE: NO HURT

## 2024-06-23 LAB
ANION GAP SERPL CALCULATED.3IONS-SCNC: 17 MMOL/L (ref 9–17)
ANION GAP SERPL CALCULATED.3IONS-SCNC: 18 MMOL/L (ref 9–17)
BUN SERPL-MCNC: 53 MG/DL (ref 8–23)
BUN SERPL-MCNC: 58 MG/DL (ref 8–23)
BUN/CREAT SERPL: 10 (ref 9–20)
BUN/CREAT SERPL: 11 (ref 9–20)
CALCIUM SERPL-MCNC: 8.1 MG/DL (ref 8.6–10.4)
CALCIUM SERPL-MCNC: 8.3 MG/DL (ref 8.6–10.4)
CHLORIDE SERPL-SCNC: 88 MMOL/L (ref 98–107)
CHLORIDE SERPL-SCNC: 89 MMOL/L (ref 98–107)
CO2 SERPL-SCNC: 18 MMOL/L (ref 20–31)
CO2 SERPL-SCNC: 21 MMOL/L (ref 20–31)
CREAT SERPL-MCNC: 5 MG/DL (ref 0.5–0.9)
CREAT SERPL-MCNC: 5.6 MG/DL (ref 0.5–0.9)
ERYTHROCYTE [DISTWIDTH] IN BLOOD BY AUTOMATED COUNT: 15 % (ref 11.8–14.4)
GFR, ESTIMATED: 8 ML/MIN/1.73M2
GFR, ESTIMATED: 9 ML/MIN/1.73M2
GLUCOSE BLD-MCNC: 173 MG/DL (ref 65–105)
GLUCOSE BLD-MCNC: 182 MG/DL (ref 65–105)
GLUCOSE BLD-MCNC: 201 MG/DL (ref 65–105)
GLUCOSE BLD-MCNC: 253 MG/DL (ref 65–105)
GLUCOSE SERPL-MCNC: 186 MG/DL (ref 70–99)
GLUCOSE SERPL-MCNC: 226 MG/DL (ref 70–99)
HCT VFR BLD AUTO: 26 % (ref 36.3–47.1)
HGB BLD-MCNC: 8.2 G/DL (ref 11.9–15.1)
MCH RBC QN AUTO: 31.5 PG (ref 25.2–33.5)
MCHC RBC AUTO-ENTMCNC: 31.5 G/DL (ref 28.4–34.8)
MCV RBC AUTO: 100 FL (ref 82.6–102.9)
NRBC BLD-RTO: 0 PER 100 WBC
PHOSPHATE SERPL-MCNC: 6.4 MG/DL (ref 2.6–4.5)
PLATELET # BLD AUTO: 159 K/UL (ref 138–453)
PMV BLD AUTO: 10.6 FL (ref 8.1–13.5)
POTASSIUM SERPL-SCNC: 5.1 MMOL/L (ref 3.7–5.3)
POTASSIUM SERPL-SCNC: 5.6 MMOL/L (ref 3.7–5.3)
RBC # BLD AUTO: 2.6 M/UL (ref 3.95–5.11)
SODIUM SERPL-SCNC: 124 MMOL/L (ref 135–144)
SODIUM SERPL-SCNC: 127 MMOL/L (ref 135–144)
WBC OTHER # BLD: 8.9 K/UL (ref 3.5–11.3)

## 2024-06-23 PROCEDURE — 80048 BASIC METABOLIC PNL TOTAL CA: CPT

## 2024-06-23 PROCEDURE — 84100 ASSAY OF PHOSPHORUS: CPT

## 2024-06-23 PROCEDURE — 6370000000 HC RX 637 (ALT 250 FOR IP): Performed by: SPECIALIST

## 2024-06-23 PROCEDURE — 97535 SELF CARE MNGMENT TRAINING: CPT

## 2024-06-23 PROCEDURE — 36415 COLL VENOUS BLD VENIPUNCTURE: CPT

## 2024-06-23 PROCEDURE — 6360000002 HC RX W HCPCS

## 2024-06-23 PROCEDURE — 85027 COMPLETE CBC AUTOMATED: CPT

## 2024-06-23 PROCEDURE — 6360000002 HC RX W HCPCS: Performed by: INTERNAL MEDICINE

## 2024-06-23 PROCEDURE — 6370000000 HC RX 637 (ALT 250 FOR IP)

## 2024-06-23 PROCEDURE — 99232 SBSQ HOSP IP/OBS MODERATE 35: CPT | Performed by: INTERNAL MEDICINE

## 2024-06-23 PROCEDURE — 97110 THERAPEUTIC EXERCISES: CPT

## 2024-06-23 PROCEDURE — 1200000000 HC SEMI PRIVATE

## 2024-06-23 PROCEDURE — 97530 THERAPEUTIC ACTIVITIES: CPT

## 2024-06-23 PROCEDURE — 82947 ASSAY GLUCOSE BLOOD QUANT: CPT

## 2024-06-23 PROCEDURE — 6370000000 HC RX 637 (ALT 250 FOR IP): Performed by: INTERNAL MEDICINE

## 2024-06-23 PROCEDURE — 6370000000 HC RX 637 (ALT 250 FOR IP): Performed by: NURSE PRACTITIONER

## 2024-06-23 RX ORDER — SEVELAMER CARBONATE 800 MG/1
1600 TABLET, FILM COATED ORAL
Status: DISCONTINUED | OUTPATIENT
Start: 2024-06-23 | End: 2024-06-24 | Stop reason: HOSPADM

## 2024-06-23 RX ORDER — TRAMADOL HYDROCHLORIDE 50 MG/1
50 TABLET ORAL 2 TIMES DAILY PRN
Status: DISCONTINUED | OUTPATIENT
Start: 2024-06-23 | End: 2024-06-24

## 2024-06-23 RX ORDER — TRAMADOL HYDROCHLORIDE 50 MG/1
100 TABLET ORAL EVERY 6 HOURS PRN
Status: DISCONTINUED | OUTPATIENT
Start: 2024-06-23 | End: 2024-06-23

## 2024-06-23 RX ORDER — TRAMADOL HYDROCHLORIDE 50 MG/1
50 TABLET ORAL EVERY 6 HOURS PRN
Status: DISCONTINUED | OUTPATIENT
Start: 2024-06-23 | End: 2024-06-23

## 2024-06-23 RX ORDER — TRAMADOL HYDROCHLORIDE 50 MG/1
100 TABLET ORAL 2 TIMES DAILY PRN
Status: DISCONTINUED | OUTPATIENT
Start: 2024-06-23 | End: 2024-06-24

## 2024-06-23 RX ORDER — DIPHENHYDRAMINE HCL 25 MG
25 TABLET ORAL EVERY 6 HOURS PRN
Status: DISCONTINUED | OUTPATIENT
Start: 2024-06-23 | End: 2024-06-24 | Stop reason: HOSPADM

## 2024-06-23 RX ORDER — HYDROMORPHONE HYDROCHLORIDE 1 MG/ML
1 INJECTION, SOLUTION INTRAMUSCULAR; INTRAVENOUS; SUBCUTANEOUS EVERY 4 HOURS PRN
Status: DISCONTINUED | OUTPATIENT
Start: 2024-06-23 | End: 2024-06-24

## 2024-06-23 RX ADMIN — INSULIN LISPRO 2 UNITS: 100 INJECTION, SOLUTION INTRAVENOUS; SUBCUTANEOUS at 16:51

## 2024-06-23 RX ADMIN — DILTIAZEM HYDROCHLORIDE 360 MG: 180 CAPSULE, COATED, EXTENDED RELEASE ORAL at 09:09

## 2024-06-23 RX ADMIN — POLYETHYLENE GLYCOL 3350 17 G: 17 POWDER, FOR SOLUTION ORAL at 09:08

## 2024-06-23 RX ADMIN — PANTOPRAZOLE SODIUM 40 MG: 40 TABLET, DELAYED RELEASE ORAL at 05:48

## 2024-06-23 RX ADMIN — PREDNISOLONE ACETATE 1 DROP: 10 SUSPENSION/ DROPS OPHTHALMIC at 20:23

## 2024-06-23 RX ADMIN — DIPHENHYDRAMINE HCL 25 MG: 25 TABLET ORAL at 19:24

## 2024-06-23 RX ADMIN — Medication 1000 UNITS: at 09:08

## 2024-06-23 RX ADMIN — SODIUM ZIRCONIUM CYCLOSILICATE 10 G: 10 POWDER, FOR SUSPENSION ORAL at 09:16

## 2024-06-23 RX ADMIN — CARVEDILOL 25 MG: 25 TABLET, FILM COATED ORAL at 09:09

## 2024-06-23 RX ADMIN — TRAMADOL HYDROCHLORIDE 100 MG: 50 TABLET ORAL at 09:09

## 2024-06-23 RX ADMIN — OXYCODONE HYDROCHLORIDE AND ACETAMINOPHEN 1 TABLET: 5; 325 TABLET ORAL at 05:48

## 2024-06-23 RX ADMIN — SEVELAMER CARBONATE 1600 MG: 800 TABLET, FILM COATED ORAL at 12:07

## 2024-06-23 RX ADMIN — LEVOTHYROXINE SODIUM 50 MCG: 0.05 TABLET ORAL at 05:48

## 2024-06-23 RX ADMIN — HYDROMORPHONE HYDROCHLORIDE 1 MG: 1 INJECTION, SOLUTION INTRAMUSCULAR; INTRAVENOUS; SUBCUTANEOUS at 13:21

## 2024-06-23 RX ADMIN — LATANOPROST 1 DROP: 50 SOLUTION/ DROPS OPHTHALMIC at 20:23

## 2024-06-23 RX ADMIN — APIXABAN 5 MG: 5 TABLET, FILM COATED ORAL at 12:07

## 2024-06-23 RX ADMIN — TRAMADOL HYDROCHLORIDE 100 MG: 50 TABLET ORAL at 23:01

## 2024-06-23 RX ADMIN — DIPHENHYDRAMINE HCL 25 MG: 25 TABLET ORAL at 12:07

## 2024-06-23 RX ADMIN — ACYCLOVIR 200 MG: 200 CAPSULE ORAL at 09:08

## 2024-06-23 RX ADMIN — OXYCODONE HYDROCHLORIDE AND ACETAMINOPHEN 1 TABLET: 5; 325 TABLET ORAL at 00:29

## 2024-06-23 RX ADMIN — ACYCLOVIR 200 MG: 200 CAPSULE ORAL at 20:22

## 2024-06-23 RX ADMIN — PREDNISOLONE ACETATE 1 DROP: 10 SUSPENSION/ DROPS OPHTHALMIC at 16:52

## 2024-06-23 RX ADMIN — LEFLUNOMIDE 20 MG: 10 TABLET ORAL at 09:08

## 2024-06-23 RX ADMIN — INSULIN GLARGINE 15 UNITS: 100 INJECTION, SOLUTION SUBCUTANEOUS at 20:22

## 2024-06-23 RX ADMIN — CARVEDILOL 25 MG: 25 TABLET, FILM COATED ORAL at 16:51

## 2024-06-23 RX ADMIN — INSULIN LISPRO 4 UNITS: 100 INJECTION, SOLUTION INTRAVENOUS; SUBCUTANEOUS at 13:19

## 2024-06-23 RX ADMIN — SERTRALINE 25 MG: 25 TABLET, FILM COATED ORAL at 09:08

## 2024-06-23 RX ADMIN — APIXABAN 5 MG: 5 TABLET, FILM COATED ORAL at 20:22

## 2024-06-23 RX ADMIN — PREDNISOLONE ACETATE 1 DROP: 10 SUSPENSION/ DROPS OPHTHALMIC at 09:08

## 2024-06-23 RX ADMIN — SEVELAMER CARBONATE 1600 MG: 800 TABLET, FILM COATED ORAL at 16:51

## 2024-06-23 RX ADMIN — HYDROMORPHONE HYDROCHLORIDE 1 MG: 1 INJECTION, SOLUTION INTRAMUSCULAR; INTRAVENOUS; SUBCUTANEOUS at 19:24

## 2024-06-23 ASSESSMENT — PAIN DESCRIPTION - PAIN TYPE
TYPE: SURGICAL PAIN

## 2024-06-23 ASSESSMENT — PAIN - FUNCTIONAL ASSESSMENT
PAIN_FUNCTIONAL_ASSESSMENT: PREVENTS OR INTERFERES WITH MANY ACTIVE NOT PASSIVE ACTIVITIES
PAIN_FUNCTIONAL_ASSESSMENT: PREVENTS OR INTERFERES SOME ACTIVE ACTIVITIES AND ADLS
PAIN_FUNCTIONAL_ASSESSMENT: PREVENTS OR INTERFERES WITH MANY ACTIVE NOT PASSIVE ACTIVITIES
PAIN_FUNCTIONAL_ASSESSMENT: PREVENTS OR INTERFERES SOME ACTIVE ACTIVITIES AND ADLS

## 2024-06-23 ASSESSMENT — PAIN SCALES - GENERAL
PAINLEVEL_OUTOF10: 8
PAINLEVEL_OUTOF10: 9
PAINLEVEL_OUTOF10: 8
PAINLEVEL_OUTOF10: 10
PAINLEVEL_OUTOF10: 5
PAINLEVEL_OUTOF10: 8
PAINLEVEL_OUTOF10: 10
PAINLEVEL_OUTOF10: 9
PAINLEVEL_OUTOF10: 7

## 2024-06-23 ASSESSMENT — PAIN DESCRIPTION - ORIENTATION
ORIENTATION: RIGHT

## 2024-06-23 ASSESSMENT — PAIN DESCRIPTION - LOCATION
LOCATION: HIP
LOCATION: BUTTOCKS;HIP
LOCATION: HIP;BUTTOCKS

## 2024-06-23 ASSESSMENT — PAIN DESCRIPTION - DESCRIPTORS
DESCRIPTORS: ACHING

## 2024-06-23 ASSESSMENT — PAIN DESCRIPTION - ONSET
ONSET: ON-GOING

## 2024-06-23 ASSESSMENT — PAIN DESCRIPTION - FREQUENCY
FREQUENCY: INTERMITTENT
FREQUENCY: INTERMITTENT
FREQUENCY: CONTINUOUS
FREQUENCY: CONTINUOUS
FREQUENCY: INTERMITTENT
FREQUENCY: CONTINUOUS

## 2024-06-23 NOTE — CARE COORDINATION
Discharge planning    Per social workers notes on 6/21 patient can return. Will need to call [phone] to notify of the weekend admission    Patient also current with [facility] and SS informed her of admission. Pt chair is ADRIAN 11:00AM.

## 2024-06-23 NOTE — OP NOTE
Operative Note      Patient: Allegra Meza  YOB: 1950  MRN: [id number]    Date of Procedure: 6/21/2024     Pre-Op Diagnosis Codes:  Right IT fracture     Post-Op Diagnosis: Same       Procedure(s):  Right femur short cephalomedullary nail     Surgeon(s):  Arvin David MD     Assistant:  Resident: Roland Bal DO     Anesthesia: General     Estimated Blood Loss (mL): 200      Fluids: 500 mL's crystalloids     Complications: None     Specimens:   * No specimens in log *    Implants:  Implant Name Type Inv. Item Serial No.  Lot No. LRB No. Used Action   NAIL IM L170MM [id number] NK 125DEG SHT PROX FEM GRN TI-15MO - [id number]  NAIL IM L170MM [id number] NK 125DEG SHT PROX FEM GRN TI-15MO  [employer]-  Right 1 Implanted   BLADE IM L90MM DIA10.35MM PROX FEM G TI KIMBERLY FEN KELTON FOR - [id number]  BLADE IM L90MM DIA10.35MM PROX FEM G TI KIMBER[employer] FEN KEL[employer] FOR  [employer] [place]- [id number] Right 1 Implanted   SCREW LK F/IM NAIL 5X36MM XL25 ST - [id number]  SCREW LK F/IM NAIL 5X36MM XL25 ST  DEPUY SYNTHES [facility]- [id number] Right 1 Implanted         Drains: * No LDAs found *    Findings:  Infection Present At Time Of Surgery (PATOS) (choose all levels that have infection present):  No infection present  Other Findings: Comminuted right intertrochanteric femur fracture    Detailed Description of Procedure:     On the day of surgery patient was met in the pre-operative unit where written consent was obtained and the operative site was marked with permanent marker. The patient was then wheeled back to the operative suite. General anesthesia was induced and the patient underwent endotracheal intubation. The operative foot was placed into a well-padded foot medina and the patient was moved over to the fracture table. A well padded perineal post was put into the place and the non-operative leg was placed into a well-padded well-leg medina. Appropriate antibiotics were being infused at this time. A timeout

## 2024-06-24 VITALS
DIASTOLIC BLOOD PRESSURE: 36 MMHG | SYSTOLIC BLOOD PRESSURE: 118 MMHG | RESPIRATION RATE: 16 BRPM | HEART RATE: 70 BPM | TEMPERATURE: 98.1 F | OXYGEN SATURATION: 99 % | BODY MASS INDEX: 21.21 KG/M2 | HEIGHT: 67 IN | WEIGHT: 135.14 LBS

## 2024-06-24 LAB
ANION GAP SERPL CALCULATED.3IONS-SCNC: 20 MMOL/L (ref 9–17)
BUN SERPL-MCNC: 63 MG/DL (ref 8–23)
BUN/CREAT SERPL: 10 (ref 9–20)
CALCIUM SERPL-MCNC: 7.8 MG/DL (ref 8.6–10.4)
CHLORIDE SERPL-SCNC: 88 MMOL/L (ref 98–107)
CO2 SERPL-SCNC: 21 MMOL/L (ref 20–31)
CREAT SERPL-MCNC: 6.2 MG/DL (ref 0.5–0.9)
GFR, ESTIMATED: 7 ML/MIN/1.73M2
GLUCOSE BLD-MCNC: 170 MG/DL (ref 65–105)
GLUCOSE BLD-MCNC: 216 MG/DL (ref 65–105)
GLUCOSE SERPL-MCNC: 164 MG/DL (ref 70–99)
POTASSIUM SERPL-SCNC: 5 MMOL/L (ref 3.7–5.3)
SODIUM SERPL-SCNC: 129 MMOL/L (ref 135–144)

## 2024-06-24 PROCEDURE — 36415 COLL VENOUS BLD VENIPUNCTURE: CPT

## 2024-06-24 PROCEDURE — 2500000003 HC RX 250 WO HCPCS

## 2024-06-24 PROCEDURE — 6370000000 HC RX 637 (ALT 250 FOR IP)

## 2024-06-24 PROCEDURE — 6370000000 HC RX 637 (ALT 250 FOR IP): Performed by: SPECIALIST

## 2024-06-24 PROCEDURE — 82947 ASSAY GLUCOSE BLOOD QUANT: CPT

## 2024-06-24 PROCEDURE — 80048 BASIC METABOLIC PNL TOTAL CA: CPT

## 2024-06-24 PROCEDURE — 90935 HEMODIALYSIS ONE EVALUATION: CPT

## 2024-06-24 PROCEDURE — 6370000000 HC RX 637 (ALT 250 FOR IP): Performed by: INTERNAL MEDICINE

## 2024-06-24 PROCEDURE — 99239 HOSP IP/OBS DSCHRG MGMT >30: CPT | Performed by: INTERNAL MEDICINE

## 2024-06-24 PROCEDURE — 6360000002 HC RX W HCPCS: Performed by: INTERNAL MEDICINE

## 2024-06-24 PROCEDURE — 6370000000 HC RX 637 (ALT 250 FOR IP): Performed by: NURSE PRACTITIONER

## 2024-06-24 RX ORDER — ACETAMINOPHEN 650 MG/1
650 SUPPOSITORY RECTAL EVERY 6 HOURS PRN
Status: DISCONTINUED | OUTPATIENT
Start: 2024-06-24 | End: 2024-06-24 | Stop reason: HOSPADM

## 2024-06-24 RX ORDER — NALOXONE HYDROCHLORIDE 4 MG/.1ML
1 SPRAY NASAL PRN
DISCHARGE
Start: 2024-06-24

## 2024-06-24 RX ORDER — ACETAMINOPHEN 325 MG/1
650 TABLET ORAL EVERY 6 HOURS PRN
Status: DISCONTINUED | OUTPATIENT
Start: 2024-06-24 | End: 2024-06-24 | Stop reason: HOSPADM

## 2024-06-24 RX ORDER — HYDROMORPHONE HYDROCHLORIDE 2 MG/1
1 TABLET ORAL EVERY 4 HOURS PRN
Qty: 15 TABLET | Refills: 0 | Status: SHIPPED | OUTPATIENT
Start: 2024-06-24 | End: 2024-06-27

## 2024-06-24 RX ORDER — ACETAMINOPHEN 500 MG
500 TABLET ORAL EVERY 4 HOURS PRN
Qty: 120 TABLET | Refills: 3 | DISCHARGE
Start: 2024-06-24

## 2024-06-24 RX ORDER — HYDROMORPHONE HYDROCHLORIDE 2 MG/1
1 TABLET ORAL EVERY 4 HOURS PRN
Status: DISCONTINUED | OUTPATIENT
Start: 2024-06-24 | End: 2024-06-24 | Stop reason: HOSPADM

## 2024-06-24 RX ORDER — ALPRAZOLAM 1 MG/1
1 TABLET ORAL 3 TIMES DAILY PRN
Qty: 10 TABLET | Refills: 0 | Status: SHIPPED | OUTPATIENT
Start: 2024-06-24 | End: 2024-07-24

## 2024-06-24 RX ORDER — SEVELAMER CARBONATE 800 MG/1
1600 TABLET, FILM COATED ORAL
Qty: 90 TABLET | Refills: 3 | DISCHARGE
Start: 2024-06-24

## 2024-06-24 RX ORDER — PSEUDOEPHEDRINE HCL 30 MG
100 TABLET ORAL 2 TIMES DAILY PRN
DISCHARGE
Start: 2024-06-24

## 2024-06-24 RX ADMIN — DIPHENHYDRAMINE HCL 25 MG: 25 TABLET ORAL at 01:32

## 2024-06-24 RX ADMIN — HYDROMORPHONE HYDROCHLORIDE 1 MG: 1 INJECTION, SOLUTION INTRAMUSCULAR; INTRAVENOUS; SUBCUTANEOUS at 04:17

## 2024-06-24 RX ADMIN — SEVELAMER CARBONATE 1600 MG: 800 TABLET, FILM COATED ORAL at 08:34

## 2024-06-24 RX ADMIN — SERTRALINE 25 MG: 25 TABLET, FILM COATED ORAL at 08:33

## 2024-06-24 RX ADMIN — Medication 1000 UNITS: at 08:34

## 2024-06-24 RX ADMIN — PREDNISOLONE ACETATE 1 DROP: 10 SUSPENSION/ DROPS OPHTHALMIC at 08:33

## 2024-06-24 RX ADMIN — SEVELAMER CARBONATE 1600 MG: 800 TABLET, FILM COATED ORAL at 15:57

## 2024-06-24 RX ADMIN — ACYCLOVIR 200 MG: 200 CAPSULE ORAL at 08:33

## 2024-06-24 RX ADMIN — LEVOTHYROXINE SODIUM 50 MCG: 0.05 TABLET ORAL at 04:18

## 2024-06-24 RX ADMIN — HYDROMORPHONE HYDROCHLORIDE 1 MG: 2 TABLET ORAL at 09:07

## 2024-06-24 RX ADMIN — PANTOPRAZOLE SODIUM 40 MG: 40 TABLET, DELAYED RELEASE ORAL at 04:17

## 2024-06-24 RX ADMIN — DIPHENHYDRAMINE HCL 25 MG: 25 TABLET ORAL at 08:33

## 2024-06-24 RX ADMIN — HYDROMORPHONE HYDROCHLORIDE 1 MG: 2 TABLET ORAL at 15:56

## 2024-06-24 RX ADMIN — POLYETHYLENE GLYCOL 3350 17 G: 17 POWDER, FOR SOLUTION ORAL at 08:33

## 2024-06-24 RX ADMIN — CARVEDILOL 25 MG: 25 TABLET, FILM COATED ORAL at 15:57

## 2024-06-24 RX ADMIN — HYDROMORPHONE HYDROCHLORIDE 1 MG: 1 INJECTION, SOLUTION INTRAMUSCULAR; INTRAVENOUS; SUBCUTANEOUS at 00:07

## 2024-06-24 RX ADMIN — LEFLUNOMIDE 20 MG: 10 TABLET ORAL at 08:33

## 2024-06-24 RX ADMIN — Medication 3.1 ML: at 14:34

## 2024-06-24 RX ADMIN — DIPHENHYDRAMINE HCL 25 MG: 25 TABLET ORAL at 15:57

## 2024-06-24 RX ADMIN — APIXABAN 5 MG: 5 TABLET, FILM COATED ORAL at 08:33

## 2024-06-24 ASSESSMENT — PAIN DESCRIPTION - ORIENTATION
ORIENTATION: RIGHT

## 2024-06-24 ASSESSMENT — PAIN SCALES - GENERAL
PAINLEVEL_OUTOF10: 9
PAINLEVEL_OUTOF10: 8
PAINLEVEL_OUTOF10: 8
PAINLEVEL_OUTOF10: 6
PAINLEVEL_OUTOF10: 8
PAINLEVEL_OUTOF10: 6
PAINLEVEL_OUTOF10: 8
PAINLEVEL_OUTOF10: 8
PAINLEVEL_OUTOF10: 6
PAINLEVEL_OUTOF10: 8

## 2024-06-24 ASSESSMENT — PAIN - FUNCTIONAL ASSESSMENT
PAIN_FUNCTIONAL_ASSESSMENT: PREVENTS OR INTERFERES WITH MANY ACTIVE NOT PASSIVE ACTIVITIES
PAIN_FUNCTIONAL_ASSESSMENT: PREVENTS OR INTERFERES SOME ACTIVE ACTIVITIES AND ADLS

## 2024-06-24 ASSESSMENT — PAIN DESCRIPTION - DESCRIPTORS
DESCRIPTORS: ACHING
DESCRIPTORS: ACHING;NAGGING
DESCRIPTORS: ACHING;DISCOMFORT
DESCRIPTORS: ACHING;NAGGING
DESCRIPTORS: ACHING
DESCRIPTORS: ACHING

## 2024-06-24 ASSESSMENT — PAIN DESCRIPTION - PAIN TYPE
TYPE: ACUTE PAIN;SURGICAL PAIN
TYPE: SURGICAL PAIN
TYPE: SURGICAL PAIN
TYPE: ACUTE PAIN;SURGICAL PAIN

## 2024-06-24 ASSESSMENT — PAIN DESCRIPTION - LOCATION
LOCATION: HIP
LOCATION: HIP
LOCATION: HIP;LEG
LOCATION: HIP;LEG
LOCATION: HIP
LOCATION: HIP;LEG

## 2024-06-24 ASSESSMENT — PAIN DESCRIPTION - FREQUENCY
FREQUENCY: CONTINUOUS
FREQUENCY: CONTINUOUS

## 2024-06-24 ASSESSMENT — PAIN DESCRIPTION - ONSET
ONSET: ON-GOING
ONSET: ON-GOING

## 2024-06-24 NOTE — PROCEDURES
[place] [address]                             PROCEDURE NOTE      PATIENT NAME: JANETT JQAUEZ           : 1950  MED REC NO: [id number]                         ROOM:   ACCOUNT NO: [id number]                       ADMIT DATE: 2024  PROVIDER: Arvin Travis MD      DATE OF PROCEDURE:  2024    SURGEON:  Arvin Travis MD    PROCEDURE:  Intraoperative fluoroscopy for fixation of right hip fracture.    Initial x-rays were placed out using fluoroscopy to show the displacement and then manipulative reduction has been carried out to almost anatomic position.    Fluoroscopy was then used to identify the middle of the trochanter and shaft in the lateral plane with placement of the guide pin.    The position was confirmed in the AP view and guidewire is seen passed into the femoral canal.    Opening  reamer has been passed and its position was confirmed with fluoroscopy.    X-rays were taken showing  passage of intramedullary edgar with fixation proximally with helical blade and locking distally with a screw.  Final x-rays were taken both in the AP lateral confirming good reduction and fixation.          ARVIN TRAVIS MD      D:  2024 10:32:17     T:  2024 11:23:47     AP/AQS  Job #:  [id number]     Doc#:  [id number]

## 2024-06-24 NOTE — PLAN OF CARE
Problem: Discharge Planning  Goal: Discharge to home or other facility with appropriate resources  6/24/2024 1054 by Massiel Chandra RN  Outcome: Progressing  Flowsheets (Taken 6/24/2024 0745)  Discharge to home or other facility with appropriate resources:   Identify barriers to discharge with patient and caregiver   Arrange for needed discharge resources and transportation as appropriate   Identify discharge learning needs (meds, wound care, etc)   Refer to discharge planning if patient needs post-hospital services based on physician order or complex needs related to functional status, cognitive ability or social support system       Problem: Pain  Goal: Verbalizes/displays adequate comfort level or baseline comfort level  6/24/2024 1054 by Massiel Chandra RN  Outcome: Progressing       Problem: Skin/Tissue Integrity  Goal: Absence of new skin breakdown  Description: 1.  Monitor for areas of redness and/or skin breakdown  2.  Assess vascular access sites hourly  3.  Every 4-6 hours minimum:  Change oxygen saturation probe site  4.  Every 4-6 hours:  If on nasal continuous positive airway pressure, respiratory therapy assess nares and determine need for appliance change or resting period.  6/24/2024 1054 by Massiel Chandra RN  Outcome: Progressing       Problem: Safety - Adult  Goal: Free from fall injury  6/24/2024 1054 by Massiel Chandra RN  Outcome: Progressing    Problem: ABCDS Injury Assessment  Goal: Absence of physical injury  6/24/2024 1054 by Massiel Chandra RN  Outcome: Progressing       Problem: Chronic Conditions and Co-morbidities  Goal: Patient's chronic conditions and co-morbidity symptoms are monitored and maintained or improved  6/24/2024 1054 by Massiel Chandra RN  Outcome: Progressing  Flowsheets (Taken 6/24/2024 0745)  Care Plan - Patient's Chronic Conditions and Co-Morbidity Symptoms are Monitored and Maintained or Improved:   Monitor and assess patient's chronic conditions and comorbid 
  Problem: Discharge Planning  Goal: Discharge to home or other facility with appropriate resources  6/24/2024 1704 by Massiel Chandra RN  Outcome: Completed       Problem: Pain  Goal: Verbalizes/displays adequate comfort level or baseline comfort level  6/24/2024 1704 by Massiel Chandra RN  Outcome: Completed       Problem: Skin/Tissue Integrity  Goal: Absence of new skin breakdown  Description: 1.  Monitor for areas of redness and/or skin breakdown  2.  Assess vascular access sites hourly  3.  Every 4-6 hours minimum:  Change oxygen saturation probe site  4.  Every 4-6 hours:  If on nasal continuous positive airway pressure, respiratory therapy assess nares and determine need for appliance change or resting period.  6/24/2024 1704 by Massiel Chandra RN  Outcome: Completed       Problem: Safety - Adult  Goal: Free from fall injury  6/24/2024 1704 by Massiel Chandra RN  Outcome: Completed    Problem: ABCDS Injury Assessment  Goal: Absence of physical injury  6/24/2024 1704 by Massiel Chandra RN  Outcome: Completed       Problem: Chronic Conditions and Co-morbidities  Goal: Patient's chronic conditions and co-morbidity symptoms are monitored and maintained or improved  6/24/2024 1704 by Massiel Chandra RN  Outcome: Completed       Problem: Neurosensory - Adult  Goal: Achieves stable or improved neurological status  6/24/2024 1704 by Massiel Chandra RN  Outcome: Completed    Goal: Absence of seizures  6/24/2024 1704 by Massiel Chandra RN  Outcome: Completed    Goal: Remains free of injury related to seizures activity  6/24/2024 1704 by Massiel Chandra RN  Outcome: Completed    Goal: Achieves maximal functionality and self care  6/24/2024 1704 by Massiel Chandra RN  Outcome: Completed       Problem: Musculoskeletal - Adult  Goal: Return mobility to safest level of function  6/24/2024 1704 by Massiel Chandra RN  Outcome: Completed    Goal: Maintain proper alignment of affected body part  6/24/2024 1704 by 
  Problem: Discharge Planning  Goal: Discharge to home or other facility with appropriate resources  Outcome: Progressing     Problem: Pain  Goal: Verbalizes/displays adequate comfort level or baseline comfort level  Outcome: Progressing  Flowsheets (Taken 6/24/2024 0144)  Verbalizes/displays adequate comfort level or baseline comfort level:   Encourage patient to monitor pain and request assistance   Administer analgesics based on type and severity of pain and evaluate response   Consider cultural and social influences on pain and pain management   Assess pain using appropriate pain scale   Implement non-pharmacological measures as appropriate and evaluate response     Problem: Skin/Tissue Integrity  Goal: Absence of new skin breakdown  Description: 1.  Monitor for areas of redness and/or skin breakdown  2.  Assess vascular access sites hourly  3.  Every 4-6 hours minimum:  Change oxygen saturation probe site  4.  Every 4-6 hours:  If on nasal continuous positive airway pressure, respiratory therapy assess nares and determine need for appliance change or resting period.  Outcome: Progressing     Problem: Safety - Adult  Goal: Free from fall injury  Outcome: Progressing  Flowsheets (Taken 6/24/2024 0144)  Free From Fall Injury:   Instruct family/caregiver on patient safety   Based on caregiver fall risk screen, instruct family/caregiver to ask for assistance with transferring infant if caregiver noted to have fall risk factors     Problem: Chronic Conditions and Co-morbidities  Goal: Patient's chronic conditions and co-morbidity symptoms are monitored and maintained or improved  Outcome: Progressing     Problem: Neurosensory - Adult  Goal: Achieves stable or improved neurological status  Outcome: Progressing  Goal: Absence of seizures  Outcome: Progressing  Goal: Remains free of injury related to seizures activity  Outcome: Progressing  Goal: Achieves maximal functionality and self care  Outcome: Progressing   
  Problem: Discharge Planning  Goal: Discharge to home or other facility with appropriate resources  Outcome: Progressing  Flowsheets (Taken 6/22/2024 2000)  Discharge to home or other facility with appropriate resources:   Identify barriers to discharge with patient and caregiver   Arrange for needed discharge resources and transportation as appropriate   Identify discharge learning needs (meds, wound care, etc)   Refer to discharge planning if patient needs post-hospital services based on physician order or complex needs related to functional status, cognitive ability or social support system     Problem: Pain  Goal: Verbalizes/displays adequate comfort level or baseline comfort level  Outcome: Progressing     Problem: Safety - Adult  Goal: Free from fall injury  Outcome: Progressing     
  Problem: Pain  Goal: Verbalizes/displays adequate comfort level or baseline comfort level  6/21/2024 1153 by Braydon Parr, RN  Outcome: Progressing  Flowsheets (Taken 6/21/2024 1153)  Verbalizes/displays adequate comfort level or baseline comfort level:   Encourage patient to monitor pain and request assistance   Administer analgesics based on type and severity of pain and evaluate response   Assess pain using appropriate pain scale   Implement non-pharmacological measures as appropriate and evaluate response  Note: Patient admitted s/p fall sustaining right hip fx. Patient will have surgery today right hip nailing after having dialysis cath exchange. Patient states Dilaudid 0.5mg q3 not effective for pain control.  Blood adjusted dose of Dilaudid  6/21/2024 0246 by Leonora Kim, RN  Outcome: Progressing     
Orthopedic Surgery Update Progress Note:    Allegra Meza is a 73 y.o. female, being seen for:    -R IT fracture s/p CMN     - WBAT: WBAT RLE  - Ok to change dressings POD#2. Xeroform, fluffs, Tegaderm.   - Ok to reinforce dressings as needed to keep dressings clean and dry  - OK for adult diet from ortho perspective  - Please complete post op clindamycin x2 doses  - Recommend holding restarting Eliquis until POD#2  - Encourage PT/OT  - F/u with Dr. David in 10-14 days from surgery    Roland Bal DO  Orthopedic Surgery Resident, PGY-3  [place], [place]    
Patient alert and oriented; forgetful at times. VSS. V paced on tele. SpO2 mid 90s on RA. Afebrile. Patient having pain on their right hip and rates it a 10. Pain interventions include medication and pillow support/positioning. Patients goal for pain relief is  0 . The need for pain and symptom management will be considered in the discharge planning process to ensure patients comfort. NPO for hip surgery today. CHG bath given; LLE HD CVC dressing changed. Possible new HD CVC placement with HD today. Will need rehab at d/c. Bed alarm on, call light within reach. Will continue to monitor.       Problem: Discharge Planning  Goal: Discharge to home or other facility with appropriate resources  Outcome: Progressing     Problem: Pain  Goal: Verbalizes/displays adequate comfort level or baseline comfort level  Outcome: Progressing     Problem: Skin/Tissue Integrity  Goal: Absence of new skin breakdown  Description: 1.  Monitor for areas of redness and/or skin breakdown  2.  Assess vascular access sites hourly  3.  Every 4-6 hours minimum:  Change oxygen saturation probe site  4.  Every 4-6 hours:  If on nasal continuous positive airway pressure, respiratory therapy assess nares and determine need for appliance change or resting period.  Outcome: Progressing     Problem: Safety - Adult  Goal: Free from fall injury  Outcome: Progressing     Problem: ABCDS Injury Assessment  Goal: Absence of physical injury  Outcome: Progressing     Problem: Chronic Conditions and Co-morbidities  Goal: Patient's chronic conditions and co-morbidity symptoms are monitored and maintained or improved  Outcome: Progressing     
Patient alert and oriented; forgetful. VSS. V paced on tele. SpO2 mid 90s on RA. Afebrile. Patient having pain on their right hip and rates it a 10. Pain interventions include medication and pillow support/positioning. Patients goal for pain relief is  0 . The need for pain and symptom management will be considered in the discharge planning process to ensure patients comfort. Anuric; HD MWF. Right hip dressing remains c/d/I; no drainage. [facility] once medically stable. Bed alarm on, call light within reach. Will continue to monitor.       Problem: Discharge Planning  Goal: Discharge to home or other facility with appropriate resources  Outcome: Progressing     Problem: Pain  Goal: Verbalizes/displays adequate comfort level or baseline comfort level  6/21/2024 2239 by Leonora Kim, RN  Outcome: Progressing  6/21/2024 1153 by Braydon Parr, RN  Outcome: Progressing  Flowsheets (Taken 6/21/2024 1153)  Verbalizes/displays adequate comfort level or baseline comfort level:   Encourage patient to monitor pain and request assistance   Administer analgesics based on type and severity of pain and evaluate response   Assess pain using appropriate pain scale   Implement non-pharmacological measures as appropriate and evaluate response  Note: Patient admitted s/p fall sustaining right hip fx. Patient will have surgery today right hip nailing after having dialysis cath exchange. Patient states Dilaudid 0.5mg q3 not effective for pain control. Dr. Hilliard adjusted dose of Dilaudid     Problem: Skin/Tissue Integrity  Goal: Absence of new skin breakdown  Description: 1.  Monitor for areas of redness and/or skin breakdown  2.  Assess vascular access sites hourly  3.  Every 4-6 hours minimum:  Change oxygen saturation probe site  4.  Every 4-6 hours:  If on nasal continuous positive airway pressure, respiratory therapy assess nares and determine need for appliance change or resting period.  Outcome: Progressing   
function maintained: Monitor labs and assess for signs and symptoms of volume excess or deficit  6/21/2024 2239 by Leonora Kim RN  Outcome: Progressing  Goal: Glucose maintained within prescribed range  6/22/2024 0834 by Nadja Damon RN  Outcome: Progressing  Flowsheets (Taken 6/22/2024 0750)  Glucose maintained within prescribed range: Monitor blood glucose as ordered  6/21/2024 2239 by Leonora Kim RN  Outcome: Progressing     Problem: Cardiovascular - Adult  Goal: Maintains optimal cardiac output and hemodynamic stability  6/22/2024 0834 by Nadja Damon RN  Outcome: Progressing  6/21/2024 2239 by Leonora Kim RN  Outcome: Progressing  Goal: Absence of cardiac dysrhythmias or at baseline  6/22/2024 0834 by Nadja Damon RN  Outcome: Progressing  6/21/2024 2239 by Leonora Kim RN  Outcome: Progressing     Problem: Skin/Tissue Integrity - Adult  Goal: Skin integrity remains intact  6/22/2024 0834 by Nadja Damon RN  Outcome: Progressing  Flowsheets (Taken 6/22/2024 0750)  Skin Integrity Remains Intact: Monitor for areas of redness and/or skin breakdown  6/21/2024 2239 by Leonora Kim RN  Outcome: Progressing  Flowsheets (Taken 6/21/2024 2207)  Skin Integrity Remains Intact: Monitor for areas of redness and/or skin breakdown  Goal: Incisions, wounds, or drain sites healing without S/S of infection  6/22/2024 0834 by Nadja Damon RN  Outcome: Progressing  Flowsheets (Taken 6/22/2024 0750)  Incisions, Wounds, or Drain Sites Healing Without Sign and Symptoms of Infection:   TWICE DAILY: Assess and document dressing/incision, wound bed, drain sites and surrounding tissue   Initiate pressure ulcer prevention bundle as indicated  6/21/2024 2239 by Leonora Kim RN  Outcome: Progressing  Goal: Oral mucous membranes remain intact  6/22/2024 0834 by Nadja Damon RN  Outcome: Progressing  Flowsheets (Taken 6/22/2024 0750)  Oral Mucous Membranes Remain Intact: Assess oral mucosa and hygiene

## 2024-06-24 NOTE — PROGRESS NOTES
HEMODIALYSIS PRE-TREATMENT NOTE    Patient Identifiers prior to treatment: Name Birthdate, MRN    Isolation Required: na                      Isolation Type: na       (please document if patient is being managed as a PUI/COVID-19 patient)        Hepatitis status:                           Date Drawn                             Result  Hepatitis B Surface Antigen 2/26/24     neg                     Hepatitis B Surface Antibody 2/26/24 Pos     24   Hepatitis B Core Antibody            How was Hepatitis Status verified: Labs and Epic     Was a copy of the labs you documented provided to facility for the patient's chart: yes    Hemodialysis orders verified: yes    Access Within normal limits ( I.e. s/s of infection,...): yes     Pre-Assessment completed: yes, Becca Martinez Rn    Pre-dialysis report received from: Massiel Chandra Rn                      Time: 1037    
  HEMODIALYSIS PRE-TREATMENT NOTE    Patient Identifiers prior to treatment: Name, Birthdate and Band    Isolation Required: na                      Isolation Type: na       (please document if patient is being managed as a PUI/COVID-19 patient)        Hepatitis status:                           Date Drawn                             Result  Hepatitis B Surface Antigen 02/26/2024     neg                     Hepatitis B Surface Antibody 02/26/2024 24 POS        Hepatitis B Core Antibody            How was Hepatitis Status verified: labs and chart     Was a copy of the labs you documented provided to facility for the patient's chart: yes    Hemodialysis orders verified: yes    Access Within normal limits ( I.e. s/s of infection,...): yes     Pre-Assessment completed: yes    Pre-dialysis report received from: Braydon Bragg                      Time: 0830    
Alycia nurse from [facility] called updated given writer requested med list to be faxed  
Dr David to the patient room; HGB level reviewed with surgeon.   
Dr. David called writer informed of patient being off unit for dialysis cath exchange. Writer informed patient will have dialysis after cath exchange. Dr. David states he is planning for surgery around 4-5pm  
Dr. Robinson called writer informed of possible time of dialysis cath exchange and dialysis.   
HEMODIALYSIS POST TREATMENT NOTE    Treatment time ordered: 210    Actual treatment time: 210    UltraFiltration Goal:   UltraFiltration Removed:       Pre Treatment weight: 63.3  Post Treatment weight: 61.3  Estimated Dry Weight: 62    Access used:     Central Venous Catheter:          Tunneled or Non-tunneled: Tunneled           Site: Left Groin          Access Flow: 400      Internal Access:       AV Fistula or AV Graft: na         Site: na       Access Flow: na       Sign and symptoms of infection: na       If YES: na    Medications or blood products given: See MAR    Chronic outpatient schedule: [facility]    Chronic outpatient unit: [facility]    Summary of response to treatment: Pt tx d/c all blood returned, Dialyzer streaked, both ports flushed, filled with NA Citrate, and sterile caps applied     Explain if orders NOT met, was physician notified:edelmira HANNON flowsheet faxed to patient unit/ placed in patient chart: yes    Post assessment completed: yes    Report given to: Massiel Chandra Rn      * Intra-treatment documented Safety Checks include the followin) Access and face visible at all times.     2) All connections and blood lines are secure with no kinks.     3) NVL alarm engaged.     4) Hemosafe device applied (if applicable).     5) No collapse of Arterial or Venous blood chambers.     6) All blood lines / pump segments in the air detectors.   
HEMODIALYSIS POST TREATMENT NOTE    Treatment time ordered: 210    Actual treatment time: 210    UltraFiltration Goal: 400  UltraFiltration Removed: 400      Pre Treatment weight: 61.2  Post Treatment weight: 60.8  Estimated Dry Weight: 62.0    Access used:     Central Venous Catheter:          Tunneled or Non-tunneled: tunneled           Site: left Upper leg          Access Flow: good      Internal Access:       AV Fistula or AV Graft: na         Site: na       Access Flow: na       Sign and symptoms of infection: no       If YES: na    Medications or blood products given: na    Chronic outpatient schedule: MWF    Chronic outpatient unit:  [facility]    Summary of response to treatment: the pt tolerated treatment well    Explain if orders NOT met, was physician notified:edelmira      ACES flowsheet faxed to patient unit/ placed in patient chart: yes    Post assessment completed: yes    Report given to: Braydon Bragg      * Intra-treatment documented Safety Checks include the followin) Access and face visible at all times.     2) All connections and blood lines are secure with no kinks.     3) NVL alarm engaged.     4) Hemosafe device applied (if applicable).     5) No collapse of Arterial or Venous blood chambers.     6) All blood lines / pump segments in the air detectors.   
Occupational Therapy  Facility/Department: [facility] PROGRESSIVE CARE  Occupational Therapy Initial Assessment    Name: Allegra Meza  : 1950  MRN: [id number]  Date of Service: 2024    RN reports patient is medically stable for therapy treatment this date.    Chart reviewed prior to treatment and patient is agreeable for therapy.  All lines intact and patient positioned comfortably at end of treatment.  All patient needs addressed prior to ending therapy session.      Discharge Recommendations:  Patient would benefit from continued therapy after discharge  Pt currently functioning below baseline.  Recommend daily inpatient skilled therapy at time of discharge to maximize long term outcomes and prevent re-admission. Please refer to AM-PAC score for current level of function.    OT Equipment Recommendations  Equipment Needed: Yes (CTA)       Patient Diagnosis(es): The encounter diagnosis was Closed fracture of right hip, initial encounter ([facility]).  Past Medical History:  has a past medical history of Anxiety, Asthma, Atrial fibrillation ([facility]), CAD (coronary artery disease), CHF (congestive heart failure) ([facility]), COPD (chronic obstructive pulmonary disease) ([facility]), DM (diabetes mellitus), type 2 ([facility]), ESRD on dialysis ([facility]), Fibromyalgia, Fibromyalgia, Hearing loss, Heart disease, Herpes simplex virus (HSV) infection, History of blood transfusion, Hypertension, Kidney failure, LVH (left ventricular hypertrophy), Mitral valve regurgitation, On home O2, Osteoporosis, PAD (peripheral artery disease) ([facility]), and Rheumatoid arthritis ([facility]).  Past Surgical History:  has a past surgical history that includes bladder repair;  section ();  section ();  section (); Hysterectomy; Lithotripsy; joint replacement; Tubal ligation; Tonsillectomy; Tympanoplasty; Coronary angioplasty with stent; pacemaker placement; AV fistula repair; and IR TUNNELED CVC PLACE WO SQ PORT/PUMP > 5 YEARS 
[facility]  Office: [phone]  Blue Warner DO, Jalil Medina DO, Maurice Webb DO, Vaibhav Hilliard DO, Preston Christensen MD, Kimmy Mckinney MD, Rosita Eaton MD, Madina Pan MD,  Rohan Espinoza MD, Geraldine Hopper MD, Talia Hernandez MD,  Rom Musa DO, Gil Treadwell MD, Chandu Serna MD, Davey Warner DO, Liz Mathew MD,  Michael Celaya DO, Milly Schwarz MD, Jaclyn Cummings MD, Swapna Ferguson MD, Amado Dove MD,  Marvin Kaufman MD, Claribel Ochoa MD, Bella Cox MD, Alvino Blas MD, Adolfo Donnelly MD, Cassie Alvarado MD, Ezio Barrios DO, Gilberto Mendieta DO, Yeimy Martin MD,  Jesu Damon MD, Shirley Waterhouse, CNP,  Елена Inman CNP, Devin Sepulveda, CNP,  Karyn Urena, DNP, Tracy Guerra, CNP, Marilin Wiggins, CNP, Donna Macario CNP, Lauren Vail, CNP, Danette Guadarrama, PA-C, Maya Bond PA-C, Violette Doty, CNP, Danielle Luna, CNP, Albert Meza, CNP, Margarita Valdivia, CNP, Tasha Gonzales, CNP, Aurora Benites, CNS, Lelia Wilson, CNP, Candy Williamson CNP, Tracy Schwab, CNP         [facility]   IN-PATIENT SERVICE   [facility]    Progress Note    6/23/2024    8:08 AM    Name:   Allegra Meza  MRN:     [id number]     Acct:      [id number]   Room:   2002/2002-02  IP Day:  3  Admit Date:  6/20/2024 12:23 PM    PCP:   Solomon Woodard MD  Code Status:  DNR-CCA    Subjective:     C/C:   Chief Complaint   Patient presents with    Fall    Hip Pain     right     Interval History Status: improved.     C/o significant pain at surgery site; had femur nailed 6/21.  States she does not want percocet though because she had become addicted to it previously, wants to just continue dilaudid.  Tried to explain to her that is just as addictive if not more so and we need to transition to po pain meds as it is time for discharge    At that, she stated she could not be discharged yet as she has too much pain and would not be able to sit in outpatient 
Patient off unit for dialysis cath exchange  
Patient returned to unit s/p dialysis cath exchange. Patient taken to Dialysis  
Patient still complaining of pain in the hip and asking for Dilaudid.  I did tell her that she is not going to get Dilaudid.  Her mobility is very poor.    Examination: The dressings were removed and a show there was very little drainage on the distal dressing the proximal dressing is completely dry.  New dry dressings will be applied after she has been washed.    There is no calf tenderness and moving her ankles normally.    Can resume Eliquis.    If discharged to [facility] see her in the Ortho clinic at [facility] in 10 days.  
Patient to surgery via bed  at bedside  
Patient transferred to room #2002 with all her belongings and meds that were locked up in stable condition. Oriented to room and call light/tv controls. Bed in lowest position, wheels locked, 2/4 side rails up  Call light in reach, room free of clutter, adequate lighting provided. Fresh water given.     
Physical Therapy  DATE: 2024    NAME: Allegra Meza  MRN: [id number]   : 1950    Patient not seen this date for Physical Therapy due to:      [] Cancel by RN or physician due to:    [x] Hemodialysis   off unit for dialysis then D/C at 4:30    [] Critical Lab Value Level     [] Blood transfusion in progress    [] Acute or unstable cardiovascular status   _MAP < 55 or more than >115  _HR < 40 or > 130    [] Acute or unstable pulmonary status   -FiO2 > 60%   _RR < 5 or >40    _O2 sats < 85%    [] Strict Bedrest    [] Off Unit for surgery or procedure    [] Off Unit for testing       [] Pending imaging to R/O fracture    [] Refusal by Patient      [] Other      [] PT being discontinued at this time. Patient independent. No further needs.     [] PT being discontinued at this time as the patient has been transferred to hospice care. No further needs.      Eve Gutierrez, PTA     
Physical Therapy  DATE: 2024    NAME: Allegra Meza  MRN: [id number]   : 1950    Patient not seen this date for Physical Therapy due to:      [] Cancel by RN or physician due to:    [x] Imaging revealed ACUTE Right intertrochanteric hip fracture, plan is surgery, will see post op     [] Critical Lab Value Level     [] Blood transfusion in progress    [] Acute or unstable cardiovascular status   _MAP < 55 or more than >115  _HR < 40 or > 130    [] Acute or unstable pulmonary status   -FiO2 > 60%   _RR < 5 or >40    _O2 sats < 85%    [] Strict Bedrest    [] Off Unit for surgery or procedure    [] Off Unit for testing       [] Pending imaging to R/O fracture    [] Refusal by Patient      [] Other      [] PT being discontinued at this time. Patient independent. No further needs.     [] PT being discontinued at this time as the patient has been transferred to hospice care. No further needs.      RYLAN SCHMID, PT    
Physical Therapy  Facility/Department: [facility] CARE  Physical Therapy Initial Assessment    Name: Allegra Meza  : 1950  MRN: [id number]  Date of Service: 2024    Discharge Recommendations:  Patient would benefit from continued therapy after discharge    Pt currently functioning below baseline.  Recommend daily inpatient skilled therapy at time of discharge to maximize long term outcomes and prevent re-admission. Please refer to AM-PAC score for current level of function.      PER HPI: Allegra Meza is a 73 y.o. Non- / non  female who presents with Fall and Hip Pain (right)   and is admitted to the hospital for the management of Closed right hip fracture, initial encounter (HCC).     This 73 yom presents with a right hip fracture.  She had gone to an appointment at the office next door and lost her balance stepping onto curb.  She fell to the ground and then had right hip pain-xrays reveal fracture.  She lives at an [facility] and is ambulatory at baseline but does not get around very well.  Her  had taken her to appointment as there was a cancellation and they were called to come in early     underwent RIGHT FEMUR CEPHOMEDULARY NAIL       Patient Diagnosis(es): The encounter diagnosis was Closed fracture of right hip, initial encounter (HCC).  Past Medical History:  has a past medical history of Anxiety, Asthma, Atrial fibrillation ([facility]), CAD (coronary artery disease), CHF (congestive heart failure) ([facility]), COPD (chronic obstructive pulmonary disease) ([facility]), DM (diabetes mellitus), type 2 ([facility]), ESRD on dialysis ([facility]), Fibromyalgia, Fibromyalgia, Hearing loss, Heart disease, Herpes simplex virus (HSV) infection, History of blood transfusion, Hypertension, Kidney failure, LVH (left ventricular hypertrophy), Mitral valve regurgitation, On home O2, Osteoporosis, PAD (peripheral artery disease) ([facility]), and Rheumatoid arthritis ([facility]).  Past Surgical History:  has a past 
Physical Therapy  Facility/Department: [facility] MED SURG  Daily Treatment Note  NAME: Allegra Meza  : 1950  MRN: [id number]    Date of Service: 2024    Discharge Recommendations:  Patient would benefit from continued therapy after discharge        Patient Diagnosis(es): The encounter diagnosis was Closed fracture of right hip, initial encounter (HCC).    Assessment   Assessment: Treatment limited by pain and decreased cognition; patient able to stand using letty stedy for 2-3 min x2 with mod-max assist and cues to maintain upright posture; increased education throughout with poor carryvoer secondary to decreased confusion. Patient would benefit from continued therapy in order to promote increased return to prior level.    Activity Tolerance: Treatment limited secondary to decreased cognition; Patient limited by pain     Plan    Physical Therapy Plan  General Plan: 5-7 times per week  Current Treatment Recommendations: Balance training; Endurance training; Strengthening; Gait training; Transfer training; Co-Treatment; Positioning; Therapeutic activities; Patient/Caregiver education & training; Safety education & training     Restrictions  Restrictions/Precautions  Restrictions/Precautions: General Precautions  Position Activity Restriction  Other position/activity restrictions: R WBAT     Subjective    Subjective  Subjective: Patient in bed upon arrivan and nurse HILARIO reports patient appropiate for therapy and has recently recieved pain medication.  Cognition  Overall Cognitive Status: Exceptions  Following Commands: Inconsistently follows commands  Memory: Decreased short term memory  Safety Judgement: Decreased awareness of need for assistance; Decreased awareness of need for safety  Problem Solving: Assistance required to identify errors made; Assistance required to correct errors made;Assistance required to implement solutions  Initiation: Requires cues for all  Sequencing: Requires cues for 
[facility]  Office: [phone]  Blue Warner DO, Jalil Medina DO, Maurice Webb DO, Vaibhav Hilliard DO, Preston Christensen MD, Kimmy Mckinney MD, Rosita Eaton MD, Madina Pan MD,  Rohan Espinoza MD, Geraldine Hopper MD, Talia Hernandez MD,  Rom Musa DO, Gil Treadwell MD, Chandu Serna MD, Davey Warner DO, Liz Mathew MD,  Michael Celaya DO, Milly Schwarz MD, Jaclyn Cummings MD, Swapna Ferguson MD, Amado Dove MD,  Marvin Kaufman MD, Claribel Ochoa MD, Bella Cox MD, Alvino Blas MD, Adolfo Donnelly MD, Cassie Alvarado MD, Ezio Barrios DO, Gilberto Mendieta DO, Yeimy Martin MD,  Jesu Damon MD, Shirley Waterhouse, CNP,  Елена Inman CNP, Devin Sepulveda, CNP,  Karyn Urena, DNP, Tracy Guerra, CNP, Marilin Wiggins, CNP, Donna Macario CNP, Lauren Vail, CNP, Danette Guadarrama, PA-C, Maya Bond PA-C, Violette Doty, CNP, Danielle Luna, CNP, Albert Meza, CNP, Margarita Valdivia, CNP, Tasha Gonzales, CNP, Aurora Benites, CNS, Lelia Wilson, CNP, Candy Williamson CNP, Tracy Schwab, CNP         [facility]   IN-PATIENT SERVICE   [facility]    Progress Note    6/22/2024    10:56 AM    Name:   Allegra Meza  MRN:     [id number]     Acct:      [id number]   Room:   1011/1011-02   Day:  2  Admit Date:  6/20/2024 12:23 PM    PCP:   Solomon Woodard MD  Code Status:  DNR-CCA    Subjective:     C/C:   Chief Complaint   Patient presents with    Fall    Hip Pain     right     Interval History Status: improved.     Although still having pain, it is not as severe as before; had femur nailed 6/21    Denies cp/sob/n/v    Brief History:     Allegra Meza is a 73 y.o. Non- / non  female who presents with Fall and Hip Pain (right)   and is admitted to the hospital for the management of Closed right hip fracture, initial encounter (HCC).     This 73 yom presents with a right hip fracture.  She had gone to an appointment at the office next door 
Pt arrived to floor via ED.  Vital signs stable.  Admission database complete.  Pt alert and oriented X3.  Pt came from [facility], currently waiting for updated medication list.   Safety measures in place, call light within reach, all concerns addressed at this time.   
Pt discharged to [facility] in stable condition with belongings  Discharge instructions given.  \"Meds To Beds\" medication at bedside  Pt denies having any further questions at this time  Personal items given to patient at discharge  Patient state they have everything they were admitted with.  Dressing change supplies and hibiclens sent home with patient.  Report called to nurse Bernard.  
Reason for Follow up: ESRD, hyperkalemia    Assessment:    End stage renal disease.  Hyperkalemia   Hyponatremia.  Metabolic bone disorder   Hypertension, in pain.  R hip fracture s/p fall  ORIF  Anemia of ESRD     Plan:    Lokelma 10 g today  Recheck a BMP at 4 PM today  Patient needs fluid restriction 1200 mL per 24 hours due to hyponatremia  Renal diet with low potassium diet  Hemodialysis planned in a.m.  Patient has left tunneled dialysis catheter which has been exchanged   Renvela 1600 mg p.o. 3 times daily with meals as a phosphorus binders  Serum phosphorus goal is 5.0 or less  Erythropoiesis stimulating agents will be given with target hemoglobin of 10   Avoid Lovenox.  We will continue dialysis on MWF schedule.  Follow-up labs in a.m.     Please do not hesitate to call with questions.     SUBJECTIVE:      Patient seen and examined the bedside   She is standing with the help of physical therapy at the bedside   Patient is complaining of the pain   Pain control with meds per orders   Patient denies any dyspnea   Her serum potassium was 5.6   Lokelma ordered   Serum sodium is 127    Review Of Systems:     Review of systems positive for right hip pain    Scheduled Meds:   apixaban  5 mg Oral BID    polyethylene glycol  17 g Oral Daily    carvedilol  25 mg Oral BID WC    pantoprazole  40 mg Oral QAM AC    prednisoLONE acetate  1 drop Left Eye TID    Vitamin D  1,000 Units Oral Daily    dilTIAZem  360 mg Oral Daily    insulin lispro  0-8 Units SubCUTAneous TID WC    insulin lispro  0-4 Units SubCUTAneous Nightly    latanoprost  1 drop Left Eye Nightly    leflunomide  20 mg Oral Daily    levothyroxine  50 mcg Oral Daily    insulin glargine  15 Units SubCUTAneous Nightly    acyclovir  200 mg Oral BID    loperamide  2 mg Oral Once per day on Mon Wed Fri    sertraline  25 mg Oral Daily   Continuous Infusions:   dextrose       PRN Meds:traMADol **OR** traMADol, docusate sodium, anticoagulant sodium citrate, 
Reason for Follow up: ESRD, hyperkalemia    Assessment:    End stage renal disease.  Hyperkalemia   Hyponatremia.  Metabolic bone disorder   Hypertension, in pain.  R hip fracture s/p fall  ORIF  Anemia of ESRD     Plan:    Patient seen and examined during the dialysis   See dialysis orders   Patient has hyperkalemia which will be addressed with her dialysis today   Renal diet, low potassium diet, fluid restriction of 1200 mL per 24 hours   Patient is hemodynamically stable  Patient has left thigh tunneled dialysis catheter which has been exchanged   Renvela 1600 mg p.o. 3 times daily with meals as a phosphorus binders  Erythropoiesis stimulating agents will be given with target hemoglobin of 10   Avoid Lovenox.  We will continue dialysis on MWF schedule.  Follow-up labs in a.m.     Please do not hesitate to call with questions.     SUBJECTIVE:      Patient seen and examined during hemodialysis treatment  She denies any dyspnea  She has new left thigh tunneled Allises catheter in place  Labs Meds reviewed    Review Of Systems:     Review of systems positive for right hip pain under control    Scheduled Meds:   apixaban  5 mg Oral BID    sevelamer  1,600 mg Oral TID WC    polyethylene glycol  17 g Oral Daily    carvedilol  25 mg Oral BID WC    pantoprazole  40 mg Oral QAM AC    prednisoLONE acetate  1 drop Left Eye TID    Vitamin D  1,000 Units Oral Daily    dilTIAZem  360 mg Oral Daily    insulin lispro  0-8 Units SubCUTAneous TID WC    insulin lispro  0-4 Units SubCUTAneous Nightly    latanoprost  1 drop Left Eye Nightly    leflunomide  20 mg Oral Daily    levothyroxine  50 mcg Oral Daily    insulin glargine  15 Units SubCUTAneous Nightly    acyclovir  200 mg Oral BID    loperamide  2 mg Oral Once per day on Mon Wed Fri    sertraline  25 mg Oral Daily   Continuous Infusions:   dextrose       PRN Meds:HYDROmorphone, acetaminophen **OR** acetaminophen, diphenhydrAMINE, docusate sodium, anticoagulant sodium citrate, 
Reason for Follow up: ESRD.    Assessment:  End stage renal disease.  Hyponatremia.  Hyperphosphatemia.  Hypertension, in pain.  R hip fracture s/p fall.     Plan:  IR to replace her HD catheter, followed by HD.  We will follow phosphorus level and start binders as needed.  We will follow H&H and start erythropoeitin stimulating agent as needed.  Acid Base status stable and at target.  K at target.  Monitor BP.  Avoid Lovenox.  We will continue dialysis on MWF schedule.  Presently NPO. Place on renal diet with 1000 ml oral fluid restriction when allowed to eat.  We will follow up chemistries daily in AM.    Please do not hesitate to call with questions. We will follow with you.    SUBJECTIVE:    C/o pain at her fracture site.    Review Of Systems:   Constitutional: No fever, chills, lethargy, weakness or wt loss.  Cardiac:  No chest pain, dyspnea, orthopnea or PND.  Pulmonary:  No cough, phlegm or wheezing.  Abdomen:  No abdominal pain, nausea, vomiting or diarrhea.  :   No hematuria, pyuria, dysuria or flank pain.  Extremities:  No swelling or joint pains.    Scheduled Meds:   ceFAZolin  1,000 mg IntraVENous Once    [Held by provider] apixaban  5 mg Oral BID    dilTIAZem  360 mg Oral Daily    insulin glargine  25 Units SubCUTAneous Nightly    insulin lispro  0-8 Units SubCUTAneous TID WC    insulin lispro  0-4 Units SubCUTAneous Nightly    latanoprost  1 drop Left Eye Nightly    leflunomide  20 mg Oral Daily    levothyroxine  50 mcg Oral Daily    pantoprazole  40 mg Oral QAM AC    PARoxetine  20 mg Oral QAM    prednisoLONE acetate  1 drop Left Eye 4x Daily    risperiDONE  0.5 mg Oral Daily    Vitamin D  1,000 Units Oral Daily    sodium chloride flush  5-40 mL IntraVENous 2 times per day    carvedilol  25 mg Oral BID WC    methylPREDNISolone acetate  40 mg IntraMUSCular Once    lidocaine PF  10 mL IntraDERmal Once    methylPREDNISolone acetate  40 mg IntraMUSCular Once   Continuous Infusions:   sodium chloride   
Reason for Follow up: ESRD.    Assessment:  End stage renal disease.  Hyponatremia.  Hyperphosphatemia.  Hypertension, in pain.  R hip fracture s/p fall.  Anemia of ESRD     Plan:  New HD catheter is in place, HD completed yesterday  We will follow phosphorus level and start binders as needed.  We will follow H&H and start erythropoeitin stimulating agent as needed.  Acid Base status stable and at target.  K at target.  Monitor BP.  Avoid Lovenox.  We will continue dialysis on MWF schedule.  Encourage renal diet with 1000 ml oral fluid restriction  We will follow up chemistries daily in AM.    Please do not hesitate to call with questions. We will follow with you.    SUBJECTIVE:    C/o stable post op hip pain  HD well tolerated yesterday  No dyspnea    Review Of Systems:   Constitutional: No fever, chills, lethargy, weakness or wt loss.  Cardiac:  No chest pain, dyspnea, orthopnea or PND.  Pulmonary:  No cough, phlegm or wheezing.  Abdomen:  No abdominal pain, nausea, vomiting or diarrhea.  :   No hematuria, pyuria, dysuria or flank pain.  Extremities:  + hip pain    Scheduled Meds:   polyethylene glycol  17 g Oral Daily    carvedilol  25 mg Oral BID WC    pantoprazole  40 mg Oral QAM AC    prednisoLONE acetate  1 drop Left Eye TID    Vitamin D  1,000 Units Oral Daily    [START ON 6/23/2024] apixaban  5 mg Oral BID    dilTIAZem  360 mg Oral Daily    insulin lispro  0-8 Units SubCUTAneous TID     insulin lispro  0-4 Units SubCUTAneous Nightly    latanoprost  1 drop Left Eye Nightly    leflunomide  20 mg Oral Daily    levothyroxine  50 mcg Oral Daily    insulin glargine  15 Units SubCUTAneous Nightly    acyclovir  200 mg Oral BID    loperamide  2 mg Oral Once per day on Mon Wed Fri    sertraline  25 mg Oral Daily   Continuous Infusions:   dextrose       PRN Meds:oxyCODONE-acetaminophen, docusate sodium, anticoagulant sodium citrate, anticoagulant sodium citrate, HYDROmorphone, glucagon (rDNA), dextrose, glucose, 
SPIRITUAL CARE DEPARTMENT [facility]   06/20/24 2043   Encounter Summary   Encounter Overview/Reason Initial Encounter;Spiritual/Emotional Needs   Service Provided For Patient   Referral/Consult From Other    Support System Unknown   Last Encounter  06/20/24   Complexity of Encounter Low   Begin Time 2030   End Time  2040   Total Time Calculated 10 min   Spiritual/Emotional needs   Type Spiritual Support   Assessment/Intervention/Outcome   Assessment Anxious;Fearful;Powerlessness;Other (Comment)  (Pt. reported being in a lot of pain)   Intervention Active listening;Prayer (assurance of)/[place]   Outcome Expressed Gratitude   Plan and Referrals   Plan/Referrals Continue Support (comment)       PROGRESS NOTE    Room # 1011/1011-02   Name: Allegra Meza            Sikh: Non-Practicing Faith     Reason for visit:  Consult    I visited the patient.    Admit Date & Time: 6/20/2024 12:23 PM    Assessment:  Allegra Meza is a 73 y.o. female in the hospital because Fracture of Right Hip. Upon entering the room Pt. Reported being in a lot of pain.      Intervention:  I introduced myself and my title as  I offered space for Pt.  to express feelings, needs, and concerns and provided a ministry presence. Writer presented Pt. With Prayer Card and Prayed with Pt.    Outcome:  Pt. Expressed Appreciation for Visit.    Plan:  Chaplains will remain available to offer spiritual and emotional support as needed.    Electronically signed by Chaplain China, on 6/20/2024 at 8:47 PM.  Spiritual Care Department  [facility]      
SPOKE WITH PHARMACIST ON STAFF REGARDING THE REACTION BETWEEN BUPIVICAINE WITH EPINEPHERINE AND ONE OF THEIR ALLERGIES: PHENYLEPHERINE-GUAIFENESIN. THE PHARMACIST ENSURED THAT I DID NOT NEED TO WORRY ABOUT A REACTION.  
The patient arrived to the room as transfer from PCU; awake and alert and asking for pain medication. BP low; the patient is asymptomatic. Oriented to the room, call light, safety and bed mechanics.   
This 73-year-old patient is seen here because of an injury she sustained to right hip.    The patient got out of the car and was getting her wheelchair and tripped and fell.  According the family she tripped over the curb.  However she came to the emergency room complaining of severe pain in the right hip.    Patient has multiple comorbidities including diabetes heart disease kidney failure and is on dialysis mitral valve regurgitation rheumatoid arthritis coronary artery disease congestive heart failure and COPD.    Her surgical past history includes  section hysterectomy left total knee arthroplasty lithotripsy pacemaker and tonsillectomy.    Patient is on multiple medications which were reviewed.  He is also on Eliquis.  She gets her dialysis on  and Friday.    Examination: She lives with the right hip flexed abducted and externally rotated.  He is able to move her ankle normally.  Sensation is normal.    X-rays: Views of the right hip show she has a intertrochanteric fracture.    Plan is I will schedule her for surgery in the afternoon and hopefully she will and her dialysis by that time.  This is of course depending on medical and if necessary cardiac clearance.          
Transitions of Care Pharmacy Service   Medication Review    The patient's list of current home medications has been reviewed/updated.     Source(s) of information: med list from facility ([facility])      PROVIDER ACTION REQUESTED  Medications that need to be addressed by a physician/nurse practitioner:    Medication Action Requested   Lantus 25 units   Current facility dose is 15 units daily instead -- consider adjusting order     Prednisolone 1% eye drops QID to left eye   Current facility dose is TID in left eye instead -- consider adjusting order   Paxil 20mg,  Risperdal 0.5mg   Not current meds at facility -- consider discontinuing orders    The rest of her med list is ready for reconciliation (Zoloft, acyclovir, etc)           Please feel free to call me with any questions about this encounter. Thank you.    Liz Davis [facility]   Transitions of Care Pharmacy Service  Phone:  [phone]  Fax: [phone]      Electronically signed by Liz Davis [facility] on 6/21/2024 at 6:57 PM         Medications Prior to Admission:   acetaminophen (TYLENOL) 500 MG tablet, Take 1 tablet by mouth every 4 hours as needed for Pain  polyethylene glycol (GLYCOLAX) 17 g packet, Take 1 packet by mouth daily as needed for Constipation  loperamide (IMODIUM A-D) 2 MG tablet, Take 1 tablet by mouth three times a week Indications: Mon/Wed/Fri  lidocaine (XYLOCAINE) 2 % jelly, Apply topically 2 times daily To coccyx  zinc oxide (TRIAD HYDROPHILIC) PSTE paste, Apply topically 2 times daily To coccyx (mix with lidocaine 50/50)  sertraline (ZOLOFT) 25 MG tablet, Take 1 tablet by mouth daily  carvedilol (COREG) 25 MG tablet, Take 1 tablet by mouth 2 times daily  ALPRAZolam (XANAX) 1 MG tablet, Take 1 tablet by mouth 3 times daily as needed for Anxiety. Max Daily Amount: 3 mg  apixaban (ELIQUIS) 5 MG TABS tablet, Take 1 tablet by mouth 2 times daily  acyclovir (ZOVIRAX) 200 MG capsule, Take 1 capsule by mouth 2 times 
Writer called informed Dr. David about patient not having dialysis cath exchange until this afternoon and dialysis after. Writer also informed of last dose of Eliquis being yesterday. Dr. David states he may have to cancel surgery and reschedule for tomorrow.   
Writer called spoke with Syeda in surgery dept to inform of poss surgery being rescheduled/cancelled until after Dialysis cath exchange and dialysis treatment. Writer informed of speaking with Dr David via phone. Dr David states he is in surgery but will call when he has a moment.   
[] Medication Reconciliation was completed and the patient's home medication list was verified. The Med List Status is \"Complete\". The following sources were used to assist with Medication Reconciliation:    [] Med Rec Pharmacist already completed  [] Patient had a list of medications which was transcribed into the EHR.  [] Patient provided bottles of their medications  [] Home medications reviewed and confirmed with   [] Contacted patient's pharmacy to confirm home medications  [] Contacted patient's physician office to confirm home medications  [] Medical Records from another facility and/or Care Everywhere were reviewed  [x] MAR from facility requested to be faxed over  [] Unable to complete due to patient condition  [] Unable to validate med reconciliation      [x] There are one or more home medications that need clarification before Medication Reconciliation can be completed. The Med List Status has been marked as In Progress.     To assist with Home Medication Reconciliation the following actions have been taken:    [] Pharmacy medication reconciliation service requested. (Note: This can be done by sending a Perfect Serve message to The Med Rec Pharmacist or by phoning [phone].)  [] Family requested to bring medications into the hospital  [] Family requested to call hospital with medication list  [] Message left with physician office  [] Request for medical records made to n/a  [] Other n/a        
or codeine due to allergies    Denies cp/sob/n/v    Brief History:     Allegra Meza is a 73 y.o. Non- / non  female who presents with Fall and Hip Pain (right)   and is admitted to the hospital for the management of Closed right hip fracture, initial encounter ([facility]).     This 73 yom presents with a right hip fracture.  She had gone to an appointment at the office next door and lost her balance stepping onto curb.  She fell to the ground and then had right hip pain-xrays reveal fracture.  She lives at an [facility] and is ambulatory at baseline but does not get around very well.  Her  had taken her to appointment as there was a cancellation and they were called to come in early    Femur nailing done 6/21    Dialysis catheter exchange done 6/21    Review of Systems:     Constitutional:  negative for chills, fevers, sweats  Respiratory:  negative for cough, dyspnea on exertion, shortness of breath, wheezing  Cardiovascular:  negative for chest pain, chest pressure/discomfort, lower extremity edema, palpitations  Gastrointestinal:  negative for abdominal pain, diarrhea, nausea, vomiting  Neurological:  negative for dizziness, headache    Medications:     Allergies:    Allergies   Allergen Reactions    Amlodipine Swelling    Betamethasone Other (See Comments)     States \"passed out\"    Diclofenac-Misoprostol Hives    Penicillins Rash and Hives    Phenylephrine-Guaifenesin Hives    Propranolol Hives    Quetiapine Anxiety     States caused anxiety attacks    Adhesive Tape     Clonidine Derivatives     Cymbalta [Duloxetine Hcl]     Entex T [Pseudoephedrine-Guaifenesin]     Inderal La [Propranolol Hcl]     Mometasone Other (See Comments)    Codeine Anxiety, Headaches and Other (See Comments)     States makes her jittery    Morphine Headaches and Nausea Only     \"makes me sick to my stomach\"    Sulfamethoxazole-Trimethoprim Other (See Comments)     UNKNOWN    Tapentadol Anxiety       Current Meds: 
mg Oral QAM    prednisoLONE acetate  1 drop Left Eye 4x Daily    risperiDONE  0.5 mg Oral Daily    Vitamin D  1,000 Units Oral Daily    sodium chloride flush  5-40 mL IntraVENous 2 times per day    carvedilol  25 mg Oral BID WC    methylPREDNISolone acetate  40 mg IntraMUSCular Once    lidocaine PF  10 mL IntraDERmal Once    methylPREDNISolone acetate  40 mg IntraMUSCular Once     Continuous Infusions:    sodium chloride      dextrose       PRN Meds: HYDROmorphone, heparin (porcine), ALPRAZolam, sodium chloride flush, sodium chloride, ondansetron **OR** ondansetron, polyethylene glycol, acetaminophen **OR** acetaminophen, glucose, dextrose bolus **OR** dextrose bolus, glucagon (rDNA), dextrose    Data:     Past Medical History:   has a past medical history of Anxiety, Asthma, Atrial fibrillation ([facility]), CAD (coronary artery disease), CHF (congestive heart failure) ([facility]), COPD (chronic obstructive pulmonary disease) ([facility]), DM (diabetes mellitus), type 2 ([facility]), ESRD on dialysis ([facility]), Fibromyalgia, Fibromyalgia, Hearing loss, Heart disease, Herpes simplex virus (HSV) infection, History of blood transfusion, Hypertension, Kidney failure, LVH (left ventricular hypertrophy), Mitral valve regurgitation, On home O2, Osteoporosis, PAD (peripheral artery disease) ([facility]), and Rheumatoid arthritis ([facility]).    Social History:   reports that she has quit smoking. Her smoking use included cigarettes. She has never used smokeless tobacco. She reports that she does not currently use alcohol. She reports that she does not use drugs.     Family History:   Family History   Problem Relation Age of Onset    Prostate Cancer Father     Hypertension Mother     Heart Attack Brother        Vitals:  BP (!) 167/76   Pulse 70   Temp 98.1 °F (36.7 °C) (Oral)   Resp 20   Ht 1.702 m (5' 7\")   Wt 64 kg (141 lb 1.5 oz)   SpO2 94%   BMI 22.10 kg/m²   Temp (24hrs), Av °F (36.7 °C), Min:97.7 °F (36.5 °C), Max:98.2 °F (36.8 °C)    Recent Labs     
movement. Pt then req'd MAX A to go from sit to supine and position in center of bed. Pt resistive due to pain and cognitive deficits. Bed alarm on and call light and all belongings in reach.       Co-treatment with PT warranted secondary to decreased safety and independence requiring 2 skilled therapy professionals to address individual discipline's goals. OT addressing preparation for ADL transfer, sitting balance for increased ADL performance, sitting/activity tolerance, functional reaching, environmental safety/scanning, fall prevention, functional mobility for ADL transfers, ability to sequence and follow directions, bed mobility tech, and functional UE strength.      AMILCAR Hannon

## 2024-06-24 NOTE — CARE COORDINATION
Social Work-[facility] letter provided to patient.  Patient offered four hours to make informed decision regarding appeal process; patient agreeable to discharge.  Jimmy

## 2024-06-24 NOTE — CARE COORDINATION
Social Work-[facility] will admit today. Orders faxed. Nurse to call report [phone]. Notified . Patient and  are agreeable. DEANNA faxed to [facility].

## 2024-06-24 NOTE — DISCHARGE SUMMARY
[facility]  Office: [phone]  Blue Warner DO, Jalil Medina DO, Maurice Webb DO, Vaibhav Hilliard DO, Preston Christensen MD, Kimmy Mckinney MD, Rosita Eaton MD, Madina Pan MD,  Rohan Espinoza MD, Geraldine Hopper MD, Talia Hernandez MD,  Rom Musa DO, Gil Treadwell MD, Chandu Serna MD, Davey Warner DO, Liz Mathew MD,  Michael Celaya DO, Milly Schwarz MD, Jaclyn Cummings MD, Swapna Ferguson MD, Amado Dove MD,  Marvin Kaufman MD, Claribel Ochoa MD, Bella Cox MD, Alvino Blas MD, Adolfo Donnelly MD, Cassie Alvarado MD, Ezio Barrios DO, Gilberto Mendieta DO, Yeimy Martin MD,  Jesu Damon MD, Shirley Waterhouse, CNP,  Елена Inman CNP, Devin Sepulveda, CNP,  Karyn Urena, SHAWANDA, Tracy Guerra, CNP, Marilin Wiggins, CNP, Donna Macario CNP, Lauren Vail, CNP, Danette Guadarrama, PA-C, Maya Bond PA-C, Violette Doty, CNP, Danielle Luna, CNP, Albert Meza, CNP, Margarita Valdivia, CNP, Tasha Gonzales, CNP, Aurora Benites, CNS, Lelia Wilson, CNP, Candy Williamson CNP, Tracy Schwab, CNP         [facility]   IN-PATIENT SERVICE   [facility]    Discharge Summary     Patient ID: Allegra Meza  :  1950   MRN: [id number]     ACCOUNT:  [id number]   Patient's PCP: Solomon Woodard MD  Admit Date: 2024   Discharge Date: 2024     Length of Stay: 4  Code Status:  DNR-CCA  Admitting Physician: Vaibhav Hilliard DO  Discharge Physician: Vaibhav Hilliard DO     Active Discharge Diagnoses:     Hospital Problem Lists:  Principal Problem:    Closed fracture of right hip (HCC)  Active Problems:    Presence of cardiac pacemaker    Rheumatoid arthritis (HCC)    Peripheral vascular disorder due to diabetes mellitus (HCC)    Obstructive sleep apnea syndrome    Primary hypertension    End stage renal failure on dialysis (HCC)  Resolved Problems:    * No resolved hospital problems. *      Admission Condition:  poor     Discharged Condition:

## 2024-07-09 ENCOUNTER — OFFICE VISIT (OUTPATIENT)
Dept: ORTHOPEDIC SURGERY | Age: 74
End: 2024-07-09

## 2024-07-09 VITALS — WEIGHT: 135 LBS | HEIGHT: 67 IN | BODY MASS INDEX: 21.19 KG/M2

## 2024-07-09 DIAGNOSIS — S72.001D CLOSED FRACTURE OF RIGHT HIP WITH ROUTINE HEALING, SUBSEQUENT ENCOUNTER: Primary | ICD-10-CM

## 2024-07-09 PROCEDURE — 99024 POSTOP FOLLOW-UP VISIT: CPT | Performed by: ORTHOPAEDIC SURGERY

## 2024-07-09 NOTE — PROGRESS NOTES
This patient will cephalomedullary nail insertion for intertrochanteric fracture of the right hip is seen here in follow-up.  All the time the patient was here in the clinic demanded for stronger pain medication specifically Dilaudid.  This was happening also when she was hospitalized.  The patient apparently used addicted to it.  We did contact the facilities and they told us that they gave her Dilaudid 2 mg for the first 3 days and now they are only giving her Tylenol 504 H as needed.    She is getting physical therapy at the facilities.    Examination: Incisions are all well-healed.  She has good motion.    X-rays: Further x-rays today show the fracture remains reduced and the hardware is intact.    Treatment: I have advised rehab facilities to continue physical therapy with full weightbearing.  Return here on 7/22/2024.

## 2024-07-24 ENCOUNTER — OFFICE VISIT (OUTPATIENT)
Dept: ORTHOPEDIC SURGERY | Age: 74
End: 2024-07-24

## 2024-07-24 VITALS — HEIGHT: 67 IN | BODY MASS INDEX: 21.19 KG/M2 | WEIGHT: 135 LBS

## 2024-07-24 DIAGNOSIS — S72.001D CLOSED FRACTURE OF RIGHT HIP WITH ROUTINE HEALING, SUBSEQUENT ENCOUNTER: Primary | ICD-10-CM

## 2024-07-24 PROCEDURE — 99024 POSTOP FOLLOW-UP VISIT: CPT | Performed by: STUDENT IN AN ORGANIZED HEALTH CARE EDUCATION/TRAINING PROGRAM

## 2024-07-24 NOTE — PROGRESS NOTES
[facility]  [address]  [place] [address]  Dept: [phone]  Dept Fax: [phone]        Ambulatory   Follow Up    Subjective:   Allegra Meza is a 74year oldfemale who presents to our office today for continued evaluation of their right hip IMN DOS 6/21/2024 by Dr. David.  Patient has been in a rehab facility and has not been working with physical therapy per her report.  She states that she still is having some pain on her right hip. She states that prior to her fall she uses a rolling walker to ambulate but now she barely has gotten up to ambulate.    Review of Systems   Constitutional: Negative for Fever and Chills.   HENT: Negative for Congestion.    Eyes: Negative for Blurred Vision and Double Vision.   Respiratory: Negative for Cough, Shortness of Breath and Wheezing.    Cardiovascular: Negative for Chest Pain and Palpitations.   Gastrointestinal: Negative for Nausea. Negative for Vomiting.   Musculoskeletal: Positive for Joint Pain (right hip).   Skin: Negative for Itching and Rash.   Neurological: Negative for Dizziness, Sensory Change and Headaches.   Psychiatric/Behavioral: Negative for Depression and Suicidal Ideas.     Objective:   General: AAOx3, NAD.  Ortho Exam  Neuro: Alert. Oriented.  Eyes: Extra-Ocular Muscles Intact.  Mouth: Oral Mucosa Moist. No Perioral Lesions.  Pulm: Respirations Unlabored and Regular.  Skin: Warm, Well Perfused.  Psych: Patient has good fund of knowledge and displays understanding of Exam, Diagnosis, and Plan.  MSK:   RLE: Skin intact with prior surgical incisions and scabbing over without erythema or concerns for infection. Compartments soft. 2+ DP/PT pulses with BCR. TA/EHL/FHL/GS motor intact. Saph/Rupali/DP/SP nerves SILT.    Radiology:   History:  Right IT fracture 6/20/2024    Comparison:   Prior x-rays from 7/9/2024 of the right hip    Findings:   2 radiographic views (AP,

## 2024-08-21 ENCOUNTER — OFFICE VISIT (OUTPATIENT)
Dept: ORTHOPEDIC SURGERY | Age: 74
End: 2024-08-21

## 2024-08-21 VITALS — BODY MASS INDEX: 21.14 KG/M2 | HEIGHT: 67 IN

## 2024-08-21 DIAGNOSIS — G89.18 POST-OP PAIN: Primary | ICD-10-CM

## 2024-08-21 DIAGNOSIS — S72.001D CLOSED FRACTURE OF RIGHT HIP WITH ROUTINE HEALING, SUBSEQUENT ENCOUNTER: ICD-10-CM

## 2024-08-21 PROCEDURE — 99024 POSTOP FOLLOW-UP VISIT: CPT | Performed by: STUDENT IN AN ORGANIZED HEALTH CARE EDUCATION/TRAINING PROGRAM

## 2024-08-21 RX ORDER — TRAMADOL HYDROCHLORIDE 50 MG/1
50 TABLET ORAL EVERY 6 HOURS PRN
Qty: 20 TABLET | Refills: 0 | Status: SHIPPED | OUTPATIENT
Start: 2024-08-21 | End: 2024-08-28

## 2024-08-21 NOTE — PROGRESS NOTES
[facility]  [address]  [place] [address]  Dept: [phone]  Dept Fax: [phone]        Ambulatory Follow Up    Subjective:   DOS: 6/21/2024: Right short cephalomedullary nail.    HPI:    Allegra Meza is a 74 y.o. year old female who presents to our office today for routine follow-up regarding right hip short cephalomedullary nail.  Date of surgery: 6/21/2024.  When patient was previously seen in our clinic on 7/20/2024, she stated that she had not been working with physical therapy at all at her facility.  We did provide her a note instructing her to ambulate daily with physical therapy, however she reports that she is still not been able to work with physical therapy.  Patient states she is continue to have pain in the right hip that radiates down the leg.  She states she has not ambulated since surgery.  Patient denies any new numbness or tingling in the right lower extremity.  Patient denies any new injuries.     Review of Systems:  Constitutional: Negative for fever and chills.   HENT: Negative for congestion.    Eyes: Negative for blurred vision and double vision.   Respiratory: Negative for cough, shortness of breath and wheezing.    Cardiovascular: Negative for chest pain and palpitations.   Gastrointestinal: Negative for nausea. Negative for vomiting.   Musculoskeletal: Per HPI  Skin: Negative for itching and rash.   Neurological: Negative for dizziness, sensory change and headaches.   Psychiatric/Behavioral: Negative for depression and suicidal ideas.       Objective :   General: AAOx3, NAD, appears stated age  CV: no obvious JVD, distal pulses 2+  Respiratory: chest rise symmetric, unlabored respirations, no audible wheezing  Skin: warm, well perfused, no obvious rashes or lesions  Psych: Patient displays understanding of exam, diagnosis, and plan.    MSK:     RLE: Well approximated surgical incisions about the

## 2024-10-16 ENCOUNTER — OFFICE VISIT (OUTPATIENT)
Dept: ORTHOPEDIC SURGERY | Age: 74
End: 2024-10-16
Payer: COMMERCIAL

## 2024-10-16 DIAGNOSIS — S72.001D CLOSED FRACTURE OF RIGHT HIP WITH ROUTINE HEALING, SUBSEQUENT ENCOUNTER: Primary | ICD-10-CM

## 2024-10-16 PROCEDURE — 1123F ACP DISCUSS/DSCN MKR DOCD: CPT | Performed by: STUDENT IN AN ORGANIZED HEALTH CARE EDUCATION/TRAINING PROGRAM

## 2024-10-16 PROCEDURE — 99213 OFFICE O/P EST LOW 20 MIN: CPT | Performed by: STUDENT IN AN ORGANIZED HEALTH CARE EDUCATION/TRAINING PROGRAM

## 2024-10-16 NOTE — PROGRESS NOTES
[facility]  [address]  [place] [address]  Dept: [phone]  Dept Fax: [phone]        Ambulatory Follow Up    Subjective:       HPI:    Allegra Meza is a 74 y.o. year old female who presents to our office today for routine follow-up regarding her right CMN.  Patient underwent this procedure on 6/23/2024 by Dr. David.  Since that time she has had recurrent right hip pain which has not been getting better.  She still resides at [facility] and states that physical therapy has still not worked with her.  When discussing why physical therapy has not worked with her, she is unsure.  She currently is hard of hearing and I difficulty locating where the pain is.  She states she has not ambulated in quite a while.  She currently utilizes a wheelchair for ambulation.  At today's visit she denies fever, chills, chest pain, shortness of breath or new numbness or tingling.       Review of Systems:  Constitutional: Negative for fever and chills.   HENT: Negative for congestion.    Eyes: Negative for blurred vision and double vision.   Respiratory: Negative for cough, shortness of breath and wheezing.    Cardiovascular: Negative for chest pain and palpitations.   Gastrointestinal: Negative for nausea. Negative for vomiting.   Musculoskeletal: Positive for (right hip pain).   Skin: Negative for itching and rash.   Neurological: Negative for dizziness, sensory change and headaches.   Psychiatric/Behavioral: Negative for depression and suicidal ideas.       Objective :   General: AAOx3, NAD, appears stated age  CV: no obvious JVD, distal pulses 2+  Respiratory: chest rise symmetric, unlabored respirations, no audible wheezing  Skin: warm, well perfused, no obvious rashes or lesions  Psych: Patient displays understanding of exam, diagnosis, and plan.    MSK:     RLE: Skin intact.  Incision sites well-healed without any erythema or

## (undated) DEVICE — GLOVE SURG SZ 7 CRM LTX FREE POLYISOPRENE POLYMER BEAD ANTI

## (undated) DEVICE — GLOVE SURG SZ 75 CRM LTX FREE POLYISOPRENE POLYMER BEAD ANTI

## (undated) DEVICE — BIT DRL L330MM DIA4.2MM CALIB 100MM 3 FLUT QUIK CPL

## (undated) DEVICE — MARKER,SKIN,WI/RULER AND LABELS: Brand: MEDLINE

## (undated) DEVICE — STAZ MAJOR BASIN: Brand: MEDLINE INDUSTRIES, INC.

## (undated) DEVICE — Z DISCONTINUED USE 2220143 SUTURE VCRL SZ 2-0 L27IN ABSRB UD L36MM CP-1 1/2 CIR REV J266H

## (undated) DEVICE — BANDAGE COMPR W6INXL5YD WHT BGE POLY COT M E WRP WV HK AND

## (undated) DEVICE — PADDING,UNDERCAST,COTTON, 4"X4YD STERILE: Brand: MEDLINE

## (undated) DEVICE — BIT DRL L300MM DIA10MM CANN TAPR L QUIK CPL FOR DH DC TFN

## (undated) DEVICE — GUIDEWIRE ORTH L400MM DIA3.2MM FOR TFN

## (undated) DEVICE — NEPTUNE E-SEP 165MM SUCTION SLEEVE: Brand: NEPTUNE E-SEP

## (undated) DEVICE — DRAPE,REIN 53X77,STERILE: Brand: MEDLINE

## (undated) DEVICE — HYPODERMIC SAFETY NEEDLE: Brand: MAGELLAN

## (undated) DEVICE — Device

## (undated) DEVICE — C-ARM: Brand: UNBRANDED

## (undated) DEVICE — DRESSING,GAUZE,XEROFORM,CURAD,1"X8",ST: Brand: CURAD

## (undated) DEVICE — GAUZE,SPONGE,FLUFF,6"X6.75",STRL,5/TRAY: Brand: MEDLINE

## (undated) DEVICE — DRESSING TRNSPAR W5XL4.5IN FLM SHT SEMIPERMEABLE WIND

## (undated) DEVICE — 6619 2 PTNT ISO SYS INCISE AREA&LT;(&GT;&&LT;)&GT;P: Brand: STERI-DRAPE™ IOBAN™ 2

## (undated) DEVICE — 4-PORT MANIFOLD: Brand: NEPTUNE 2

## (undated) DEVICE — DRESSING PETRO W3XL8IN OIL EMUL N ADH GZ KNIT IMPREG CELOS

## (undated) DEVICE — SUTURE VICRYL SZ 0 L27IN ABSRB UD L36MM CP-1 1/2 CIR REV CUT J267H